# Patient Record
Sex: MALE | Race: WHITE | NOT HISPANIC OR LATINO | Employment: OTHER | URBAN - METROPOLITAN AREA
[De-identification: names, ages, dates, MRNs, and addresses within clinical notes are randomized per-mention and may not be internally consistent; named-entity substitution may affect disease eponyms.]

---

## 2017-05-01 ENCOUNTER — HISTORICAL (OUTPATIENT)
Dept: ADMINISTRATIVE | Facility: HOSPITAL | Age: 62
End: 2017-05-01

## 2017-05-01 ENCOUNTER — HISTORICAL (OUTPATIENT)
Dept: LAB | Facility: HOSPITAL | Age: 62
End: 2017-05-01

## 2020-11-17 LAB
ABS NEUT (OLG): 8.07 X10(3)/MCL (ref 2.1–9.2)
ALBUMIN SERPL-MCNC: 4.1 GM/DL (ref 3.4–4.8)
ALBUMIN/GLOB SERPL: 1.2 RATIO (ref 1.1–2)
ALP SERPL-CCNC: 76 UNIT/L (ref 40–150)
ALT SERPL-CCNC: 27 UNIT/L (ref 0–55)
ANISOCYTOSIS BLD QL SMEAR: 1
APTT PPP: 31 SECOND(S) (ref 23.2–33.7)
AST SERPL-CCNC: 17 UNIT/L (ref 5–34)
BASOPHILS # BLD AUTO: 0.2 X10(3)/MCL (ref 0–0.2)
BASOPHILS NFR BLD AUTO: 2 %
BILIRUB SERPL-MCNC: 0.5 MG/DL
BILIRUBIN DIRECT+TOT PNL SERPL-MCNC: 0.2 MG/DL (ref 0–0.5)
BILIRUBIN DIRECT+TOT PNL SERPL-MCNC: 0.3 MG/DL (ref 0–0.8)
BUN SERPL-MCNC: 8.8 MG/DL (ref 8.4–25.7)
CALCIUM SERPL-MCNC: 8.9 MG/DL (ref 8.8–10)
CHLORIDE SERPL-SCNC: 99 MMOL/L (ref 98–107)
CO2 SERPL-SCNC: 28 MMOL/L (ref 23–31)
CREAT SERPL-MCNC: 0.83 MG/DL (ref 0.73–1.18)
EOSINOPHIL # BLD AUTO: 0.3 X10(3)/MCL (ref 0–0.9)
EOSINOPHIL NFR BLD AUTO: 3 %
ERYTHROCYTE [DISTWIDTH] IN BLOOD BY AUTOMATED COUNT: 19.9 % (ref 11.5–17)
GLOBULIN SER-MCNC: 3.4 GM/DL (ref 2.4–3.5)
GLUCOSE SERPL-MCNC: 80 MG/DL (ref 82–115)
HCT VFR BLD AUTO: 39.5 % (ref 42–52)
HGB BLD-MCNC: 11.1 GM/DL (ref 14–18)
INR PPP: 1.1 (ref 0–1.3)
LYMPHOCYTES # BLD AUTO: 1.3 X10(3)/MCL (ref 0.6–4.6)
LYMPHOCYTES NFR BLD AUTO: 12 %
MCH RBC QN AUTO: 20.1 PG (ref 27–31)
MCHC RBC AUTO-ENTMCNC: 28.1 GM/DL (ref 33–36)
MCV RBC AUTO: 71.7 FL (ref 80–94)
MICROCYTES BLD QL SMEAR: 1
MONOCYTES # BLD AUTO: 1 X10(3)/MCL (ref 0.1–1.3)
MONOCYTES NFR BLD AUTO: 9 %
NEUTROPHILS # BLD AUTO: 8.07 X10(3)/MCL (ref 2.1–9.2)
NEUTROPHILS NFR BLD AUTO: 74 %
PLATELET # BLD AUTO: 610 X10(3)/MCL (ref 130–400)
PLATELET # BLD EST: ABNORMAL 10*3/UL
PMV BLD AUTO: 9.5 FL (ref 9.4–12.4)
POTASSIUM SERPL-SCNC: 4.8 MMOL/L (ref 3.5–5.1)
PROT SERPL-MCNC: 7.5 GM/DL (ref 5.8–7.6)
PROTHROMBIN TIME: 13.6 SECOND(S) (ref 11.1–13.7)
RBC # BLD AUTO: 5.51 X10(6)/MCL (ref 4.7–6.1)
RBC MORPH BLD: ABNORMAL
SODIUM SERPL-SCNC: 134 MMOL/L (ref 136–145)
WBC # SPEC AUTO: 11 X10(3)/MCL (ref 4.5–11.5)

## 2020-11-20 ENCOUNTER — HISTORICAL (OUTPATIENT)
Dept: SURGERY | Facility: HOSPITAL | Age: 65
End: 2020-11-20

## 2020-11-20 LAB — EST CREAT CLEARANCE SER (OHS): 120.73 ML/MIN

## 2021-03-10 ENCOUNTER — HISTORICAL (OUTPATIENT)
Dept: ADMINISTRATIVE | Facility: HOSPITAL | Age: 66
End: 2021-03-10

## 2021-03-10 LAB
ABS NEUT (OLG): 5.54 X10(3)/MCL (ref 2.1–9.2)
BASOPHILS # BLD AUTO: 0.1 X10(3)/MCL (ref 0–0.2)
BASOPHILS NFR BLD AUTO: 1.3 %
EOSINOPHIL # BLD AUTO: 0.3 X10(3)/MCL (ref 0–0.9)
EOSINOPHIL NFR BLD AUTO: 3.5 %
ERYTHROCYTE [DISTWIDTH] IN BLOOD BY AUTOMATED COUNT: 26.6 % (ref 11.5–17)
HCT VFR BLD AUTO: 43.1 % (ref 42–52)
HGB BLD-MCNC: 12.9 GM/DL (ref 14–18)
LYMPHOCYTES # BLD AUTO: 1.2 X10(3)/MCL (ref 0.6–4.6)
LYMPHOCYTES NFR BLD AUTO: 14.7 %
MCH RBC QN AUTO: 22.3 PG (ref 27–31)
MCHC RBC AUTO-ENTMCNC: 29.9 GM/DL (ref 33–36)
MCV RBC AUTO: 74.6 FL (ref 80–94)
MONOCYTES # BLD AUTO: 0.8 X10(3)/MCL (ref 0.1–1.3)
MONOCYTES NFR BLD AUTO: 9.9 %
NEUTROPHILS # BLD AUTO: 5.5 X10(3)/MCL (ref 2.1–9.2)
NEUTROPHILS NFR BLD AUTO: 70 %
PLATELET # BLD AUTO: 672 X10(3)/MCL (ref 130–400)
PMV BLD AUTO: 9.3 FL (ref 9.4–12.4)
RBC # BLD AUTO: 5.78 X10(6)/MCL (ref 4.7–6.1)
RET# (OHS): 0.17 X10^6/ML (ref 0.03–0.1)
RETICULOCYTE COUNT AUTOMATED (OLG): 2.9 % (ref 1.1–2.1)
WBC # SPEC AUTO: 7.9 X10(3)/MCL (ref 4.5–11.5)

## 2021-04-01 ENCOUNTER — HISTORICAL (OUTPATIENT)
Dept: ADMINISTRATIVE | Facility: HOSPITAL | Age: 66
End: 2021-04-01

## 2021-04-01 LAB
ABS NEUT (OLG): 8.87 X10(3)/MCL (ref 2.1–9.2)
BASOPHILS # BLD AUTO: 0.1 X10(3)/MCL (ref 0–0.2)
BASOPHILS NFR BLD AUTO: 0.8 %
EOSINOPHIL # BLD AUTO: 0.2 X10(3)/MCL (ref 0–0.9)
EOSINOPHIL NFR BLD AUTO: 2 %
ERYTHROCYTE [DISTWIDTH] IN BLOOD BY AUTOMATED COUNT: ABNORMAL % (ref 11.5–17)
HCT VFR BLD AUTO: 47.7 % (ref 42–52)
HGB BLD-MCNC: 14.8 GM/DL (ref 14–18)
LYMPHOCYTES # BLD AUTO: 1.2 X10(3)/MCL (ref 0.6–4.6)
LYMPHOCYTES NFR BLD AUTO: 10.4 %
MCH RBC QN AUTO: 24.2 PG (ref 27–31)
MCHC RBC AUTO-ENTMCNC: 31 GM/DL (ref 33–36)
MCV RBC AUTO: 77.9 FL (ref 80–94)
MONOCYTES # BLD AUTO: 0.8 X10(3)/MCL (ref 0.1–1.3)
MONOCYTES NFR BLD AUTO: 6.7 %
NEUTROPHILS # BLD AUTO: 8.9 X10(3)/MCL (ref 2.1–9.2)
NEUTROPHILS NFR BLD AUTO: 79.7 %
PLATELET # BLD AUTO: 708 X10(3)/MCL (ref 130–400)
PMV BLD AUTO: 8.9 FL (ref 9.4–12.4)
RBC # BLD AUTO: 6.12 X10(6)/MCL (ref 4.7–6.1)
WBC # SPEC AUTO: 11.1 X10(3)/MCL (ref 4.5–11.5)

## 2021-04-21 ENCOUNTER — HISTORICAL (OUTPATIENT)
Dept: ADMINISTRATIVE | Facility: HOSPITAL | Age: 66
End: 2021-04-21

## 2021-04-21 LAB
ABS NEUT (OLG): 5.81 X10(3)/MCL (ref 2.1–9.2)
BASOPHILS # BLD AUTO: 0.1 X10(3)/MCL (ref 0–0.2)
BASOPHILS NFR BLD AUTO: 0.7 %
EOSINOPHIL # BLD AUTO: 0.2 X10(3)/MCL (ref 0–0.9)
EOSINOPHIL NFR BLD AUTO: 2.6 %
ERYTHROCYTE [DISTWIDTH] IN BLOOD BY AUTOMATED COUNT: 26.9 % (ref 11.5–17)
HCT VFR BLD AUTO: 48.9 % (ref 42–52)
HGB BLD-MCNC: 15.7 GM/DL (ref 14–18)
LYMPHOCYTES # BLD AUTO: 1.1 X10(3)/MCL (ref 0.6–4.6)
LYMPHOCYTES NFR BLD AUTO: 13.5 %
MCH RBC QN AUTO: 25.7 PG (ref 27–31)
MCHC RBC AUTO-ENTMCNC: 32.1 GM/DL (ref 33–36)
MCV RBC AUTO: 80.2 FL (ref 80–94)
MONOCYTES # BLD AUTO: 0.8 X10(3)/MCL (ref 0.1–1.3)
MONOCYTES NFR BLD AUTO: 10.4 %
NEUTROPHILS # BLD AUTO: 5.8 X10(3)/MCL (ref 2.1–9.2)
NEUTROPHILS NFR BLD AUTO: 72.6 %
PLATELET # BLD AUTO: 591 X10(3)/MCL (ref 130–400)
PMV BLD AUTO: 8.9 FL (ref 9.4–12.4)
RBC # BLD AUTO: 6.1 X10(6)/MCL (ref 4.7–6.1)
WBC # SPEC AUTO: 8 X10(3)/MCL (ref 4.5–11.5)

## 2021-06-03 ENCOUNTER — HISTORICAL (OUTPATIENT)
Dept: ADMINISTRATIVE | Facility: HOSPITAL | Age: 66
End: 2021-06-03

## 2021-06-03 LAB
ABS NEUT (OLG): 5.85 X10(3)/MCL (ref 2.1–9.2)
BASOPHILS # BLD AUTO: 0 X10(3)/MCL (ref 0–0.2)
BASOPHILS NFR BLD AUTO: 0.4 %
EOSINOPHIL # BLD AUTO: 0.2 X10(3)/MCL (ref 0–0.9)
EOSINOPHIL NFR BLD AUTO: 2 %
ERYTHROCYTE [DISTWIDTH] IN BLOOD BY AUTOMATED COUNT: 19.8 % (ref 11.5–17)
FERRITIN SERPL-MCNC: 32.57 NG/ML (ref 21.81–274.66)
HCT VFR BLD AUTO: 49.6 % (ref 42–52)
HGB BLD-MCNC: 16.6 GM/DL (ref 14–18)
LYMPHOCYTES # BLD AUTO: 1.1 X10(3)/MCL (ref 0.6–4.6)
LYMPHOCYTES NFR BLD AUTO: 13.5 %
MCH RBC QN AUTO: 27.9 PG (ref 27–31)
MCHC RBC AUTO-ENTMCNC: 33.5 GM/DL (ref 33–36)
MCV RBC AUTO: 83.5 FL (ref 80–94)
MONOCYTES # BLD AUTO: 0.7 X10(3)/MCL (ref 0.1–1.3)
MONOCYTES NFR BLD AUTO: 9.3 %
NEUTROPHILS # BLD AUTO: 5.8 X10(3)/MCL (ref 2.1–9.2)
NEUTROPHILS NFR BLD AUTO: 74.4 %
PLATELET # BLD AUTO: 501 X10(3)/MCL (ref 130–400)
PMV BLD AUTO: 8.6 FL (ref 9.4–12.4)
RBC # BLD AUTO: 5.94 X10(6)/MCL (ref 4.7–6.1)
WBC # SPEC AUTO: 7.9 X10(3)/MCL (ref 4.5–11.5)

## 2021-08-16 ENCOUNTER — HISTORICAL (OUTPATIENT)
Dept: ADMINISTRATIVE | Facility: HOSPITAL | Age: 66
End: 2021-08-16

## 2021-08-16 LAB
ABS NEUT (OLG): 5.09 X10(3)/MCL (ref 2.1–9.2)
BASOPHILS # BLD AUTO: 0 X10(3)/MCL (ref 0–0.2)
BASOPHILS NFR BLD AUTO: 0.3 %
EOSINOPHIL # BLD AUTO: 0.1 X10(3)/MCL (ref 0–0.9)
EOSINOPHIL NFR BLD AUTO: 2.1 %
ERYTHROCYTE [DISTWIDTH] IN BLOOD BY AUTOMATED COUNT: 18.8 % (ref 11.5–17)
HCT VFR BLD AUTO: 46.3 % (ref 42–52)
HGB BLD-MCNC: 15.1 GM/DL (ref 14–18)
LYMPHOCYTES # BLD AUTO: 1 X10(3)/MCL (ref 0.6–4.6)
LYMPHOCYTES NFR BLD AUTO: 14.3 %
MCH RBC QN AUTO: 30.6 PG (ref 27–31)
MCHC RBC AUTO-ENTMCNC: 32.6 GM/DL (ref 33–36)
MCV RBC AUTO: 93.7 FL (ref 80–94)
MONOCYTES # BLD AUTO: 0.5 X10(3)/MCL (ref 0.1–1.3)
MONOCYTES NFR BLD AUTO: 8 %
NEUTROPHILS # BLD AUTO: 5.1 X10(3)/MCL (ref 2.1–9.2)
NEUTROPHILS NFR BLD AUTO: 74.9 %
PLATELET # BLD AUTO: 464 X10(3)/MCL (ref 130–400)
PMV BLD AUTO: 9 FL (ref 9.4–12.4)
RBC # BLD AUTO: 4.94 X10(6)/MCL (ref 4.7–6.1)
WBC # SPEC AUTO: 6.8 X10(3)/MCL (ref 4.5–11.5)

## 2022-02-28 ENCOUNTER — HISTORICAL (OUTPATIENT)
Dept: ADMINISTRATIVE | Facility: HOSPITAL | Age: 67
End: 2022-02-28

## 2022-02-28 LAB
ABS NEUT (OLG): 3.93 (ref 2.1–9.2)
BASOPHILS # BLD AUTO: 0.1 10*3/UL (ref 0–0.2)
BASOPHILS NFR BLD AUTO: 1 %
EOSINOPHIL # BLD AUTO: 0.2 10*3/UL (ref 0–0.9)
EOSINOPHIL NFR BLD AUTO: 4 %
ERYTHROCYTE [DISTWIDTH] IN BLOOD BY AUTOMATED COUNT: 15.7 % (ref 11.5–17)
FERRITIN SERPL-MCNC: 14.61 NG/ML (ref 21.81–274.66)
HCT VFR BLD AUTO: 36.4 % (ref 42–52)
HGB BLD-MCNC: 11.4 G/DL (ref 14–18)
LYMPHOCYTES # BLD AUTO: 1.1 10*3/UL (ref 0.6–4.6)
LYMPHOCYTES NFR BLD AUTO: 18 %
MANUAL DIFF? (OHS): NO
MCH RBC QN AUTO: 27.5 PG (ref 27–31)
MCHC RBC AUTO-ENTMCNC: 31.3 G/DL (ref 33–36)
MCV RBC AUTO: 87.7 FL (ref 80–94)
MONOCYTES # BLD AUTO: 0.6 10*3/UL (ref 0.1–1.3)
MONOCYTES NFR BLD AUTO: 11 %
NEUTROPHILS # BLD AUTO: 3.93 10*3/UL (ref 2.1–9.2)
NEUTROPHILS NFR BLD AUTO: 66 %
PLATELET # BLD AUTO: 301 10*3/UL (ref 130–400)
PMV BLD AUTO: 10.2 FL (ref 9.4–12.4)
RBC # BLD AUTO: 4.15 10*6/UL (ref 4.7–6.1)
WBC # SPEC AUTO: 5.9 10*3/UL (ref 4.5–11.5)

## 2022-04-11 ENCOUNTER — HISTORICAL (OUTPATIENT)
Dept: INFUSION THERAPY | Facility: HOSPITAL | Age: 67
End: 2022-04-11

## 2022-04-26 DIAGNOSIS — D50.9 IRON DEFICIENCY ANEMIA, UNSPECIFIED IRON DEFICIENCY ANEMIA TYPE: Primary | ICD-10-CM

## 2022-04-28 NOTE — PROGRESS NOTES
Patient:   Richard Healy            MRN: [id number]            FIN: [id number]               Age:   65 years     Sex:  Male     :  1955   Associated Diagnoses:   None   Author:   Raj Sanders MD      Visit Information   Diagnosis: ELLIS-2+ Polycythemia vera                  Iron deficiency anemia    Current Treatment: Observation  Treatment History: Feraheme 510 mg 3/2 and 3/9/21    Clinical History:  Patient was initially referred to Dr. Christensen in [place] in  for an abnormal CBC. Platelet count was 739 and hemoglobin level was 18.6. He was started on Hydrea pending further workup. PCR for BCRABL was negative. Bone marrow 17 was hypercellular 70% with trilineage hematopoiesis and no significant dysplasia. Iron stores were absent. There was no significant fibrosis. 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 Following Dr. Christensen's longterm, care was transferred to Dr. Taylor. He has never undergone therapeutic phlebotomy. Beginning in July and 2020, he began developing mild anemia with microcytic, hypochromic red cell indices. He also had progressive thrombocytosis. Iron profile 10/29/20 showed evidence of iron deficiency anemia with a serum ferritin level of 8.9. He was treated with an oral iron supplement but had progressive anemia with associated fatigue and progressive thrombocytopenia. He also had symptoms of pica for Cherry tomatoes and cucumbers. Follow-up testing 21 showed a hemoglobin of 11.1 and hematocrit 38.2. WBC was 9.2 and platelet count had increased to 739. Serum iron was 20, TIBC 488, saturation 4% and ferritin level V.6 ng/mL. B12 and folic acid levels were normal. He was treated with IV Feraheme 510 mg x 2 doses on 3/2 and 3/9/21.    He transferred his care to [facility] 3/10/21 due to the relocation of Dr. Taylor. He felt much better following the IV iron infusions. His pica symptoms had decreased  significantly in his energy level was improved. CBC showed improvement in his anemia and microcytic red cell indices. His thrombocytosis was also improved. He had been off of Hydrea since 1/21. He was also taking oral iron which was discontinued. He is a long-term smoker but had no history of thromboembolism.      Chief Complaint   Energy level improved      Interval History   He returns to clinic today for a three-week follow-up visit accompanied by his wife. His hemoglobin and hematocrit have improved with treatment of his iron deficiency. WBC and platelet counts are slightly increased from previous testing as well. He remains off of Hydrea. He is not having any significant disease related symptoms. He continues to smoke 1 pack a day. He has been vaccinated for COVID-19.       Review of Systems   Constitutional:  Weight stable, No fever, No sweats, No weakness, No fatigue.    Eye:  No icterus, No blurring, No double vision.    Ear/Nose/Mouth/Throat:  No nasal congestion, No sore throat.    Respiratory:  No shortness of breath, No cough, No sputum production, No wheezing.    Cardiovascular:  No chest pain, No palpitations, No tachycardia.    Gastrointestinal:  Constipation, No nausea, No vomiting, No diarrhea, No abdominal pain.    Genitourinary:  No dysuria, No hematuria, No change in urine stream.    Hematology/Lymphatics:  No bruising tendency, No bleeding tendency, No swollen lymph glands.    Musculoskeletal:  Joint pain, No lower extremity edema, No back pain.    Integumentary:  No rash, No pruritus, No skin lesion.    Neurologic:  Alert and oriented X4, No abnormal balance, No confusion, No numbness, No tingling.    Psychiatric:  No anxiety, No depression.       Health Status   Current medications:  (Selected)   Documented Medications  Documented  amlodipine 10 mg oral tablet: 10 mg = 1 tab(s), Oral, Daily, # 30 tab(s), 0 Refill(s)  aspirin 81 mg oral Delayed Release (EC) tablet: 81 mg = 1 tab(s), Oral,  Daily, # 30 tab(s), 0 Refill(s)  atorvastatin 40 mg oral tablet: 40 mg = 1 tab(s), Oral, Daily, # 30 tab(s), 0 Refill(s)  hydrochlorothiazide 25 mg oral tablet: 25 mg = 1 tab(s), Oral, Daily, # 30 tab(s), 0 Refill(s)  metoprolol succinate 25 mg oral tablet extended release: 25 mg = 1 tab(s), Oral, Daily, # 30 tab(s), 0 Refill(s)  multivitamin with minerals (Adult Tab): 1 tab(s), Oral, Daily, # 30 tab(s), 0 Refill(s)  trandolapril 4 mg oral tablet: 8 mg = 2 tab(s), Oral, Daily, # 30 tab(s), 0 Refill(s)      Histories     PMHx:  HTN, hyperlipidemia, COPD    PSHx:  Tonsils, appendectomy, hemorrhoids, R foot lipoma, bone marrow    SH:  + Tobacco 1.5 PPD > 40 years. + Social alcohol use. 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    FH:  Maternal grandfather had cancer of unknown type. No family history of blood disorders.      Physical Examination   Vital Signs   4/1/2021 10:35 CDT       Temperature Oral          36.1 DegC                             Temperature Oral (calculated)             96.98 DegF                             Peripheral Pulse Rate     78 bpm                             Systolic Blood Pressure   133 mmHg                             Diastolic Blood Pressure  87 mmHg                             Blood Pressure Location   Right arm                             Manual Cuff BP            No     General:  Alert and oriented, Obese white male in no acute distress.    Eye:  Pupils are equal, round and reactive to light, Extraocular movements are intact, Normal conjunctiva.    HENT:  Normocephalic, Oral mucosa is moist, No pharyngeal erythema.    Neck:  Supple, Non-tender, No lymphadenopathy.    Respiratory:  Decreased breath sounds at the bases, otherwise clear.    Cardiovascular:  Normal rate, Regular rhythm, No murmur.    Gastrointestinal:  Soft, Non-tender, Non-distended, Normal bowel sounds, Obese. No palpable masses or organomegaly.    Lymphatics:  No lymphadenopathy neck, axilla,  groin.    Musculoskeletal:  Normal range of motion, No tenderness, No lower extremity edema.    Integumentary:  Warm, Dry, No rash.    Neurologic:  Alert, Oriented, Normal sensory, Normal motor function, No focal deficits, Cranial Nerves II-XII are grossly intact.       Review / Management   Laboratory Results   Today's Lab Results : PowerNote Discrete Results   4/1/2021 10:22 CDT       WBC                       11.1 x10(3)/mcL                             RBC                       6.12 x10(6)/mcL  HI                             Hgb                       14.8 gm/dL                             Hct                       47.7 %                             Platelet                  708 x10(3)/mcL  HI                             MCV                       77.9 fL  LOW                             MCH                       24.2 pg  LOW                             MCHC                      31.0 gm/dL  LOW                             RDW                       not measured %                             MPV                       8.9 fL  LOW                             Abs Neut                  8.87 x10(3)/mcL                             NEUT%                     79.7 %  NA                             NEUT#                     8.9 x10(3)/mcL                             LYMPH%                    10.4 %  NA                             LYMPH#                    1.2 x10(3)/mcL                             MONO%                     6.7 %  NA                             MONO#                     0.8 x10(3)/mcL                             EOS%                      2.0 %  NA                             EOS#                      0.2 x10(3)/mcL                             BASO%                     0.8 %  NA                             BASO#                     0.1 x10(3)/mcL           Impression and Plan   IMPRESSION:  ELLIS-2+ Polycythemia vera  Iron deficiency anemia    PLAN:  H&H increasing following IV iron replacement.  Restart Hydrea  1000 mg alternating with 500 mg every other day.  Continue aspirin 81 mg daily.  RTC in 3 weeks for a follow-up visit and repeat CBC.      ISRA SANDERS M.D.    Other Physicians  Dr. Nick Suazo      Professional Services   I, Shasta Norman LPN, acted solely as a scribe for and in the presence of, Dr. Isra Sanders, who performed the service.

## 2022-04-28 NOTE — PROGRESS NOTES
Patient:   Richard Healy            MRN: [id number]            FIN: [id number]               Age:   65 years     Sex:  Male     :  1955   Associated Diagnoses:   None   Author:   Bhavik GREEN, Bhakti AYERS      Visit Information   Diagnosis: ELLIS-2+ Polycythemia vera                  Iron deficiency anemia - resolved      + COVID-19 vaccinated    Current Treatment: Hydrea 1000 mg daily; ASA 81 mg daily  Treatment History: Feraheme 510 mg 3/2 and 3/9/21    Clinical History:  Patient was initially referred to Dr. Christensen in [place] in  for an abnormal CBC. Platelet count was 739 and hemoglobin level was 18.6. He was started on Hydrea pending further workup. PCR for BCRABL was negative. Bone marrow 17 was hypercellular 70% with trilineage hematopoiesis and no significant dysplasia. Iron stores were absent. There was no significant fibrosis. 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 Following Dr. Christensen's shelter, care was transferred to Dr. Taylor. He has never undergone therapeutic phlebotomy. Beginning in July and 2020, he began developing mild anemia with microcytic, hypochromic red cell indices. He also had progressive thrombocytosis. Iron profile 10/29/20 showed evidence of iron deficiency anemia with a serum ferritin level of 8.9. He was treated with an oral iron supplement but had progressive anemia with associated fatigue and progressive thrombocytopenia. He also had symptoms of pica for Cherry tomatoes and cucumbers. Follow-up testing 21 showed a hemoglobin of 11.1 and hematocrit 38.2. WBC was 9.2 and platelet count had increased to 739. Serum iron was 20, TIBC 488, saturation 4% and ferritin level V.6 ng/mL. B12 and folic acid levels were normal. He was treated with IV Feraheme 510 mg x 2 doses on 3/2 and 3/9/21.    He transferred his care to [facility] 3/10/21 due to the relocation of Dr. Taylor. He felt much better  "following the IV iron infusions. His pica symptoms had decreased significantly in his energy level was improved. CBC showed improvement in his anemia and microcytic red cell indices. His thrombocytosis was also improved. He had been off of Hydrea since 1/21. He was also taking oral iron which was discontinued. He is a long-term smoker but had no history of thromboembolism. Hydrea was resumed 4/1/21 at dose of 500 mg alternating with 1000 mg daily.      Chief Complaint   "Fatigue"      Interval History   Mr. Healy is here today by himself for an 8 week followup visit.  He feels well and denies any recent illnesses.  He has been vaccinated against COVID-19.  He is taking his Hydrea daily as directed.  His current dose is 1000 mg daily.  He has no appreciable treatment related side effects.  CBC in the office today is at goal with a hematocrit of  46.3% and platelet count of 461,000.  These findings were reviewed today with the patient.  His only complaint is that of fatigue.  No fever, night sweats, abdominal pain, or early satiety.  His appetite is good and his weight is stable.        Review of Systems   Constitutional:  Fatigue, Weight stable, No fever, No sweats, No weakness.    Eye:  No icterus, No blurring, No double vision.    Ear/Nose/Mouth/Throat:  No nasal congestion, No sore throat.    Respiratory:  Chronic cough and shortness of breath (stable), No sputum production, No wheezing.    Cardiovascular:  No chest pain, No palpitations, No tachycardia.    Gastrointestinal:  Constipation, No nausea, No vomiting, No diarrhea, No abdominal pain.    Genitourinary:  No dysuria, No hematuria, No change in urine stream.    Hematology/Lymphatics:  No bruising tendency, No bleeding tendency, No swollen lymph glands.    Musculoskeletal:  Joint pain, No lower extremity edema, No back pain.    Integumentary:  No rash, No pruritus, No skin lesion.    Neurologic:  Alert and oriented X4, No abnormal balance, No confusion, No " numbness, No tingling.    Psychiatric:  No anxiety, No depression.       Health Status   Allergies:    Allergic Reactions (Selected)  No Known Medication Allergies,    Allergies (1) Active Reaction  No Known Medication Allergies None Documented     Current medications:  (Selected)   Inpatient Medications  Ordered  midazolam 1 mg/mL injectable solution: 1 mg, form: Injection, IV Push, Once, first dose 05/01/17 10:00:00 CDT, stop date 05/01/17 10:00:00 CDT, 24  Pending Complete  midazolam: 2 mg, form: Injection, IV Push, q5min, Order duration: 2 dose(s), first dose 11/20/20 5:20:00 CST, stop date 11/20/20 5:29:00 CST, (up to 5 mg for moderate anxiety)  Prescriptions  Prescribed  Hydrea 500 mg oral capsule: 1,000 mg = 2 cap(s), Oral, Daily, # 60 cap(s), 5 Refill(s), Pharmacy: [facility], 162, cm, Height/Length Dosing, 06/03/21 9:50:00 CDT, 94.1, kg, Weight Dosing, 06/03/21 9:50:00 CDT  Documented Medications  Documented  albuterol CFC free 90 mcg/inh inhalation aerosol with adapter: 2 puff(s), Oral, q4-6hr  amlodipine 10 mg oral tablet: 10 mg = 1 tab(s), Oral, Daily, # 30 tab(s), 0 Refill(s)  aspirin 81 mg oral Delayed Release (EC) tablet: 81 mg = 1 tab(s), Oral, Daily, # 30 tab(s), 0 Refill(s)  atorvastatin 40 mg oral tablet: 40 mg = 1 tab(s), Oral, Daily, # 30 tab(s), 0 Refill(s)  hydrochlorothiazide 25 mg oral tablet: 25 mg = 1 tab(s), Oral, Daily, # 30 tab(s), 0 Refill(s)  metoprolol succinate 25 mg oral tablet extended release: 25 mg = 1 tab(s), Oral, Daily, # 30 tab(s), 0 Refill(s)  multivitamin with minerals (Adult Tab): 1 tab(s), Oral, Daily, # 30 tab(s), 0 Refill(s)  trandolapril 4 mg oral tablet: 8 mg = 2 tab(s), Oral, Daily, # 30 tab(s), 0 Refill(s)      Histories     PMHx:  HTN, hyperlipidemia, COPD    PSHx:  Tonsils, appendectomy, hemorrhoids, R foot lipoma, bone marrow    SH:  + Tobacco 1.5 PPD > 40 years. + Social alcohol use. 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    FH:  Maternal grandfather had cancer of unknown type. No family history of blood disorders.      Physical Examination   Vital Signs   8/16/2021 10:35 CDT      Temperature Oral          36.2 DegC                             Temperature Oral (calculated)             97.16 DegF                             Peripheral Pulse Rate     86 bpm                             SpO2                      98 %                             Systolic Blood Pressure   141 mmHg  HI                             Diastolic Blood Pressure  86 mmHg                             Blood Pressure Location   Right arm                             Manual Cuff BP            No     Measurements from flowsheet : Measurements   8/16/2021 10:35 CDT      Weight Dosing             94.000 kg                             Weight Measured           94.0 kg                             Weight Measured and Calculated in Lbs     207.23 lb                             Height/Length Dosing      162.00 cm                             Height/Length Measured    162 cm                             BSA Measured              2.06 m2                             Body Mass Index Measured  35.82 kg/m2     General:  Alert and oriented, Obese white male in no acute distress.    Eye:  Pupils are equal, round and reactive to light, Extraocular movements are intact, Normal conjunctiva.    HENT:  Normocephalic, Oral mucosa is moist, No pharyngeal erythema.    Neck:  Supple, Non-tender, No lymphadenopathy.    Respiratory:  Decreased breath sounds at the bases, otherwise clear.    Cardiovascular:  Normal rate, Regular rhythm, No murmur.    Gastrointestinal:  Soft, Non-tender, Non-distended, Normal bowel sounds, Obese. No palpable masses or organomegaly.    Lymphatics:  No lymphadenopathy neck, axilla, groin.    Musculoskeletal:  Normal range of motion, No tenderness, No lower extremity edema.    Integumentary:  Warm, Dry, No rash.    Neurologic:  Alert, Oriented, Normal  sensory, Normal motor function, No focal deficits, Cranial Nerves II-XII are grossly intact.       Review / Management   Laboratory Results   Last 10 Days Lab Results : PowerNote Discrete Results   8/16/2021 10:24 CDT      WBC                       6.8 x10(3)/mcL                             RBC                       4.94 x10(6)/mcL                             Hgb                       15.1 gm/dL                             Hct                       46.3 %                             Platelet                  464 x10(3)/mcL  HI                             MCV                       93.7 fL                             MCH                       30.6 pg                             MCHC                      32.6 gm/dL  LOW                             RDW                       18.8 %  HI                             MPV                       9.0 fL  LOW                             Abs Neut                  5.09 x10(3)/mcL                             NEUT%                     74.9 %  NA                             NEUT#                     5.1 x10(3)/mcL                             LYMPH%                    14.3 %  NA                             LYMPH#                    1.0 x10(3)/mcL                             MONO%                     8.0 %  NA                             MONO#                     0.5 x10(3)/mcL                             EOS%                      2.1 %  NA                             EOS#                      0.1 x10(3)/mcL                             BASO%                     0.3 %  NA                             BASO#                     0.0 x10(3)/mcL           Impression and Plan   IMPRESSION:  ELLIS-2+ Polycythemia vera  Iron deficiency anemia - resolved    PLAN:  CBC is at goal.  Patient will continue Hydrea 1000 mg daily.  He will return in 12 weeks for followup with lab.  Smoking cessation encouraged.  All questions answered.    GORDON DELGADO, FNP-C    Other Physicians  Dr. Nick Suazo

## 2022-04-28 NOTE — PROGRESS NOTES
Patient:   Richard Healy            MRN: [id number]            FIN: [id number]               Age:   65 years     Sex:  Male     :  1955   Associated Diagnoses:   None   Author:   Raj Sanders MD      Visit Information   Diagnosis: ELLIS-2+ Polycythemia vera                  Iron deficiency anemia    Current Treatment: New patient visit (Transfer of care)  Treatment History: Feraheme 510 mg 3/2 and 3/9/21    Clinical History:  Patient was initially referred to Dr. Christensen in [place] in  for an abnormal CBC. Platelet count was 739 and hemoglobin level was 18.6. He was started on Hydrea pending further workup. PCR for BCRABL was negative. Bone marrow 17 was hypercellular 70% with trilineage hematopoiesis and no significant dysplasia. Iron stores were absent. There was no significant fibrosis. 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 Following Dr. Christensen's [facility], care was transferred to Dr. Taylor. He has never undergone therapeutic phlebotomy. Beginning in July and 2020, he began developing mild anemia with microcytic, hypochromic red cell indices. He also had progressive thrombocytosis. Iron profile 10/29/20 showed evidence of iron deficiency anemia with a serum ferritin level of 8.9. He was treated with an oral iron supplement but had progressive anemia with associated fatigue and progressive thrombocytopenia. He also had symptoms of pica for Cherry tomatoes and cucumbers. Follow-up testing 21 showed a hemoglobin of 11.1 and hematocrit 38.2. WBC was 9.2 and platelet count had increased to 739. Serum iron was 20, TIBC 488, saturation 4% and ferritin level V.6 ng/mL. B12 and folic acid levels were normal. He was treated with IV Feraheme 510 mg x 2 doses on 3/2 and 3/9/21.    He presents today to establish ongoing Hematology care due to the relocation of Dr. Taylor. He is accompanied by his wife. He feels much better following the IV iron infusions. His pica  symptoms have decreased in his energy level is improved. CBC today shows improvement in his anemia and microcytosis. His thrombocytosis is also slightly improved. He is not taking Hydrea currently. He is still taking oral iron once a day. He denies any chest pain or shortness of breath. He is a long-term smoker. He has no history of thromboembolism. He denies abnormal bruising or bleeding. No headaches, vision changes, pruritus or erythromelalgia.      Chief Complaint   Weakness and fatigue      Review of Systems   Constitutional:  Weakness, Fatigue, No significant weight loss, No fever, No sweats.    Eye:  No icterus, No blurring, No double vision.    Ear/Nose/Mouth/Throat:  No nasal congestion, No sore throat.    Respiratory:  No shortness of breath, No cough, No sputum production, No wheezing.    Cardiovascular:  No chest pain, No palpitations, No tachycardia.    Gastrointestinal:  No nausea, No vomiting, No diarrhea, No constipation, No abdominal pain.    Genitourinary:  No dysuria, No hematuria, No change in urine stream.    Hematology/Lymphatics:  No bruising tendency, No bleeding tendency, No swollen lymph glands.    Musculoskeletal:  Joint pain, No lower extremity edema, No back pain.    Integumentary:  No rash, No pruritus, No skin lesion.    Neurologic:  Alert and oriented X4, No abnormal balance, No confusion, No numbness, No tingling.    Psychiatric:  No anxiety, No depression.       Health Status   Current medications:  (Selected)   Documented Medications  Documented  Pepcid 20 mg oral tablet: 20 mg = 1 tab(s), Oral, Daily, # 30 tab(s), 0 Refill(s)  amlodipine 10 mg oral tablet: 10 mg = 1 tab(s), Oral, Daily, # 30 tab(s), 0 Refill(s)  aspirin 81 mg oral Delayed Release (EC) tablet: 81 mg = 1 tab(s), Oral, Daily, # 30 tab(s), 0 Refill(s)  atorvastatin 40 mg oral tablet: 40 mg = 1 tab(s), Oral, Daily, # 30 tab(s), 0 Refill(s)  hydrochlorothiazide 25 mg oral tablet: 25 mg = 1 tab(s), Oral, Daily, # 30  tab(s), 0 Refill(s)  metoprolol succinate 25 mg oral tablet extended release: 25 mg = 1 tab(s), Oral, Daily, # 30 tab(s), 0 Refill(s)  multivitamin with minerals (Adult Tab): 1 tab(s), Oral, Daily, # 30 tab(s), 0 Refill(s)  trandolapril 4 mg oral tablet: 8 mg = 2 tab(s), Oral, Daily, # 30 tab(s), 0 Refill(s)      Histories     PMHx:  HTN, hyperlipidemia, COPD    PSHx:  Tonsils, appendectomy, hemorrhoids, R foot lipoma, bone marrow    SH:  + Tobacco 1.5 PPD > 40 years. + Social alcohol use. 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    FH:  Maternal grandfather had cancer of unknown type. No family history of blood disorders.      Physical Examination   Vital Signs   3/10/2021 12:54 CST      Temperature Oral          36.8 DegC                             Temperature Oral (calculated)             98.24 DegF                             Peripheral Pulse Rate     85 bpm                             Systolic Blood Pressure   134 mmHg                             Diastolic Blood Pressure  82 mmHg                             Blood Pressure Location   Right arm                             Manual Cuff BP            No     General:  Alert and oriented, Obese white male in no acute distress.    Eye:  Pupils are equal, round and reactive to light, Extraocular movements are intact, Normal conjunctiva, Sclera nonicteric.    HENT:  Normocephalic, Oral mucosa is moist, No pharyngeal erythema.    Neck:  Supple, Non-tender, No lymphadenopathy.    Respiratory:  Lungs are clear to auscultation, Respirations are non-labored, Breath sounds are equal.    Cardiovascular:  Normal rate, Regular rhythm, No murmur.    Gastrointestinal:  Soft, Non-tender, Non-distended, Normal bowel sounds, Obese. No appreciable masses or splenomegaly.    Lymphatics:  No lymphadenopathy neck, axilla, groin.    Musculoskeletal:  Normal range of motion, Normal strength, No lower extremity edema.    Integumentary:  Warm, Dry, No rash.     Neurologic:  Alert, Oriented, Normal sensory, Normal motor function, No focal deficits, Cranial Nerves II-XII are grossly intact.       Review / Management   Laboratory Results   Today's Lab Results : [employer] Discrete Results   3/10/2021 13:22 CST      WBC                       7.9 x10(3)/mcL                             RBC                       5.78 x10(6)/mcL                             Hgb                       12.9 gm/dL  LOW                             Hct                       43.1 %                             Platelet                  672 x10(3)/mcL  HI                             MCV                       74.6 fL  LOW                             MCH                       22.3 pg  LOW                             MCHC                      29.9 gm/dL  LOW                             RDW                       26.6 %  HI                             MPV                       9.3 fL  LOW                             Abs Neut                  5.54 x10(3)/mcL                             NEUT%                     70.0 %  NA                             NEUT#                     5.5 x10(3)/mcL                             LYMPH%                    14.7 %  NA                             LYMPH#                    1.2 x10(3)/mcL                             MONO%                     9.9 %  NA                             MONO#                     0.8 x10(3)/mcL                             EOS%                      3.5 %  NA                             EOS#                      0.3 x10(3)/mcL                             BASO%                     1.3 %  NA                             BASO#                     0.1 x10(3)/mcL           Impression and Plan   IMPRESSION:  ELLIS-2+ Polycythemia vera  Iron deficiency anemia  Thrombocytosis    PLAN:  Anemia improved s/p 2 doses of IV Feraheme.  His thrombocytosis was also likely due in part to the severe iron deficiency.  Need to continue to monitor his counts following IV iron  replacement to ensure he does not have significant posttreatment erythrocytosis.  He was instructed to discontinue the oral iron supplement and continue to hold Hydrea for now.  RTC in 3 weeks for a follow-up visit and clinical exam with repeat laboratory.      ISRA SANDERS M.D.    Other Physicians  Dr. Nick Suazo      Professional Services   I, Shasta Norman LPN, acted solely as a scribe for and in the presence of, Dr. Isra Sanders, who performed the service.

## 2022-04-28 NOTE — PROGRESS NOTES
Patient:   Richard Healy            MRN: [id number]            FIN: [id number]               Age:   65 years     Sex:  Male     :  1955   Associated Diagnoses:   None   Author:   Bhavik GREEN, Bhakti AYERS      Visit Information   Diagnosis: ELLIS-2+ Polycythemia vera                  Iron deficiency anemia      + COVID-19 vaccinated    Current Treatment: Hydrea 500 mg alternating with 1000 mg daily resumed 21  Treatment History: Feraheme 510 mg 3/2 and 3/9/21    Clinical History:  Patient was initially referred to Dr. Christensen in [place] in  for an abnormal CBC. Platelet count was 739 and hemoglobin level was 18.6. He was started on Hydrea pending further workup. PCR for BCRABL was negative. Bone marrow 17 was hypercellular 70% with trilineage hematopoiesis and no significant dysplasia. Iron stores were absent. There was no significant fibrosis. 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 Following Dr. Christensen's intermediate, care was transferred to Dr. Taylor. He has never undergone therapeutic phlebotomy. Beginning in July and 2020, he began developing mild anemia with microcytic, hypochromic red cell indices. He also had progressive thrombocytosis. Iron profile 10/29/20 showed evidence of iron deficiency anemia with a serum ferritin level of 8.9. He was treated with an oral iron supplement but had progressive anemia with associated fatigue and progressive thrombocytopenia. He also had symptoms of pica for Cherry tomatoes and cucumbers. Follow-up testing 21 showed a hemoglobin of 11.1 and hematocrit 38.2. WBC was 9.2 and platelet count had increased to 739. Serum iron was 20, TIBC 488, saturation 4% and ferritin level V.6 ng/mL. B12 and folic acid levels were normal. He was treated with IV Feraheme 510 mg x 2 doses on 3/2 and 3/9/21.    He transferred his care to [facility] 3/10/21 due to the relocation of Dr. Taylor. He felt  "much better following the IV iron infusions. His pica symptoms had decreased significantly in his energy level was improved. CBC showed improvement in his anemia and microcytic red cell indices. His thrombocytosis was also improved. He had been off of Hydrea since 1/21. He was also taking oral iron which was discontinued. He is a long-term smoker but had no history of thromboembolism.    Hydrea was resumed 4/1/21 at dose of 500 mg alternating with 1000 mg daily.      Chief Complaint   "I am not having any problems with the medication"      Interval History   Mr. Healy returns today by himself for a three week followup visit.  He resumed treatment with Hydrea  4/1/21 at a dose of 500 mg alternating with 1000 mg daily.  He does not appreciate any treatment related side effects.  He does report ongoing fatigue, although improved since receiving the Feraheme.  He also has intermittent shortness of breath due to his underlying COPD.  He continues to smoke and does not intend to quit at this time.  He takes prophylactic ASA daily.  CBC in the office today shows improvement in his platelet count to 591,000.  He denies any associated sweats, pruritus, or erythromelalgia.  These results were reviewed and discussed today with the patient.      Review of Systems   Constitutional:  Fatigue, Weight stable, No fever, No sweats, No weakness.    Eye:  No icterus, No blurring, No double vision.    Ear/Nose/Mouth/Throat:  No nasal congestion, No sore throat.    Respiratory:  Chronic cough and shortness of breath s/t underlying COPD, No sputum production, No wheezing.    Cardiovascular:  No chest pain, No palpitations, No tachycardia.    Gastrointestinal:  Constipation, No nausea, No vomiting, No diarrhea, No abdominal pain.    Genitourinary:  No dysuria, No hematuria, No change in urine stream.    Hematology/Lymphatics:  No bruising tendency, No bleeding tendency, No swollen lymph glands.    Musculoskeletal:  Joint pain, No lower " extremity edema, No back pain.    Integumentary:  No rash, No pruritus, No skin lesion.    Neurologic:  Alert and oriented X4, No abnormal balance, No confusion, No numbness, No tingling.    Psychiatric:  No anxiety, No depression.       Health Status   Allergies:    Allergic Reactions (Selected)  No Known Medication Allergies,    Allergies (1) Active Reaction  No Known Medication Allergies None Documented     Current medications:  (Selected)   Outpatient Medications  Ordered  midazolam 1 mg/mL injectable solution: 1 mg, form: Injection, IV Push, Once, first dose 05/01/17 10:00:00 CDT, stop date 05/01/17 10:00:00 CDT, 24  Pending Complete  midazolam: 2 mg, form: Injection, IV Push, q5min, Order duration: 2 dose(s), first dose 11/20/20 5:20:00 CST, stop date 11/20/20 5:29:00 CST, (up to 5 mg for moderate anxiety)  Prescriptions  Prescribed  Hydrea 500 mg oral capsule: See Instructions, 2 capsules alternating with 1 capsule every other day, # 60 cap(s), 5 Refill(s), Pharmacy: [facility], 162, cm, Height/Length Dosing, 04/01/21 10:37:00 CDT, 97.1, kg, Weight Dosing, 04/01/21 10:37:00 CDT  Documented Medications  Documented  amlodipine 10 mg oral tablet: 10 mg = 1 tab(s), Oral, Daily, # 30 tab(s), 0 Refill(s)  aspirin 81 mg oral Delayed Release (EC) tablet: 81 mg = 1 tab(s), Oral, Daily, # 30 tab(s), 0 Refill(s)  atorvastatin 40 mg oral tablet: 40 mg = 1 tab(s), Oral, Daily, # 30 tab(s), 0 Refill(s)  hydrochlorothiazide 25 mg oral tablet: 25 mg = 1 tab(s), Oral, Daily, # 30 tab(s), 0 Refill(s)  metoprolol succinate 25 mg oral tablet extended release: 25 mg = 1 tab(s), Oral, Daily, # 30 tab(s), 0 Refill(s)  multivitamin with minerals (Adult Tab): 1 tab(s), Oral, Daily, # 30 tab(s), 0 Refill(s)  trandolapril 4 mg oral tablet: 8 mg = 2 tab(s), Oral, Daily, # 30 tab(s), 0 Refill(s)      Histories     PMHx:  HTN, hyperlipidemia, COPD    PSHx:  Tonsils, appendectomy, hemorrhoids, R foot lipoma, bone marrow    SH:   + Tobacco 1.5 PPD > 40 years. + Social alcohol use. 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    FH:  Maternal grandfather had cancer of unknown type. No family history of blood disorders.      Physical Examination   Vital Signs   4/21/2021 9:51 CDT       Temperature Oral          36.1 DegC                             Temperature Oral (calculated)             96.98 DegF                             Peripheral Pulse Rate     71 bpm                             Systolic Blood Pressure   116 mmHg                             Diastolic Blood Pressure  78 mmHg                             Blood Pressure Location   Right arm                             Manual Cuff BP            No     Measurements from flowsheet : Measurements   4/21/2021 9:51 CDT       Weight Dosing             95.700 kg                             Weight Measured           95.7 kg                             Weight Measured and Calculated in Lbs     210.98 lb                             Height/Length Dosing      162.00 cm                             Height/Length Measured    162 cm                             BSA Measured              2.08 m2                             Body Mass Index Measured  36.47 kg/m2     General:  Alert and oriented, Obese white male in no acute distress.    Eye:  Pupils are equal, round and reactive to light, Extraocular movements are intact, Normal conjunctiva.    HENT:  Normocephalic, Oral mucosa is moist, No pharyngeal erythema.    Neck:  Supple, Non-tender, No lymphadenopathy.    Respiratory:  Decreased breath sounds at the bases, otherwise clear.    Cardiovascular:  Normal rate, Regular rhythm, No murmur.    Gastrointestinal:  Soft, Non-tender, Non-distended, Normal bowel sounds, Obese. No palpable masses or organomegaly.    Lymphatics:  No lymphadenopathy neck, axilla, groin.    Musculoskeletal:  Normal range of motion, No tenderness, No lower extremity edema.    Integumentary:  Warm, Dry, No rash.     Neurologic:  Alert, Oriented, Normal sensory, Normal motor function, No focal deficits, Cranial Nerves II-XII are grossly intact.       Review / Management   Results review:  Lab results   4/21/2021 9:37 CDT       WBC                       8.0 x10(3)/mcL                             RBC                       6.10 x10(6)/mcL                             Hgb                       15.7 gm/dL                             Hct                       48.9 %                             Platelet                  591 x10(3)/mcL  HI                             MCV                       80.2 fL                             MCH                       25.7 pg  LOW                             MCHC                      32.1 gm/dL  LOW                             RDW                       26.9 %  HI                             MPV                       8.9 fL  LOW                             Abs Neut                  5.81 x10(3)/mcL                             NEUT%                     72.6 %  NA                             NEUT#                     5.8 x10(3)/mcL                             LYMPH%                    13.5 %  NA                             LYMPH#                    1.1 x10(3)/mcL                             MONO%                     10.4 %  NA                             MONO#                     0.8 x10(3)/mcL                             EOS%                      2.6 %  NA                             EOS#                      0.2 x10(3)/mcL                             BASO%                     0.7 %  NA                             BASO#                     0.1 x10(3)/mcL  .       Impression and Plan   IMPRESSION:  ELLIS-2+ Polycythemia vera  Iron deficiency anemia    PLAN:  Patient has no evidence of recurrent anemia following IV iron replacement.  Platelet count at goal now that Hydrea has been resumed.  Continue Hydrea at current dose and schedule.  Continue Aspirin.  RTC 6 weeks for followup with lab.  Smoking  cessation encouraged.  All questions answered.    GORDON DELGADO, FNP-C    Other Physicians  Dr. Nick Suazo

## 2022-04-28 NOTE — PROGRESS NOTES
Patient:   Richard Healy            MRN: [id number]            FIN: [id number]               Age:   65 years     Sex:  Male     :  1955   Associated Diagnoses:   None   Author:   Raj Sanders MD      Visit Information   Diagnosis: ELLIS-2+ Polycythemia vera                  Iron deficiency anemia - resolved      + COVID-19 vaccinated    Current Treatment: Hydrea 500 mg alternating with 1000 mg daily resumed 21; ASA 81 mg daily  Treatment History: Feraheme 510 mg 3/2 and 3/9/21    Clinical History:  Patient was initially referred to Dr. Christensen in [place] in  for an abnormal CBC. Platelet count was 739 and hemoglobin level was 18.6. He was started on Hydrea pending further workup. PCR for BCRABL was negative. Bone marrow 17 was hypercellular 70% with trilineage hematopoiesis and no significant dysplasia. Iron stores were absent. There was no significant fibrosis. 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 Following Dr. Christensen's longterm, care was transferred to Dr. Taylor. He has never undergone therapeutic phlebotomy. Beginning in July and 2020, he began developing mild anemia with microcytic, hypochromic red cell indices. He also had progressive thrombocytosis. Iron profile 10/29/20 showed evidence of iron deficiency anemia with a serum ferritin level of 8.9. He was treated with an oral iron supplement but had progressive anemia with associated fatigue and progressive thrombocytopenia. He also had symptoms of pica for Cherry tomatoes and cucumbers. Follow-up testing 21 showed a hemoglobin of 11.1 and hematocrit 38.2. WBC was 9.2 and platelet count had increased to 739. Serum iron was 20, TIBC 488, saturation 4% and ferritin level V.6 ng/mL. B12 and folic acid levels were normal. He was treated with IV Feraheme 510 mg x 2 doses on 3/2 and 3/9/21.    He transferred his care to [facility] 3/10/21 due to the relocation  of Dr. Taylor. He felt much better following the IV iron infusions. His pica symptoms had decreased significantly in his energy level was improved. CBC showed improvement in his anemia and microcytic red cell indices. His thrombocytosis was also improved. He had been off of Hydrea since 1/21. He was also taking oral iron which was discontinued. He is a long-term smoker but had no history of thromboembolism. Hydrea was resumed 4/1/21 at dose of 500 mg alternating with 1000 mg daily.      Chief Complaint   I feel pretty good      Interval History   He returns to clinic today by himself for a 6 week follow-up visit. His iron deficiency anemia resolved following IV iron replacement. His other counts have improved once Hydrea was resumed. He has not required additional phlebotomy. He reports significant improvement in his energy level and performance status. No fever, chills, night sweats or weight loss. He reports no recent illnesses or infections. He is scheduled for an upcoming treadmill stress test with his Cardiologist. He continues to smoke. He has been vaccinated for COVID-19.      Review of Systems   Constitutional:  Weight stable, No fever, No sweats, No weakness, No fatigue.    Eye:  No icterus, No blurring, No double vision.    Ear/Nose/Mouth/Throat:  No nasal congestion, No sore throat.    Respiratory:  Chronic cough and shortness of breath (stable), No sputum production, No wheezing.    Cardiovascular:  No chest pain, No palpitations, No tachycardia.    Gastrointestinal:  Constipation, No nausea, No vomiting, No diarrhea, No abdominal pain.    Genitourinary:  No dysuria, No hematuria, No change in urine stream.    Hematology/Lymphatics:  No bruising tendency, No bleeding tendency, No swollen lymph glands.    Musculoskeletal:  Joint pain, No lower extremity edema, No back pain.    Integumentary:  No rash, No pruritus, No skin lesion.    Neurologic:  Alert and oriented X4, No abnormal balance, No confusion, No  numbness, No tingling.    Psychiatric:  No anxiety, No depression.       Health Status   Current medications:  (Selected)   Prescriptions  Prescribed  Hydrea 500 mg oral capsule: 1,000 mg = 2 cap(s), Oral, Daily, # 60 cap(s), 5 Refill(s), Pharmacy: [facility], 162, cm, Height/Length Dosing, 06/03/21 9:50:00 CDT, 94.1, kg, Weight Dosing, 06/03/21 9:50:00 CDT  Documented Medications  Documented  albuterol CFC free 90 mcg/inh inhalation aerosol with adapter: 2 puff(s), Oral, q4-6hr  amlodipine 10 mg oral tablet: 10 mg = 1 tab(s), Oral, Daily, # 30 tab(s), 0 Refill(s)  aspirin 81 mg oral Delayed Release (EC) tablet: 81 mg = 1 tab(s), Oral, Daily, # 30 tab(s), 0 Refill(s)  atorvastatin 40 mg oral tablet: 40 mg = 1 tab(s), Oral, Daily, # 30 tab(s), 0 Refill(s)  hydrochlorothiazide 25 mg oral tablet: 25 mg = 1 tab(s), Oral, Daily, # 30 tab(s), 0 Refill(s)  metoprolol succinate 25 mg oral tablet extended release: 25 mg = 1 tab(s), Oral, Daily, # 30 tab(s), 0 Refill(s)  multivitamin with minerals (Adult Tab): 1 tab(s), Oral, Daily, # 30 tab(s), 0 Refill(s)  trandolapril 4 mg oral tablet: 8 mg = 2 tab(s), Oral, Daily, # 30 tab(s), 0 Refill(s)      Histories     PMHx:  HTN, hyperlipidemia, COPD    PSHx:  Tonsils, appendectomy, hemorrhoids, R foot lipoma, bone marrow    SH:  + Tobacco 1.5 PPD > 40 years. + Social alcohol use. 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    FH:  Maternal grandfather had cancer of unknown type. No family history of blood disorders.      Physical Examination   Vital Signs   6/3/2021 9:48 CDT        Temperature Oral          36.5 DegC                             Temperature Oral (calculated)             97.70 DegF                             Peripheral Pulse Rate     89 bpm                             Systolic Blood Pressure   136 mmHg                             Diastolic Blood Pressure  91 mmHg  HI                             Blood Pressure Location   Right arm                              Manual Cuff BP            No     General:  Alert and oriented, Obese white male in no acute distress.    Eye:  Pupils are equal, round and reactive to light, Extraocular movements are intact, Normal conjunctiva.    HENT:  Normocephalic, Oral mucosa is moist, No pharyngeal erythema.    Neck:  Supple, Non-tender, No lymphadenopathy.    Respiratory:  Decreased breath sounds at the bases, otherwise clear.    Cardiovascular:  Normal rate, Regular rhythm, No murmur.    Gastrointestinal:  Soft, Non-tender, Non-distended, Normal bowel sounds, Obese. No palpable masses or organomegaly.    Lymphatics:  No lymphadenopathy neck, axilla, groin.    Musculoskeletal:  Normal range of motion, No tenderness, No lower extremity edema.    Integumentary:  Warm, Dry, No rash.    Neurologic:  Alert, Oriented, Normal sensory, Normal motor function, No focal deficits, Cranial Nerves II-XII are grossly intact.       Review / Management   Laboratory Results   Today's Lab Results : PowerNote Discrete Results   6/3/2021 9:38 CDT        WBC                       7.9 x10(3)/mcL                             RBC                       5.94 x10(6)/mcL                             Hgb                       16.6 gm/dL                             Hct                       49.6 %                             Platelet                  501 x10(3)/mcL  HI                             MCV                       83.5 fL                             MCH                       27.9 pg                             MCHC                      33.5 gm/dL                             RDW                       19.8 %  HI                             MPV                       8.6 fL  LOW                             Abs Neut                  5.85 x10(3)/mcL                             NEUT%                     74.4 %  NA                             NEUT#                     5.8 x10(3)/mcL                             LYMPH%                    13.5 %  NA                              LYMPH#                    1.1 x10(3)/mcL                             MONO%                     9.3 %  NA                             MONO#                     0.7 x10(3)/mcL                             EOS%                      2.0 %  NA                             EOS#                      0.2 x10(3)/mcL                             BASO%                     0.4 %  NA                             BASO#                     0.0 x10(3)/mcL     , Last 10 Days Lab Results : PowerNote Discrete Results   5/12/2021 8:38 CDT       WBC                       9.0 x10(3)/mcL                             RBC                       6.22 x10(6)/mcL  HI                             Hgb                       16.6 gm/dL                             Hct                       51.5 %  HI                             Platelet                  595 x10(3)/mcL  HI                             MCV                       82.8 fL                             MCH                       26.7 pg  LOW                             MCHC                      32.2 gm/dL  LOW                             RDW                       24.7 %  HI                             MPV                       9.3 fL                             Abs Neut                  6.8 x10(3)/mcL                             Neutro Auto               75.5 %  HI                             Lymph Auto                11.7 %  LOW                             Mono Auto                 8.6 %                             Eos Auto                  2.5 %                             Abs Eos                   0.2 x10(3)/mcL                             Basophil Auto             1.0 %                             Abs Neutro                6.8 x10(3)/mcL                             Abs Lymph                 1.1 x10(3)/mcL                             Abs Mono                  0.8 x10(3)/mcL                             Abs Baso                  0.1 x10(3)/mcL                              IG%                       0.7 %                             IG#                       0  NA                             Sodium Lvl                137 mmol/L                             Potassium Lvl             4.2 mmol/L                             Chloride                  103 mmol/L                             CO2                       26 mmol/L                             Calcium Lvl               9.9 mg/dL                             AGAP                      11.8 mEq/L  LOW                             Glucose Lvl               101 mg/dL                             BUN                       13 mg/dL                             Creatinine                0.83 mg/dL                             eGFR-AA                   120.0 mL/min/1.73 m2  NA                             eGFR-SEBASTIAN                  99.1 mL/min/1.73 m2  NA                             Bili Total                0.4 mg/dL                             Bili Direct               0.1 mg/dL                             Bili Indirect             0.3 mg/dL                             AST                       23 U/L                             ALT                       51 U/L  HI                             Alk Phos                  73 U/L                             Total Protein             7.2 gm/dL                             Albumin Lvl               4.9 gm/dL  HI                             Globulin                  2.3 gm/dL                             A/G Ratio                 2.1 g/dL(%)  NA                             PSA                       0.63 ng/mL           Impression and Plan   IMPRESSION:  ELLIS-2+ Polycythemia vera  Iron deficiency anemia - resolved    PLAN:  Counts improved with resumption of Hydrea.  Increase Hydrea to 1000 mg daily.  Continue aspirin 81 mg daily.  RTC in 8 weeks for a follow-up visit with repeat laboratory testing.  Patient was counseled again regarding smoking cessation.      ISRA KOHLER MD    Other Physicians    Nick Suazo

## 2022-05-01 NOTE — HISTORICAL [facility] CERNER
This is a historical note converted from Nu. Formatting and pictures may have been removed.  Please reference Nu for original formatting and attached multimedia. Preoperative Diagnosis  Painful benign soft tissue mass, right foot  Postoperative Diagnosis  Same  Operation  Excision of benign soft tissue tumor below fascia right foot  Surgeon(s)  Brock Bateman D.P.M.  Anesthesia  IV sedation with right ankle block  Estimated Blood Loss  Minimal  Findings  Fatty?soft tissue mass, partially?capsulated.??No communication with?bone?or joint.  Specimen(s)  Soft tissue mass, right foot  Complications  None  Technique  Patient was brought into the operating room placed on the operating table in the supine position.??Following induction of anesthesia?a right ankle block was performed?using 0.5% Marcaine plain and 1% lidocaine plain.??The right lower extremity was prepped and draped in the usual sterile fashion.??Timeout was performed.??The right lower extremity was exsanguinated with Esmarch bandage and ankle tourniquet inflated to 250 mmHg for the?first portion of the procedure.  ?  Attention was focused to the right?forefoot where large soft tissue mass?was noted?between the first and second toes extending?plantarly?between the metatarsal heads.??Semielliptical?incision was performed?to remove the redundant skin?using?4: 1?margins.??Careful dissection was carried down?through the subcutaneous?layer carefully retracting and protecting?neurovascular structures.??Bovie was used for electrocautery.??A?partially?encapsulated?soft tissue mass,?consisting of?subcutaneous tissue?was immediately identified?within the soft tissue?subcutaneous layer.??The mass?was multilocular?and had some extension?medially laterally and proximally?along the?plantar?compartment of the foot.??There was some?extension through the deep fascia and the deep fascia was?incised for?complete removal.??In total the mass measured approximately?5 x 5  cm.??It appeared to arise from a stalk?between the first and second?metatarsal phalangeal joints?and the furthest extent of the mass was?dissected free?and cauterized.??The tourniquet was?released?and thorough hemostasis was achieved with electrocautery.??It appeared that?the mass was totally excised?with?good margin?of?viable?nonpathologic tissue.??No signs of extension?of the mass into the bone or joint.??The mass will be sent?for pathologic examination?and for diagnosis.??The site was thoroughly irrigated with saline?and?the incision that was closed with?3-0 nylon?without tension of the skin edges.??A compression dressing was applied.??Patient tolerated the procedure and anesthesia well vital signs stable throughout.??He will be discharged home with the following instructions to?remain?partially weightbearing to the heel?and lateral foot only with surgical shoe.??Elevate?and rest?for the next?5 days.??He will keep dressing dry clean intact.??He can resume?aspirin?tomorrow.??He will follow up with me next week as scheduled.

## 2022-05-27 DIAGNOSIS — D50.9 IRON DEFICIENCY ANEMIA, UNSPECIFIED IRON DEFICIENCY ANEMIA TYPE: Primary | ICD-10-CM

## 2022-05-27 NOTE — PROGRESS NOTES
Subjective:       Patient ID: Richard Healy is a 66 y.o. male.    Chief Complaint:  I feel better, trying to stop smoking    HPI  Diagnosis: ELLIS-2+ Polycythemia vera                     Iron deficiency anemia                     + COVID-19 vaccinated     Current Treatment: Hydrea 1000 mg daily; ASA 81 mg daily  Treatment History: Feraheme 3/21; Injectafer 4/22    Clinical History:  Patient was initially referred to Dr. Christensen in [place] in 2017 for an abnormal CBC. Platelet count was 739 and hemoglobin level was 18.6. He was started on Hydrea pending further workup. PCR for BCR-ABL was negative. Bone marrow 5/11/17 was hypercellular 70% with trilineage hematopoiesis and no significant dysplasia. Iron stores were absent. There was no significant fibrosis. 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 Following Dr. Christensen's [facility], care was transferred to Dr. Taylor. He had never undergone therapeutic phlebotomy. Beginning in July and September 2020, he began developing mild anemia with microcytic, hypochromic red cell indices. He also had progressive thrombocytosis. Iron profile 10/29/20 showed evidence of iron deficiency anemia with a serum ferritin level of 8.9. He was treated with an oral iron supplement but had progressive anemia with associated fatigue and progressive thrombocytopenia. He also had symptoms of pica for Cherry tomatoes and cucumbers. Follow-up testing 2/12/21 showed a hemoglobin of 11.1 and hematocrit 38.2. WBC was 9.2 and platelet count had increased to 739. Serum iron was 20, TIBC 488, saturation 4% and ferritin level 5.6 ng/mL. B12 and folic acid levels were normal. He was treated with IV Feraheme 510 mg x 2 doses 3/21 with symptomatic improvement.    He transferred his [facility] to [facility] 3[facility]10[facility]21 due to the relocation of Dr. Taylor. Counts improved following IV iron therapy.  Hydrea was resumed 4/1/21 at dose of 500 mg alternating with 1000  mg daily.  He developed recurrent iron deficiency anemia with a hemoglobin of 11.4 and serum ferritin level 14.6 ng/mL.  His WBC and platelet counts were normal on Hydrea.  He had recurrent PICA symptoms for cherry tomatoes.  Was treated with a single dose of Injectafer 750 mg on 04/11/22.  His energy level improved following treatment.    Interval History  He returns to the office today by himself for an 8 week follow-up visit.  He feels better following the iron infusion and his hemoglobin level has improved.  He does not have progressive leukocytosis or thrombocytosis.  He underwent an EGD and colonoscopy.  He reports the only finding was internal hemorrhoids.  I have requested the reports for review.  He denies any headaches, visual changes or and symptoms of erythromegalia. He is actively participating in a smoking cessation program.      Review of Systems   Constitutional: Negative for appetite change, fatigue and fever.   HENT: Negative for mouth sores, sore throat and trouble swallowing.    Eyes: Negative.    Respiratory: Positive for cough. Negative for chest tightness and shortness of breath.    Cardiovascular: Negative for chest pain, palpitations and leg swelling.   Gastrointestinal: Positive for blood in stool (Intermittent due to hemorrhoids). Negative for abdominal distention, abdominal pain, change in bowel habit, constipation, diarrhea, nausea, vomiting and change in bowel habit.   Genitourinary: Negative for dysuria, frequency and urgency.   Musculoskeletal: Negative for arthralgias and back pain.   Integumentary:  Negative for rash and mole/lesion.        No pruritus   Hematological: Negative for adenopathy. Does not bruise/bleed easily.       PMHx:  HTN, hyperlipidemia, COPD  PSHx:  Tonsils, appendectomy, hemorrhoids, R foot lipoma, bone marrow, colonoscopy  SH:  + Tobacco 1.5 PPD > 40 years. + Social alcohol use. 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  FH:  Maternal  "grandfather had cancer of unknown type. No family history of blood disorders.       Objective:        /82 (BP Location: Right arm, Patient Position: Sitting, BP Method: Large (Manual))   Pulse 86   Temp 96 °F (35.6 °C)   Resp 18   Ht 5' 4" (1.626 m)   Wt 96 kg (211 lb 10.3 oz)   SpO2 97%   BMI 36.33 kg/m²    Physical Exam  Constitutional:       General: He is not in acute distress.     Appearance: He is obese.   HENT:      Head: Normocephalic and atraumatic.      Nose: Nose normal.      Mouth/Throat:      Mouth: Mucous membranes are moist.      Pharynx: Oropharynx is clear. No posterior oropharyngeal erythema.   Eyes:      Extraocular Movements: Extraocular movements intact.      Conjunctiva/sclera: Conjunctivae normal.      Pupils: Pupils are equal, round, and reactive to light.   Cardiovascular:      Rate and Rhythm: Normal rate and regular rhythm.      Heart sounds: No murmur heard.  Pulmonary:      Comments: Decreased breath sounds at the bases, otherwise clear  Abdominal:      General: Bowel sounds are normal. There is no distension.      Palpations: Abdomen is soft. There is no mass.      Tenderness: There is no abdominal tenderness.      Comments: No splenomegaly   Musculoskeletal:         General: No swelling or tenderness. Normal range of motion.      Cervical back: Normal range of motion and neck supple. No tenderness.   Lymphadenopathy:      Cervical: No cervical adenopathy.   Skin:     General: Skin is warm and dry.      Findings: No lesion or rash.   Neurological:      General: No focal deficit present.      Mental Status: He is alert and oriented to person, place, and time.      Cranial Nerves: No cranial nerve deficit.      Gait: Gait normal.          LABORATORY  Recent Results (from the past 168 hour(s))   CBC with Differential    Collection Time: 06/01/22  9:37 AM   Result Value Ref Range    WBC 5.3 4.5 - 11.5 x10(3)/mcL    RBC 4.28 (L) 4.70 - 6.10 x10(6)/mcL    Hgb 12.6 (L) 14.0 - 18.0 " gm/dL    Hct 40.3 (L) 42.0 - 52.0 %    MCV 94.2 (H) 80.0 - 94.0 fL    MCH 29.4 27.0 - 31.0 pg    MCHC 31.3 (L) 33.0 - 36.0 mg/dL    RDW 21.0 (H) 11.5 - 17.0 %    Platelet 480 (H) 130 - 400 x10(3)/mcL    MPV 8.8 (L) 9.4 - 12.4 fL    Neut % 69.3 %    Lymph % 16.9 %    Mono % 10.5 %    Eos % 2.1 %    Basophil % 0.6 %    Lymph # 0.90 0.6 - 4.6 x10(3)/mcL    Neut # 3.7 2.1 - 9.2 x10(3)/mcL    Mono # 0.56 0.1 - 1.3 x10(3)/mcL    Eos # 0.11 0 - 0.9 x10(3)/mcL    Baso # 0.03 0 - 0.2 x10(3)/mcL    IG# 0.03 (H) 0 - 0.0155 x10(3)/mcL    IG% 0.6 (H) 0 - 0.43 %            Assessment:   ELLIS-2+ Polcythemia vera  Recurrent iron deficiency anemia      Plan:   Hemoglobin improved following IV iron therapy.  GI workup revealed only internal hemorrhoids per patient's report.  Continue Hydrea 1000 mg daily.  RTC in 4 months for a follow-up visit with a CBC, iron profile and serum ferritin level.      Raj Sanders MD    Other Physicians  Dr. Nick Suazo

## 2022-05-31 RX ORDER — HYDROCHLOROTHIAZIDE 25 MG/1
12.5 TABLET ORAL DAILY
COMMUNITY
Start: 2022-04-12

## 2022-05-31 RX ORDER — METOPROLOL TARTRATE 25 MG/1
25 TABLET, FILM COATED ORAL DAILY
COMMUNITY
Start: 2022-04-12

## 2022-05-31 RX ORDER — HYDROXYUREA 500 MG/1
1000 CAPSULE ORAL DAILY
COMMUNITY
Start: 2022-05-20 | End: 2022-05-31 | Stop reason: SDUPTHER

## 2022-05-31 RX ORDER — TRANDOLAPRIL 4 MG/1
8 TABLET ORAL DAILY
COMMUNITY
Start: 2022-04-12

## 2022-05-31 RX ORDER — AMLODIPINE BESYLATE 10 MG/1
10 TABLET ORAL DAILY
COMMUNITY
Start: 2022-04-12

## 2022-05-31 RX ORDER — ATORVASTATIN CALCIUM 40 MG/1
40 TABLET, FILM COATED ORAL DAILY
COMMUNITY
Start: 2022-04-12

## 2022-06-01 ENCOUNTER — OFFICE VISIT (OUTPATIENT)
Dept: HEMATOLOGY/ONCOLOGY | Facility: CLINIC | Age: 67
End: 2022-06-01
Payer: MEDICARE

## 2022-06-01 ENCOUNTER — LAB VISIT (OUTPATIENT)
Dept: LAB | Facility: HOSPITAL | Age: 67
End: 2022-06-01
Payer: MEDICARE

## 2022-06-01 VITALS
RESPIRATION RATE: 18 BRPM | OXYGEN SATURATION: 97 % | HEART RATE: 86 BPM | DIASTOLIC BLOOD PRESSURE: 82 MMHG | WEIGHT: 211.63 LBS | HEIGHT: 64 IN | BODY MASS INDEX: 36.13 KG/M2 | TEMPERATURE: 96 F | SYSTOLIC BLOOD PRESSURE: 134 MMHG

## 2022-06-01 DIAGNOSIS — D50.9 IRON DEFICIENCY ANEMIA, UNSPECIFIED IRON DEFICIENCY ANEMIA TYPE: ICD-10-CM

## 2022-06-01 DIAGNOSIS — D45 POLYCYTHEMIA VERA: Primary | ICD-10-CM

## 2022-06-01 LAB
ALBUMIN SERPL-MCNC: 4.3 GM/DL (ref 3.4–4.8)
ALBUMIN/GLOB SERPL: 1.4 RATIO (ref 1.1–2)
ALP SERPL-CCNC: 65 UNIT/L (ref 40–150)
ALT SERPL-CCNC: 41 UNIT/L (ref 0–55)
AST SERPL-CCNC: 21 UNIT/L (ref 5–34)
BASOPHILS # BLD AUTO: 0.03 X10(3)/MCL (ref 0–0.2)
BASOPHILS NFR BLD AUTO: 0.6 %
BILIRUBIN DIRECT+TOT PNL SERPL-MCNC: 0.5 MG/DL
BUN SERPL-MCNC: 14.6 MG/DL (ref 8.4–25.7)
CALCIUM SERPL-MCNC: 9.5 MG/DL (ref 8.8–10)
CHLORIDE SERPL-SCNC: 101 MMOL/L (ref 98–107)
CO2 SERPL-SCNC: 26 MMOL/L (ref 23–31)
CREAT SERPL-MCNC: 0.85 MG/DL (ref 0.73–1.18)
EOSINOPHIL # BLD AUTO: 0.11 X10(3)/MCL (ref 0–0.9)
EOSINOPHIL NFR BLD AUTO: 2.1 %
ERYTHROCYTE [DISTWIDTH] IN BLOOD BY AUTOMATED COUNT: 21 % (ref 11.5–17)
GLOBULIN SER-MCNC: 3.1 GM/DL (ref 2.4–3.5)
GLUCOSE SERPL-MCNC: 91 MG/DL (ref 82–115)
HCT VFR BLD AUTO: 40.3 % (ref 42–52)
HGB BLD-MCNC: 12.6 GM/DL (ref 14–18)
IMM GRANULOCYTES # BLD AUTO: 0.03 X10(3)/MCL (ref 0–0.02)
IMM GRANULOCYTES NFR BLD AUTO: 0.6 % (ref 0–0.43)
IRON SERPL-MCNC: 44 UG/DL (ref 65–175)
LYMPHOCYTES # BLD AUTO: 0.9 X10(3)/MCL (ref 0.6–4.6)
LYMPHOCYTES NFR BLD AUTO: 16.9 %
MCH RBC QN AUTO: 29.4 PG (ref 27–31)
MCHC RBC AUTO-ENTMCNC: 31.3 MG/DL (ref 33–36)
MCV RBC AUTO: 94.2 FL (ref 80–94)
MONOCYTES # BLD AUTO: 0.56 X10(3)/MCL (ref 0.1–1.3)
MONOCYTES NFR BLD AUTO: 10.5 %
NEUTROPHILS # BLD AUTO: 3.7 X10(3)/MCL (ref 2.1–9.2)
NEUTROPHILS NFR BLD AUTO: 69.3 %
PLATELET # BLD AUTO: 480 X10(3)/MCL (ref 130–400)
PMV BLD AUTO: 8.8 FL (ref 9.4–12.4)
POTASSIUM SERPL-SCNC: 3.9 MMOL/L (ref 3.5–5.1)
PROT SERPL-MCNC: 7.4 GM/DL (ref 5.8–7.6)
RBC # BLD AUTO: 4.28 X10(6)/MCL (ref 4.7–6.1)
SODIUM SERPL-SCNC: 138 MMOL/L (ref 136–145)
WBC # SPEC AUTO: 5.3 X10(3)/MCL (ref 4.5–11.5)

## 2022-06-01 PROCEDURE — 99213 OFFICE O/P EST LOW 20 MIN: CPT | Mod: S$PBB,,, | Performed by: INTERNAL MEDICINE

## 2022-06-01 PROCEDURE — 83540 ASSAY OF IRON: CPT

## 2022-06-01 PROCEDURE — 99999 PR PBB SHADOW E&M-EST. PATIENT-LVL III: ICD-10-PCS | Mod: PBBFAC,,, | Performed by: INTERNAL MEDICINE

## 2022-06-01 PROCEDURE — 99213 OFFICE O/P EST LOW 20 MIN: CPT | Mod: PBBFAC | Performed by: INTERNAL MEDICINE

## 2022-06-01 PROCEDURE — 99999 PR PBB SHADOW E&M-EST. PATIENT-LVL III: CPT | Mod: PBBFAC,,, | Performed by: INTERNAL MEDICINE

## 2022-06-01 PROCEDURE — 99213 PR OFFICE/OUTPT VISIT, EST, LEVL III, 20-29 MIN: ICD-10-PCS | Mod: S$PBB,,, | Performed by: INTERNAL MEDICINE

## 2022-06-01 PROCEDURE — 85025 COMPLETE CBC W/AUTO DIFF WBC: CPT

## 2022-06-01 PROCEDURE — 36415 COLL VENOUS BLD VENIPUNCTURE: CPT

## 2022-06-01 PROCEDURE — 80053 COMPREHEN METABOLIC PANEL: CPT

## 2022-06-01 RX ORDER — ASPIRIN 81 MG/1
81 TABLET ORAL DAILY
COMMUNITY

## 2022-06-01 RX ORDER — HYDROXYUREA 500 MG/1
500 CAPSULE ORAL DAILY
COMMUNITY
End: 2022-09-22 | Stop reason: SDUPTHER

## 2022-06-01 RX ORDER — BUPROPION HYDROCHLORIDE 300 MG/1
300 TABLET ORAL DAILY
Status: ON HOLD | COMMUNITY
End: 2023-05-04 | Stop reason: HOSPADM

## 2022-08-05 ENCOUNTER — HOSPITAL ENCOUNTER (EMERGENCY)
Facility: HOSPITAL | Age: 67
Discharge: HOME | End: 2022-08-05
Attending: STUDENT IN AN ORGANIZED HEALTH CARE EDUCATION/TRAINING PROGRAM
Payer: MEDICARE

## 2022-08-05 VITALS
RESPIRATION RATE: 16 BRPM | BODY MASS INDEX: 36.39 KG/M2 | SYSTOLIC BLOOD PRESSURE: 137 MMHG | OXYGEN SATURATION: 95 % | DIASTOLIC BLOOD PRESSURE: 74 MMHG | WEIGHT: 212 LBS | TEMPERATURE: 98 F | HEART RATE: 83 BPM

## 2022-08-05 DIAGNOSIS — R41.0 CONFUSION: Primary | ICD-10-CM

## 2022-08-05 DIAGNOSIS — R06.00 DYSPNEA: ICD-10-CM

## 2022-08-05 LAB
ALBUMIN SERPL-MCNC: 4.1 GM/DL (ref 3.4–4.8)
ALBUMIN/GLOB SERPL: 1.1 RATIO (ref 1.1–2)
ALP SERPL-CCNC: 60 UNIT/L (ref 40–150)
ALT SERPL-CCNC: 35 UNIT/L (ref 0–55)
AST SERPL-CCNC: 28 UNIT/L (ref 5–34)
BASOPHILS # BLD AUTO: 0.05 X10(3)/MCL (ref 0–0.2)
BASOPHILS NFR BLD AUTO: 1 %
BILIRUBIN DIRECT+TOT PNL SERPL-MCNC: 0.4 MG/DL
BNP BLD-MCNC: 11.3 PG/ML
BUN SERPL-MCNC: 11.9 MG/DL (ref 8.4–25.7)
CALCIUM SERPL-MCNC: 9.5 MG/DL (ref 8.8–10)
CHLORIDE SERPL-SCNC: 103 MMOL/L (ref 98–107)
CO2 SERPL-SCNC: 25 MMOL/L (ref 23–31)
CREAT SERPL-MCNC: 0.85 MG/DL (ref 0.73–1.18)
EOSINOPHIL # BLD AUTO: 0.09 X10(3)/MCL (ref 0–0.9)
EOSINOPHIL NFR BLD AUTO: 1.8 %
ERYTHROCYTE [DISTWIDTH] IN BLOOD BY AUTOMATED COUNT: 14.9 % (ref 11.5–17)
GLOBULIN SER-MCNC: 3.9 GM/DL (ref 2.4–3.5)
GLUCOSE SERPL-MCNC: 113 MG/DL (ref 82–115)
HCT VFR BLD AUTO: 39 % (ref 42–52)
HGB BLD-MCNC: 12 GM/DL (ref 14–18)
IMM GRANULOCYTES # BLD AUTO: 0.07 X10(3)/MCL (ref 0–0.04)
IMM GRANULOCYTES NFR BLD AUTO: 1.4 %
LYMPHOCYTES # BLD AUTO: 0.84 X10(3)/MCL (ref 0.6–4.6)
LYMPHOCYTES NFR BLD AUTO: 16.6 %
MCH RBC QN AUTO: 28.8 PG (ref 27–31)
MCHC RBC AUTO-ENTMCNC: 30.8 MG/DL (ref 33–36)
MCV RBC AUTO: 93.8 FL (ref 80–94)
MONOCYTES # BLD AUTO: 0.48 X10(3)/MCL (ref 0.1–1.3)
MONOCYTES NFR BLD AUTO: 9.5 %
NEUTROPHILS # BLD AUTO: 3.5 X10(3)/MCL (ref 2.1–9.2)
NEUTROPHILS NFR BLD AUTO: 69.7 %
NRBC BLD AUTO-RTO: 0 %
PLATELET # BLD AUTO: 391 X10(3)/MCL (ref 130–400)
PMV BLD AUTO: 9.5 FL (ref 7.4–10.4)
POTASSIUM SERPL-SCNC: 4.2 MMOL/L (ref 3.5–5.1)
PROT SERPL-MCNC: 8 GM/DL (ref 5.8–7.6)
RBC # BLD AUTO: 4.16 X10(6)/MCL (ref 4.7–6.1)
SODIUM SERPL-SCNC: 135 MMOL/L (ref 136–145)
TROPONIN I SERPL-MCNC: 0.01 NG/ML (ref 0–0.04)
WBC # SPEC AUTO: 5.1 X10(3)/MCL (ref 4.5–11.5)

## 2022-08-05 PROCEDURE — 36415 COLL VENOUS BLD VENIPUNCTURE: CPT | Performed by: PHYSICIAN ASSISTANT

## 2022-08-05 PROCEDURE — 93010 EKG 12-LEAD: ICD-10-PCS | Mod: ,,, | Performed by: INTERNAL MEDICINE

## 2022-08-05 PROCEDURE — 25500020 PHARM REV CODE 255: Performed by: STUDENT IN AN ORGANIZED HEALTH CARE EDUCATION/TRAINING PROGRAM

## 2022-08-05 PROCEDURE — 99285 EMERGENCY DEPT VISIT HI MDM: CPT | Mod: 25

## 2022-08-05 PROCEDURE — 93010 ELECTROCARDIOGRAM REPORT: CPT | Mod: ,,, | Performed by: INTERNAL MEDICINE

## 2022-08-05 PROCEDURE — 93005 ELECTROCARDIOGRAM TRACING: CPT

## 2022-08-05 PROCEDURE — 83880 ASSAY OF NATRIURETIC PEPTIDE: CPT | Performed by: PHYSICIAN ASSISTANT

## 2022-08-05 PROCEDURE — 80053 COMPREHEN METABOLIC PANEL: CPT | Performed by: PHYSICIAN ASSISTANT

## 2022-08-05 PROCEDURE — 84484 ASSAY OF TROPONIN QUANT: CPT | Performed by: PHYSICIAN ASSISTANT

## 2022-08-05 PROCEDURE — 85025 COMPLETE CBC W/AUTO DIFF WBC: CPT | Performed by: PHYSICIAN ASSISTANT

## 2022-08-05 RX ADMIN — IOPAMIDOL 100 ML: 755 INJECTION, SOLUTION INTRAVENOUS at 05:08

## 2022-08-05 NOTE — ED PROVIDER NOTES
Encounter Date: 8/5/2022    SCRIBE #1 NOTE: I, Juan Tavares, am scribing for, and in the presence of,  Gray Rodriguez MD. I have scribed the following portions of the note - Other sections scribed: HPI, ROS, PE.       History     Chief Complaint   Patient presents with    Shortness of Breath     Pt presents c/o sob with ams. Onset this am.  Pt aaox4 in triage. PMH COVID + last week.  Denies CP.      65 y/o male with a history of HTN, HLD, and COPD presents to the ED with SOB and confusion after waking up this morning. Pt notes that he is no longer confused. He felt pain in his left arm a couple of days ago, and the pain is unchanged. Last Friday, he had a fever and tested positive for COVID-19. On Saturday and Sunday, he had a sore throat that gave him difficulty with swallowing. The sore throat was gone by Monday.    The history is provided by the patient. No  was used.   Shortness of Breath  This is a new problem. Episode onset: this morning. Pertinent negatives include no fever (had fever last Friday, but not anymore), no rhinorrhea, no sore throat (Had sore throat on Saturday and Sunday, but not anymore), no cough, no chest pain, no vomiting, no abdominal pain and no rash. Associated symptoms comments: Confusion in the morning, but not currently. Associated medical issues include COPD.     Review of patient's allergies indicates:  No Known Allergies  Past Medical History:   Diagnosis Date    COPD (chronic obstructive pulmonary disease)     Hyperlipidemia     Hypertension     Iron deficiency anemia     Polycythemia vera ELLIS-2+      Past Surgical History:   Procedure Laterality Date    APPENDECTOMY      BONE MARROW BIOPSY      COLONOSCOPY N/A     Excision of lipoma N/A     R foot    HEMORRHOID SURGERY      TONSILLECTOMY N/A      Family History   Problem Relation Age of Onset    Cancer Maternal Grandfather      Social History     Tobacco Use    Smoking status: Current Every Day  Smoker     Packs/day: 0.50     Years: 40.00     Pack years: 20.00     Types: Cigarettes    Smokeless tobacco: Never Used   Substance Use Topics    Alcohol use: Yes     Review of Systems   Constitutional: Negative for chills and fever (had fever last Friday, but not anymore).   HENT: Negative for congestion, rhinorrhea and sore throat (Had sore throat on Saturday and Sunday, but not anymore).    Eyes: Negative for visual disturbance.   Respiratory: Positive for shortness of breath. Negative for cough.    Cardiovascular: Negative for chest pain.   Gastrointestinal: Negative for abdominal pain, nausea and vomiting.   Genitourinary: Negative for dysuria and hematuria.   Musculoskeletal: Negative for joint swelling.        Left arm pain   Skin: Negative for rash.   Neurological: Negative for weakness.   Psychiatric/Behavioral: Positive for confusion (In the morning, but not anymore).   All other systems reviewed and are negative.      Physical Exam     Initial Vitals [08/05/22 1131]   BP Pulse Resp Temp SpO2   136/82 77 16 98.1 °F (36.7 °C) 97 %      MAP       --         Physical Exam    Nursing note and vitals reviewed.  Constitutional: He is not diaphoretic. No distress.   HENT:   Head: Normocephalic and atraumatic.   Eyes: EOM are normal. Pupils are equal, round, and reactive to light.   Neck: Neck supple.   Normal range of motion.  Cardiovascular: Normal rate and regular rhythm.   No murmur heard.  Pulmonary/Chest: Breath sounds normal. No respiratory distress. He has no wheezes. He has no rales.   Abdominal: Abdomen is soft. He exhibits no distension. There is no abdominal tenderness.   Musculoskeletal:         General: No edema. Normal range of motion.      Cervical back: Normal range of motion and neck supple.      Comments: Tenderness in the left scapular posterior shoulder area with full active and passive ROM; no rash, swelling, or erythema     Neurological: He is alert and oriented to person, place, and  time. He has normal strength. No cranial nerve deficit.   Skin: Skin is warm. No rash noted.   Psychiatric: He has a normal mood and affect.         ED Course   Procedures  Labs Reviewed   COMPREHENSIVE METABOLIC PANEL - Abnormal; Notable for the following components:       Result Value    Sodium Level 135 (*)     Protein Total 8.0 (*)     Globulin 3.9 (*)     All other components within normal limits   CBC WITH DIFFERENTIAL - Abnormal; Notable for the following components:    RBC 4.16 (*)     Hgb 12.0 (*)     Hct 39.0 (*)     MCHC 30.8 (*)     IG# 0.07 (*)     All other components within normal limits   TROPONIN I - Normal   B-TYPE NATRIURETIC PEPTIDE - Normal   CBC W/ AUTO DIFFERENTIAL    Narrative:     The following orders were created for panel order CBC auto differential.  Procedure                               Abnormality         Status                     ---------                               -----------         ------                     CBC with Differential[[id number]]        Abnormal            Final result                 Please view results for these tests on the individual orders.        ECG Results          EKG 12-lead (Final result)  Result time 08/06/22 00:08:32    Final result by Yennifer, Lab In [facility] (08/06/22 00:08:32)                 Narrative:    Test Reason : R07.9,    Vent. Rate : 068 BPM     Atrial Rate : 068 BPM     P-R Int : 144 ms          QRS Dur : 106 ms      QT Int : 396 ms       P-R-T Axes : 083 -16 010 degrees     QTc Int : 421 ms    Normal sinus rhythm with sinus arrhythmia  Normal ECG  No previous ECGs available  Confirmed by Jerardo Mgcee MD ([id number]) on 8/6/2022 12:08:20 AM    Referred By:             Confirmed By:Jerardo Mcgee MD                            Imaging Results          CTA Chest Non-Coronary - PE Study (Final result)  Result time 08/05/22 17:29:02    Final result by Herminia Kern MD (08/05/22 17:29:02)                 Impression:      No pulmonary embolism  seen    Some changes of COPD in the lungs bilaterally    Bilateral adrenal nodules as outlined above.  The right adrenal nodule has a area of fat within it and suggests a benign lesion.  Left-sided adrenal nodule is indeterminate.  MRI or CT scan adrenal mass protocol can be performed on an outpatient nonemergent basis to further characterize these lesions.    Hepatomegaly      Electronically signed by: Ariellashimonbeatrice Bradfordyamile  Date:    08/05/2022  Time:    17:29             Narrative:    EXAMINATION:  CTA CHEST NON CORONARY    CLINICAL HISTORY:  shortness of breath, covid;    TECHNIQUE:  Low dose axial images, sagittal and coronal reformations were obtained from the thoracic inlet to the lung bases following the IV administration of contrast..  Contrast timing was optimized to evaluate the pulmonary arteries.  MIP images were performed.    COMPARISON:  None    FINDINGS:  There are some changes consistent with COPD in the lungs bilaterally and some chronic interstitial lung changes seen bilaterally..  No mass is seen.  No pleural effusion is seen.  No pleural thickening is seen.  No pneumothorax is seen.    No pulmonary embolism is seen.  Pulmonary arteries are well opacified and well visualized.    The thoracic aorta appears normal.  No mediastinal lymphadenopathy is seen.  The heart appears normal.    There is evidence of hepatomegaly seen in the upper abdomen.  There is a large adrenal nodule seen on the right side which has a heterogeneous appearance but there is of focal area of fat seen within the lesion.  This suggest a benign lesion.  The adrenal nodule measures 4.8 x 4.7 cm.  There is also a left adrenal nodule seen that measures 1.6 by 1.7 cm.  Hounsfield units of this nodule are indeterminate..                               CT Head Without Contrast (Final result)  Result time 08/05/22 17:28:09    Final result by Sarah Stafford MD (08/05/22 17:28:09)                 Impression:      No acute intracranial  abnormalities.      Electronically signed by: Sarah Stafford  Date:    08/05/2022  Time:    17:28             Narrative:    EXAMINATION:  CT HEAD WITHOUT CONTRAST    CLINICAL HISTORY:  altered mental status;    TECHNIQUE:  Axial scans were obtained from skull base to the vertex.    Coronal and sagittal reconstructions obtained from the axial data.    Automatic exposure control was utilized to limit radiation dose.    Contrast: None    Radiation Dose:    Total DLP: 951 mGy*cm    COMPARISON:  None    FINDINGS:  Scalp/Skull:    No abnormalities.    Brain sulci: Appropriate for patient's age.    Ventricles: Normal in size and configuration. No hydrocephalus.    Extra-axial spaces:    No masses or fluid collections.    Parenchyma:    Mild chronic microangiopathy in the supratentorial white matter.    No mass, hemorrhage or CT evidence of an acute vascular insult.    Dural sinuses: No abnormal densities.    Sellar/Suprasellar region: No abnormalities.    Skull base and Craniocervical junction: No abnormalities.    Incidental findings:    Carotid siphon and vertebrobasilar atherosclerotic vascular calcifications.    Scattered mucosal thickening in the paranasal sinuses.    Bilateral maxillary sinus polyps versus retention cysts.                               X-Ray Shoulder 2 or More Views Left (Final result)  Result time 08/05/22 17:05:33    Final result by Sarah Stafford MD (08/05/22 17:05:33)                 Impression:      No acute osseous abnormality.      Electronically signed by: Sarah Stafford  Date:    08/05/2022  Time:    17:05             Narrative:    EXAMINATION:  XR SHOULDER COMPLETE 2 OR MORE VIEWS LEFT    CLINICAL HISTORY:  Shoulder pain;    COMPARISON:  None.    FINDINGS:  No acute displaced fractures or dislocations.    Mild-moderate degenerative changes.    No blastic or lytic lesions.    Punctate metallic density projects in the left supraclavicular region.    Otherwise soft tissues within normal  limits.    Partially visualized lungs clear.                               X-Ray Chest PA And Lateral (Final result)  Result time 08/05/22 12:00:01    Final result by Adams Ramos MD (08/05/22 12:00:01)                 Impression:      No acute chest disease is identified.      Electronically signed by: Adams Ramos  Date:    08/05/2022  Time:    12:00             Narrative:    EXAMINATION:  XR CHEST PA AND LATERAL    CLINICAL HISTORY:  Chest Pain;, .    FINDINGS:  No alveolar consolidation, effusion, or pneumothorax is seen.   The thoracic aorta is normal  cardiac silhouette, central pulmonary vessels and mediastinum are normal in size and are grossly unremarkable.   visualized osseous structures are grossly unremarkable.                              X-Rays:   Independently Interpreted Readings:   Chest X-Ray: No acute abnormalities.   Head CT: No hemorrhage.     Medications   iopamidoL (ISOVUE-370) injection 100 mL (100 mLs Intravenous Given 8/5/22 1712)     Medical Decision Making:   History:   Old Medical Records: I decided to obtain old medical records.  Initial Assessment:   67 yo with intermittent SOB, confusion. All resolved at this time. COVID+ a week ago. Patient well appearing, no distress. Aox4, normal vitals. Workup here including CT head and CTA PE unremarkable. Discharge with close PCP follow up. Incidental adrena nodules on CT - explained findings to patient and daughter and that he needs to follow up with PCP regarding these findings.    Independently Interpreted Test(s):   I have ordered and independently interpreted X-rays - see prior notes.  I have ordered and independently interpreted EKG Reading(s) - see prior notes  Clinical Tests:   Lab Tests: Ordered and Reviewed  Radiological Study: Ordered and Reviewed  Medical Tests: Ordered and Reviewed          Scribe Attestation:   Scribe #1: I performed the above scribed service and the documentation accurately describes the services I  performed. I attest to the accuracy of the note.    Attending Attestation:           Physician Attestation for Scribe:  Physician Attestation Statement for Scribe #1: I, Gray Rodriguez MD, reviewed documentation, as scribed by Juan Tavares in my presence, and it is both accurate and complete.             ED Course as of 08/06/22 1246   Fri Aug 05, 2022   1740 No PE, no acute changes on CT. Adrenal nodules noted ,patient updated. Patient is back to baseline at this time, feels comfortable going home. Will discharge with close PCP follow up.  [AC]      ED Course User Index  [AC] Gray Rodriguez IV, MD             Clinical Impression:   Final diagnoses:  [R06.00] Dyspnea  [R41.0] Confusion (Primary)          ED Disposition Condition    Discharge Stable        ED Prescriptions     None        Follow-up Information     Follow up With Specialties Details Why Contact Info    Nick Suazo MD Family Medicine Schedule an appointment as soon as possible for a visit   [address] [facility] [address]  [phone]      [facility] - Emergency Dept Emergency Medicine Go to  If symptoms worsen [facility] [address] [phone]      IGray MD personally performed the history, PE, MDM, and procedures as documented above and agree with the scribe's documentation.        Gray Rodriguez IV, MD  08/06/22 [id number]

## 2022-08-05 NOTE — ED TRIAGE NOTES
Pt presents c/o sob with ams. Onset this am. Pt aaox4 in triage. PMH COVID + last week. Denies CP.Hx of COPD

## 2022-08-05 NOTE — FIRST PROVIDER EVALUATION
Medical screening exam completed.  I have conducted a focused provider triage encounter, findings are as follows:    Brief history of present illness:  67 y/o male presents to the ED with shortness of breath and confusion.  Reports tested positive for covid about 1 week ago.  HX of polycythemia vera     There were no vitals filed for this visit.    Pertinent physical exam:  Patient is not in any respiratory distress.  Lungs CTAB.      Brief workup plan:  Labs, EKG, UA, CXR, CT    Preliminary workup initiated; this workup will be continued and followed by the physician or advanced practice provider that is assigned to the patient when roomed.

## 2022-09-22 DIAGNOSIS — D45 PV (POLYCYTHEMIA VERA): Primary | ICD-10-CM

## 2022-09-22 RX ORDER — HYDROXYUREA 500 MG/1
1000 CAPSULE ORAL DAILY
Qty: 60 CAPSULE | Refills: 5 | Status: SHIPPED | OUTPATIENT
Start: 2022-09-22 | End: 2023-03-21

## 2022-10-14 NOTE — PROGRESS NOTES
Subjective:       Patient ID: Richard Healy is a 66 y.o. male.    Chief Complaint:  I went to the ER     HPI  Diagnosis: ELLIS-2+ Polycythemia vera                     Iron deficiency anemia                     + COVID-19 vaccinated     Current Treatment: Hydrea 1000 mg daily; ASA 81 mg daily  Treatment History: Feraheme 3/21; Injectafer 4/22    Clinical History:  Patient was initially referred to Dr. Christensen in [place] in 2017 for an abnormal CBC. Platelet count was 739 and hemoglobin level was 18.6. He was started on Hydrea pending further workup. PCR for BCR-ABL was negative. Bone marrow 5/11/17 was hypercellular 70% with trilineage hematopoiesis and no significant dysplasia. Iron stores were absent. There was no significant fibrosis. 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 Following Dr. Christensen's correction, care was transferred to Dr. Taylor. He had never undergone therapeutic phlebotomy. Beginning in July and September 2020, he began developing mild anemia with microcytic, hypochromic red cell indices. He also had progressive thrombocytosis. Iron profile 10/29/20 showed evidence of iron deficiency anemia with a serum ferritin level of 8.9. He was treated with an oral iron supplement but had progressive anemia with associated fatigue and progressive thrombocytopenia. He also had symptoms of pica for Cherry tomatoes and cucumbers. Follow-up testing 2/12/21 showed a hemoglobin of 11.1 and hematocrit 38.2. WBC was 9.2 and platelet count had increased to 739. Serum iron was 20, TIBC 488, saturation 4% and ferritin level 5.6 ng/mL. B12 and folic acid levels were normal. He was treated with IV Feraheme 510 mg x 2 doses 3/21 with symptomatic improvement.    He transferred his care to [facility] 3/10/21 due to the relocation of Dr. Taylor. Counts improved following IV iron therapy.  Hydrea was resumed 4/1/21 at dose of 500 mg alternating with 1000 mg daily.  He  developed recurrent iron deficiency anemia with a hemoglobin of 11.4 and serum ferritin level 14.6 ng/mL.  His WBC and platelet counts were normal on Hydrea.  He had recurrent PICA symptoms for cherry tomatoes.  Was treated with a single dose of Injectafer 750 mg on 04/11/22.  His energy level improved following treatment.    EGD and colonoscopy 2022 was remarkable only for internal hemorrhoids.    Interval History  Mr. Healy is here today by himself for a four-month hematology follow-up visit.  He is taking his Hydrea daily as directed.  His dose is 1000 mg daily.  He has occasional pruritus of the left upper extremity, which is barely noticeable in his opinion.  He denies any early satiety, night sweats, unexplained weight loss or recurring infections.  He had COVID-19 in July.  In early August (8/5), he presented to the emergency department with confusion.  Work-up including CBC, CMP, CTA, and CT head, all of which were negative.  He attributed to his recent infection.  He has not had recurring symptoms.  He denies any side effects associated with his Hydrea.  Laboratory in the office today shows stable mild anemia with a hemoglobin of 11.8.  WBC and platelet count are normal.  Iron studies reveal recurrent iron deficiency with a serum ferritin level of 17.  He last received IV iron replacement 4/22.    He has mild PICA symptoms 4 cherr tomatoes and cucumbers, but not as strong as prior episodes.  Laboratory results were discussed today with the patient.    Review of Systems   Constitutional:  Negative for activity change, appetite change, chills, fatigue, fever and unexpected weight change (+ weight gain).   HENT:  Negative for nasal congestion, hearing loss, mouth sores, sore throat and trouble swallowing.    Eyes: Negative.  Negative for pain and visual disturbance.   Respiratory:  Negative for cough, chest tightness, shortness of breath and wheezing.    Cardiovascular:  Negative for chest pain, palpitations  "and leg swelling.   Gastrointestinal:  Negative for abdominal distention, abdominal pain, blood in stool (Intermittent due to hemorrhoids), change in bowel habit, constipation, diarrhea, nausea, vomiting and change in bowel habit.   Endocrine: Negative.    Genitourinary:  Negative for difficulty urinating, dysuria, frequency, hematuria and urgency.   Musculoskeletal:  Negative for arthralgias, back pain, joint swelling, myalgias and neck pain (Chronic).   Integumentary:  Negative for rash, wound and mole/lesion.        No pruritus   Allergic/Immunologic: Negative.    Neurological:  Negative for dizziness, vertigo, syncope, speech difficulty, weakness, numbness, headaches, coordination difficulties, memory loss and coordination difficulties.   Hematological:  Negative for adenopathy. Does not bruise/bleed easily.   Psychiatric/Behavioral:  Negative for behavioral problems and dysphoric mood.      PMHx:  HTN, hyperlipidemia, COPD  PSHx:  Tonsils, appendectomy, hemorrhoids, R foot lipoma, bone marrow, colonoscopy  SH:  + Tobacco 1.5 PPD > 40 years. + Social alcohol use. 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  FH:  Maternal grandfather had cancer of unknown type. No family history of blood disorders.       Objective:        /78 (BP Location: Right arm, Patient Position: Sitting, BP Method: Large (Automatic))   Pulse 70   Temp 97.8 °F (36.6 °C)   Resp 17   Ht 5' 4" (1.626 m)   Wt 98.4 kg (217 lb)   SpO2 97%   BMI 37.25 kg/m²    Physical Exam  Constitutional:       General: He is not in acute distress.     Appearance: He is obese.   HENT:      Head: Normocephalic and atraumatic.      Nose: Nose normal.      Mouth/Throat:      Mouth: Mucous membranes are moist.      Pharynx: Oropharynx is clear. No posterior oropharyngeal erythema.   Eyes:      Extraocular Movements: Extraocular movements intact.      Conjunctiva/sclera: Conjunctivae normal.      Pupils: Pupils are equal, round, and " reactive to light.   Cardiovascular:      Rate and Rhythm: Normal rate and regular rhythm.      Heart sounds: No murmur heard.  Pulmonary:      Comments: Decreased breath sounds at the bases, otherwise clear  Abdominal:      General: Bowel sounds are normal. There is no distension.      Palpations: Abdomen is soft. There is no mass.      Tenderness: There is no abdominal tenderness.      Comments: Obese, No splenomegaly   Musculoskeletal:         General: No swelling or tenderness. Normal range of motion.      Cervical back: Normal range of motion and neck supple. No tenderness.   Lymphadenopathy:      Cervical: No cervical adenopathy.   Skin:     General: Skin is warm and dry.      Findings: No lesion or rash.   Neurological:      General: No focal deficit present.      Mental Status: He is alert and oriented to person, place, and time.      Cranial Nerves: No cranial nerve deficit.      Gait: Gait normal.        LABORATORY  Recent Results (from the past 168 hour(s))   Iron and TIBC    Collection Time: 10/18/22  9:25 AM   Result Value Ref Range    Iron Binding Capacity Unsaturated 387 (H) 69 - 240 ug/dL    Iron Level 37 (L) 65 - 175 ug/dL    Iron Binding Capacity Total 424 250 - 450 ug/dL    Iron Saturation 9 (L) 20 - 50 %   Ferritin    Collection Time: 10/18/22  9:25 AM   Result Value Ref Range    Ferritin Level 17.84 (L) 21.81 - 274.66 ng/mL   CBC with Differential    Collection Time: 10/18/22  9:25 AM   Result Value Ref Range    WBC 5.7 4.5 - 11.5 x10(3)/mcL    RBC 4.49 (L) 4.70 - 6.10 x10(6)/mcL    Hgb 11.8 (L) 14.0 - 18.0 gm/dL    Hct 39.0 (L) 42.0 - 52.0 %    MCV 86.9 80.0 - 94.0 fL    MCH 26.3 (L) 27.0 - 31.0 pg    MCHC 30.3 (L) 33.0 - 36.0 mg/dL    RDW 15.8 11.5 - 17.0 %    Platelet 326 130 - 400 x10(3)/mcL    MPV 8.9 7.4 - 10.4 fL    Neut % 63.6 %    Lymph % 20.1 %    Mono % 12.1 %    Eos % 3.0 %    Basophil % 0.5 %    Lymph # 1.14 0.6 - 4.6 x10(3)/mcL    Neut # 3.6 2.1 - 9.2 x10(3)/mcL    Mono # 0.69 0.1  - 1.3 x10(3)/mcL    Eos # 0.17 0 - 0.9 x10(3)/mcL    Baso # 0.03 0 - 0.2 x10(3)/mcL    IG# 0.04 0 - 0.04 x10(3)/mcL    IG% 0.7 %            Assessment:   ELLIS-2+ Polcythemia vera  Recurrent iron deficiency anemia      Plan:   Laboratory reveals recurrent iron deficiency anemia.    Remainder of CBC is at goal on Hydrea.    Continue Hydrea 1000 mg daily.    With regard to his iron deficiency, I recommended re-treatment with Injectafer 750 mg IV x2 doses 1 week apart.    Benefits, risks, and toxicities of the recommended treatment were reviewed and discussed today with the patient.  He is in agreement with the recommended treatment plan.  He will be scheduled once insurance authorization is obtained.    Return to clinic in 4 months for follow-up with CBC and ferritin level.  All questions answered to the satisfaction of the patient.    GORDON DELGADO, FNP-C     Other Physicians  Dr. Nick Suazo

## 2022-10-18 ENCOUNTER — OFFICE VISIT (OUTPATIENT)
Dept: HEMATOLOGY/ONCOLOGY | Facility: CLINIC | Age: 67
End: 2022-10-18
Payer: MEDICARE

## 2022-10-18 VITALS
DIASTOLIC BLOOD PRESSURE: 78 MMHG | OXYGEN SATURATION: 97 % | BODY MASS INDEX: 37.05 KG/M2 | RESPIRATION RATE: 17 BRPM | HEIGHT: 64 IN | TEMPERATURE: 98 F | WEIGHT: 217 LBS | SYSTOLIC BLOOD PRESSURE: 122 MMHG | HEART RATE: 70 BPM

## 2022-10-18 DIAGNOSIS — D45 POLYCYTHEMIA VERA: Primary | ICD-10-CM

## 2022-10-18 DIAGNOSIS — D50.9 IRON DEFICIENCY ANEMIA, UNSPECIFIED IRON DEFICIENCY ANEMIA TYPE: ICD-10-CM

## 2022-10-18 PROCEDURE — 99213 OFFICE O/P EST LOW 20 MIN: CPT | Mod: PBBFAC | Performed by: NURSE PRACTITIONER

## 2022-10-18 PROCEDURE — 99214 OFFICE O/P EST MOD 30 MIN: CPT | Mod: S$PBB,,, | Performed by: NURSE PRACTITIONER

## 2022-10-18 PROCEDURE — 99999 PR PBB SHADOW E&M-EST. PATIENT-LVL III: ICD-10-PCS | Mod: PBBFAC,,, | Performed by: NURSE PRACTITIONER

## 2022-10-18 PROCEDURE — 99999 PR PBB SHADOW E&M-EST. PATIENT-LVL III: CPT | Mod: PBBFAC,,, | Performed by: NURSE PRACTITIONER

## 2022-10-18 PROCEDURE — 99214 PR OFFICE/OUTPT VISIT, EST, LEVL IV, 30-39 MIN: ICD-10-PCS | Mod: S$PBB,,, | Performed by: NURSE PRACTITIONER

## 2022-10-18 RX ORDER — SODIUM CHLORIDE 0.9 % (FLUSH) 0.9 %
10 SYRINGE (ML) INJECTION
Status: CANCELLED | OUTPATIENT
Start: 2022-10-25

## 2022-10-18 RX ORDER — HEPARIN 100 UNIT/ML
5 SYRINGE INTRAVENOUS
Status: CANCELLED | OUTPATIENT
Start: 2022-10-25

## 2022-10-18 RX ORDER — SODIUM CHLORIDE 9 MG/ML
INJECTION, SOLUTION INTRAVENOUS CONTINUOUS
Status: CANCELLED | OUTPATIENT
Start: 2022-10-25

## 2022-10-18 RX ORDER — DIPHENHYDRAMINE HYDROCHLORIDE 50 MG/ML
50 INJECTION INTRAMUSCULAR; INTRAVENOUS ONCE AS NEEDED
Status: CANCELLED | OUTPATIENT
Start: 2022-10-25

## 2022-10-18 RX ORDER — EPINEPHRINE 0.3 MG/.3ML
0.3 INJECTION SUBCUTANEOUS ONCE AS NEEDED
Status: CANCELLED | OUTPATIENT
Start: 2022-10-25

## 2022-10-18 RX ORDER — METHYLPREDNISOLONE SOD SUCC 125 MG
125 VIAL (EA) INJECTION ONCE AS NEEDED
Status: CANCELLED | OUTPATIENT
Start: 2022-10-25

## 2022-10-20 ENCOUNTER — INFUSION (OUTPATIENT)
Dept: INFUSION THERAPY | Facility: HOSPITAL | Age: 67
End: 2022-10-20
Attending: NURSE PRACTITIONER
Payer: MEDICARE

## 2022-10-20 VITALS
TEMPERATURE: 98 F | RESPIRATION RATE: 18 BRPM | SYSTOLIC BLOOD PRESSURE: 125 MMHG | DIASTOLIC BLOOD PRESSURE: 82 MMHG | HEART RATE: 75 BPM | OXYGEN SATURATION: 98 %

## 2022-10-20 DIAGNOSIS — D50.9 IRON DEFICIENCY ANEMIA, UNSPECIFIED IRON DEFICIENCY ANEMIA TYPE: Primary | ICD-10-CM

## 2022-10-20 PROCEDURE — 25000003 PHARM REV CODE 250: Performed by: NURSE PRACTITIONER

## 2022-10-20 PROCEDURE — 96365 THER/PROPH/DIAG IV INF INIT: CPT

## 2022-10-20 PROCEDURE — 63600175 PHARM REV CODE 636 W HCPCS: Mod: JG | Performed by: NURSE PRACTITIONER

## 2022-10-20 RX ORDER — SODIUM CHLORIDE 0.9 % (FLUSH) 0.9 %
10 SYRINGE (ML) INJECTION
Status: DISCONTINUED | OUTPATIENT
Start: 2022-10-20 | End: 2022-10-20 | Stop reason: HOSPADM

## 2022-10-20 RX ORDER — HEPARIN 100 UNIT/ML
5 SYRINGE INTRAVENOUS
Status: CANCELLED | OUTPATIENT
Start: 2022-10-27

## 2022-10-20 RX ORDER — METHYLPREDNISOLONE SOD SUCC 125 MG
125 VIAL (EA) INJECTION ONCE AS NEEDED
Status: CANCELLED | OUTPATIENT
Start: 2022-10-27

## 2022-10-20 RX ORDER — SODIUM CHLORIDE 0.9 % (FLUSH) 0.9 %
10 SYRINGE (ML) INJECTION
Status: CANCELLED | OUTPATIENT
Start: 2022-10-27

## 2022-10-20 RX ORDER — HEPARIN 100 UNIT/ML
5 SYRINGE INTRAVENOUS
Status: DISCONTINUED | OUTPATIENT
Start: 2022-10-20 | End: 2022-10-20 | Stop reason: HOSPADM

## 2022-10-20 RX ORDER — SODIUM CHLORIDE 9 MG/ML
INJECTION, SOLUTION INTRAVENOUS CONTINUOUS
Status: CANCELLED | OUTPATIENT
Start: 2022-10-27

## 2022-10-20 RX ORDER — EPINEPHRINE 0.3 MG/.3ML
0.3 INJECTION SUBCUTANEOUS ONCE AS NEEDED
Status: CANCELLED | OUTPATIENT
Start: 2022-10-27

## 2022-10-20 RX ORDER — DIPHENHYDRAMINE HYDROCHLORIDE 50 MG/ML
50 INJECTION INTRAMUSCULAR; INTRAVENOUS ONCE AS NEEDED
Status: CANCELLED | OUTPATIENT
Start: 2022-10-27

## 2022-10-20 RX ORDER — SODIUM CHLORIDE 9 MG/ML
INJECTION, SOLUTION INTRAVENOUS CONTINUOUS
Status: DISCONTINUED | OUTPATIENT
Start: 2022-10-20 | End: 2022-10-20 | Stop reason: HOSPADM

## 2022-10-20 RX ADMIN — FERRIC CARBOXYMALTOSE INJECTION 750 MG: 50 INJECTION, SOLUTION INTRAVENOUS at 10:10

## 2022-10-27 ENCOUNTER — INFUSION (OUTPATIENT)
Dept: INFUSION THERAPY | Facility: HOSPITAL | Age: 67
End: 2022-10-27
Attending: NURSE PRACTITIONER
Payer: MEDICARE

## 2022-10-27 VITALS
HEIGHT: 64 IN | BODY MASS INDEX: 37.04 KG/M2 | WEIGHT: 216.94 LBS | HEART RATE: 88 BPM | SYSTOLIC BLOOD PRESSURE: 130 MMHG | RESPIRATION RATE: 20 BRPM | DIASTOLIC BLOOD PRESSURE: 84 MMHG | TEMPERATURE: 98 F

## 2022-10-27 DIAGNOSIS — D50.9 IRON DEFICIENCY ANEMIA, UNSPECIFIED IRON DEFICIENCY ANEMIA TYPE: Primary | ICD-10-CM

## 2022-10-27 PROCEDURE — 96365 THER/PROPH/DIAG IV INF INIT: CPT

## 2022-10-27 PROCEDURE — 63600175 PHARM REV CODE 636 W HCPCS: Mod: JG | Performed by: NURSE PRACTITIONER

## 2022-10-27 PROCEDURE — 25000003 PHARM REV CODE 250: Performed by: NURSE PRACTITIONER

## 2022-10-27 RX ORDER — SODIUM CHLORIDE 9 MG/ML
INJECTION, SOLUTION INTRAVENOUS CONTINUOUS
Status: DISCONTINUED | OUTPATIENT
Start: 2022-10-27 | End: 2022-10-27 | Stop reason: HOSPADM

## 2022-10-27 RX ORDER — SODIUM CHLORIDE 0.9 % (FLUSH) 0.9 %
10 SYRINGE (ML) INJECTION
Status: DISCONTINUED | OUTPATIENT
Start: 2022-10-27 | End: 2022-10-27 | Stop reason: HOSPADM

## 2022-10-27 RX ORDER — METHYLPREDNISOLONE SOD SUCC 125 MG
125 VIAL (EA) INJECTION ONCE AS NEEDED
Status: CANCELLED | OUTPATIENT
Start: 2022-10-27

## 2022-10-27 RX ORDER — SODIUM CHLORIDE 9 MG/ML
INJECTION, SOLUTION INTRAVENOUS CONTINUOUS
Status: CANCELLED | OUTPATIENT
Start: 2022-10-27

## 2022-10-27 RX ORDER — EPINEPHRINE 0.3 MG/.3ML
0.3 INJECTION SUBCUTANEOUS ONCE AS NEEDED
Status: CANCELLED | OUTPATIENT
Start: 2022-10-27

## 2022-10-27 RX ORDER — HEPARIN 100 UNIT/ML
5 SYRINGE INTRAVENOUS
Status: CANCELLED | OUTPATIENT
Start: 2022-10-27

## 2022-10-27 RX ORDER — HEPARIN 100 UNIT/ML
5 SYRINGE INTRAVENOUS
Status: DISCONTINUED | OUTPATIENT
Start: 2022-10-27 | End: 2022-10-27 | Stop reason: HOSPADM

## 2022-10-27 RX ORDER — DIPHENHYDRAMINE HYDROCHLORIDE 50 MG/ML
50 INJECTION INTRAMUSCULAR; INTRAVENOUS ONCE AS NEEDED
Status: CANCELLED | OUTPATIENT
Start: 2022-10-27

## 2022-10-27 RX ORDER — SODIUM CHLORIDE 0.9 % (FLUSH) 0.9 %
10 SYRINGE (ML) INJECTION
Status: CANCELLED | OUTPATIENT
Start: 2022-10-27

## 2022-10-27 RX ADMIN — SODIUM CHLORIDE: 9 INJECTION, SOLUTION INTRAVENOUS at 10:10

## 2022-10-27 RX ADMIN — FERRIC CARBOXYMALTOSE INJECTION 750 MG: 50 INJECTION, SOLUTION INTRAVENOUS at 10:10

## 2022-10-27 NOTE — PLAN OF CARE
Injectafer # 2 of 2 doses infused. Tolerated treatment without adverse events. Discharged in stable condition.

## 2023-01-06 DIAGNOSIS — K42.9 UMBILICAL HERNIA: Primary | ICD-10-CM

## 2023-02-14 NOTE — PROGRESS NOTES
Subjective:       Patient ID: Richard Healy is a 67 y.o. male.    Chief Complaint:  I feel okay    Diagnosis: ELLIS-2+ Polycythemia vera                    Recurrent iron deficiency anemia                     + COVID-19 vaccinated     Treatment History  Feraheme 3/21  Injectafer 4/22 and 10/22    Current Treatment: Hydrea 1000 mg daily; ASA 81 mg daily    Clinical History:  Patient was initially referred to Dr. Christensen in [place] in 2017 for an abnormal CBC. Platelet count was 739 and hemoglobin level was 18.6. He was started on Hydrea pending further workup. PCR for BCR-ABL was negative. Bone marrow 5/11/17 was hypercellular 70% with trilineage hematopoiesis and no significant dysplasia. Iron stores were absent. There was no significant fibrosis. 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 Following Dr. Christensen's halfway, care was transferred to Dr. Taylor. He had never undergone therapeutic phlebotomy. Beginning in July and September 2020, he began developing mild anemia with microcytic, hypochromic red cell indices. He also had progressive thrombocytosis. Iron profile 10/29/20 showed evidence of iron deficiency anemia with a serum ferritin level of 8.9. He was treated with an oral iron supplement but had progressive anemia with associated fatigue and progressive thrombocytopenia. He also had symptoms of pica for Cherry tomatoes and cucumbers. Follow-up testing 2/12/21 showed a hemoglobin of 11.1 and hematocrit 38.2. WBC was 9.2 and platelet count had increased to 739. Serum iron was 20, TIBC 488, saturation 4% and ferritin level 5.6 ng/mL. B12 and folic acid levels were normal. He was treated with IV Feraheme 510 mg x 2 doses 3/21 with symptomatic improvement.    He transferred his care to [facility] 3[facility]10[facility]21 due to the relocation of Dr. Taylor. Counts improved following IV iron therapy.  Hydrea was resumed 4/1/21 at dose of 500 mg alternating with 1000 mg daily.   He developed recurrent iron deficiency anemia with a hemoglobin of 11.4 and serum ferritin level 14.6 ng/mL.  His WBC and platelet counts were normal on Hydrea.  He had recurrent PICA symptoms for cherry tomatoes.  Was treated with a single dose of Injectafer 750 mg on 04/11/22.  His energy level improved following treatment.    EGD and colonoscopy 2022 was remarkable only for internal hemorrhoids.  Required re-treatment with IV Injectafer x 2 doses 10/22 for recurrent iron deficiency anemia with a hemoglobin of 11.8 and serum ferritin level of 17 ng/mL.  He had mild PICA symptoms for cherry tomatoes and cucumbers.    Interval History  He returns to the office today by himself for a four-month follow-up visit.  He remains on Hydrea 1000 mg daily.  He did not really appreciate any differences following the iron infusions 10/22.  His cravings for cherry tomatoes and cucumbers did diminish.  His hemoglobin level remains essentially the same.  Follow-up ferritin level is pending.  He reports fairly significant hemorrhoidal bleeding which is the likely source of his recurrent iron deficiency.  He has no abdominal symptoms or complaints.  He has an upcoming appointment with surgery for evaluation of an inguinal hernia.      Review of Systems   Constitutional:  Negative for activity change, fatigue, fever and unexpected weight change.   HENT:  Negative for nasal congestion, hearing loss, mouth sores, sore throat and trouble swallowing.    Eyes: Negative.    Respiratory:  Negative for cough, shortness of breath and wheezing.    Cardiovascular:  Negative for chest pain, palpitations and leg swelling.   Gastrointestinal:  Negative for abdominal distention, abdominal pain, blood in stool, constipation, diarrhea and nausea.   Endocrine: Negative.    Genitourinary:  Negative for dysuria, frequency, hematuria and urgency.   Musculoskeletal:  Positive for neck pain (Chronic). Negative for arthralgias and back pain.  "  Integumentary:  Negative for rash and mole/lesion.        No pruritus   Allergic/Immunologic: Negative.    Neurological:  Negative for dizziness, weakness, numbness and headaches.   Hematological:  Negative for adenopathy. Does not bruise/bleed easily.   Psychiatric/Behavioral: Negative.         PMHx:  HTN, hyperlipidemia, COPD  PSHx:  Tonsils, appendectomy, hemorrhoids, R foot lipoma, bone marrow, colonoscopy  SH:  + Tobacco 1.5 PPD > 40 years. + Social alcohol use. Lives in [place] with his wife, retired oilfield consultant.  FH:  Maternal grandfather had cancer of unknown type. No family history of blood disorders.     Objective:        BP (!) 177/91   Pulse 93   Temp 97 °F (36.1 °C)   Resp 18   Ht 5' 4" (1.626 m)   Wt 98.4 kg (217 lb)   SpO2 97%   BMI 37.25 kg/m²    Physical Exam  HENT:      Head: Normocephalic and atraumatic.      Mouth/Throat:      Mouth: Mucous membranes are moist.      Pharynx: Oropharynx is clear. No posterior oropharyngeal erythema.   Eyes:      Extraocular Movements: Extraocular movements intact.      Conjunctiva/sclera: Conjunctivae normal.      Pupils: Pupils are equal, round, and reactive to light.   Cardiovascular:      Rate and Rhythm: Normal rate and regular rhythm.      Heart sounds: No murmur heard.  Pulmonary:      Comments: Decreased breath sounds at the bases, otherwise clear  Abdominal:      General: Bowel sounds are normal. There is no distension.      Palpations: Abdomen is soft. There is no mass.      Tenderness: There is no abdominal tenderness.      Comments: Obese, No splenomegaly   Musculoskeletal:         General: No swelling or tenderness. Normal range of motion.      Cervical back: Normal range of motion and neck supple. No tenderness.   Lymphadenopathy:      Cervical: No cervical adenopathy.   Skin:     General: Skin is warm and dry.      Findings: No rash.   Neurological:      General: No focal deficit present.      Mental Status: He is alert and " oriented to person, place, and time.      Cranial Nerves: No cranial nerve deficit.      Motor: No weakness.        LABORATORY  Recent Results (from the past 168 hour(s))   CBC with Differential    Collection Time: 02/20/23 10:11 AM   Result Value Ref Range    WBC 5.0 4.5 - 11.5 x10(3)/mcL    RBC 3.83 (L) 4.70 - 6.10 x10(6)/mcL    Hgb 11.6 (L) 14.0 - 18.0 g/dL    Hct 37.5 (L) 42.0 - 52.0 %    MCV 97.9 (H) 80.0 - 94.0 fL    MCH 30.3 pg    MCHC 30.9 (L) 33.0 - 36.0 g/dL    RDW 13.9 11.5 - 17.0 %    Platelet 408 (H) 130 - 400 x10(3)/mcL    MPV 8.7 7.4 - 10.4 fL    Neut % 64.9 %    Lymph % 19.0 %    Mono % 11.9 %    Eos % 2.6 %    Basophil % 0.8 %    Lymph # 0.94 0.6 - 4.6 x10(3)/mcL    Neut # 3.21 2.1 - 9.2 x10(3)/mcL    Mono # 0.59 0.1 - 1.3 x10(3)/mcL    Eos # 0.13 0 - 0.9 x10(3)/mcL    Baso # 0.04 0 - 0.2 x10(3)/mcL    IG# 0.04 0 - 0.04 x10(3)/mcL    IG% 0.8 %          Assessment:   ELLIS-2+ Polcythemia vera  Recurrent iron deficiency anemia      Plan:   WBC and platelet counts stable on current dose of Hydrea.  No significant change in H&H following IV iron 10/22.  Ferritin level pending.  If he remains iron deficient, will need further evaluation for his bleeding hemorrhoids.  RTC in 4 months for a follow-up visit with repeat laboratory.      ISRA KOHLER MD    Other Physicians  Dr. Nick Suazo    ADDENDUM 3/17/23  Serum ferritin level was only 22 ng/mL despite IV iron replacement 10/22.  Results were reviewed with the patient by phone.  GI consulted and patient underwent hemorrhoidal banding procedure this week.  He is scheduled for another procedure next week.  After his 2nd procedure, we will re-treat with IV Injectafer x 1 dose.  I will have him return 8 weeks after the iron for a follow-up visit with repeat laboratory.   THERESE

## 2023-02-20 ENCOUNTER — OFFICE VISIT (OUTPATIENT)
Dept: HEMATOLOGY/ONCOLOGY | Facility: CLINIC | Age: 68
End: 2023-02-20
Payer: MEDICARE

## 2023-02-20 ENCOUNTER — TELEPHONE (OUTPATIENT)
Dept: HEMATOLOGY/ONCOLOGY | Facility: CLINIC | Age: 68
End: 2023-02-20

## 2023-02-20 VITALS
OXYGEN SATURATION: 97 % | HEIGHT: 64 IN | RESPIRATION RATE: 18 BRPM | TEMPERATURE: 97 F | HEART RATE: 93 BPM | DIASTOLIC BLOOD PRESSURE: 91 MMHG | SYSTOLIC BLOOD PRESSURE: 177 MMHG | WEIGHT: 217 LBS | BODY MASS INDEX: 37.05 KG/M2

## 2023-02-20 DIAGNOSIS — D45 POLYCYTHEMIA VERA: Primary | ICD-10-CM

## 2023-02-20 DIAGNOSIS — E61.1 IRON DEFICIENCY: ICD-10-CM

## 2023-02-20 PROCEDURE — 99999 PR PBB SHADOW E&M-EST. PATIENT-LVL III: CPT | Mod: PBBFAC,,, | Performed by: INTERNAL MEDICINE

## 2023-02-20 PROCEDURE — 99213 OFFICE O/P EST LOW 20 MIN: CPT | Mod: PBBFAC | Performed by: INTERNAL MEDICINE

## 2023-02-20 PROCEDURE — 99213 OFFICE O/P EST LOW 20 MIN: CPT | Mod: S$PBB,,, | Performed by: INTERNAL MEDICINE

## 2023-02-20 PROCEDURE — 99213 PR OFFICE/OUTPT VISIT, EST, LEVL III, 20-29 MIN: ICD-10-PCS | Mod: S$PBB,,, | Performed by: INTERNAL MEDICINE

## 2023-02-20 PROCEDURE — 99999 PR PBB SHADOW E&M-EST. PATIENT-LVL III: ICD-10-PCS | Mod: PBBFAC,,, | Performed by: INTERNAL MEDICINE

## 2023-03-03 ENCOUNTER — TELEPHONE (OUTPATIENT)
Dept: HEMATOLOGY/ONCOLOGY | Facility: CLINIC | Age: 68
End: 2023-03-03
Payer: MEDICARE

## 2023-03-03 NOTE — TELEPHONE ENCOUNTER
I passed this message on to our , Nick, at Dr. Sanders's request and she has called patient. I left message for wife to let her know per Dr. Sanders the Hydrea is not the cause of patient symptoms and to call me if she has any other questions.

## 2023-03-03 NOTE — TELEPHONE ENCOUNTER
"Patient's wife, Althea, called to report patient "is not acting right." She reports he has outbursts and poor memory and she is calling without his knowledge because she will not discuss his condition with him because she is afraid of his reaction to her. She said their daughter, who works as a respiratory therapist across the street, and her son have both mentioned that they have noticed his decline and they have asked her to call to report this for about 3 months now. Wife is asking if the Hydrea could be causing this change in the way he is acting. She asked that I return her call directly to her cell phone because he will get angry with her if he found out she called us. Her contact number is [phone].  "

## 2023-03-06 ENCOUNTER — DOCUMENTATION ONLY (OUTPATIENT)
Dept: HEMATOLOGY/ONCOLOGY | Facility: CLINIC | Age: 68
End: 2023-03-06
Payer: MEDICARE

## 2023-03-06 NOTE — NURSING
I contacted Althea, patient's wife, as requested by Dr. Sanders's team to address behavioral concerns Althea is reporting. After talking with Althea I suggested a safety plan which included where to go and who to call if she feels unsafe or threatened. She stated this behavior has been going on for 2-3 years but has recently gotten worse. She is looking for guidance to make sure Richard' cancer isn't getting worse or the medication is not causing his outbursts. After our conversation I brought her concerns back to Dr. Sanders's team. She stated she will contact his PCP.

## 2023-03-17 RX ORDER — DIPHENHYDRAMINE HYDROCHLORIDE 50 MG/ML
50 INJECTION INTRAMUSCULAR; INTRAVENOUS ONCE AS NEEDED
Status: CANCELLED | OUTPATIENT
Start: 2023-03-28

## 2023-03-17 RX ORDER — METHYLPREDNISOLONE SOD SUCC 125 MG
125 VIAL (EA) INJECTION ONCE AS NEEDED
Status: CANCELLED | OUTPATIENT
Start: 2023-03-28

## 2023-03-17 RX ORDER — EPINEPHRINE 0.3 MG/.3ML
0.3 INJECTION SUBCUTANEOUS ONCE AS NEEDED
Status: CANCELLED | OUTPATIENT
Start: 2023-03-28

## 2023-03-27 RX ORDER — EPINEPHRINE 0.3 MG/.3ML
0.3 INJECTION SUBCUTANEOUS ONCE AS NEEDED
Status: CANCELLED | OUTPATIENT
Start: 2023-03-28

## 2023-03-27 RX ORDER — DIPHENHYDRAMINE HYDROCHLORIDE 50 MG/ML
50 INJECTION INTRAMUSCULAR; INTRAVENOUS ONCE AS NEEDED
Status: CANCELLED | OUTPATIENT
Start: 2023-03-28

## 2023-03-27 RX ORDER — METHYLPREDNISOLONE SOD SUCC 125 MG
125 VIAL (EA) INJECTION ONCE AS NEEDED
Status: CANCELLED | OUTPATIENT
Start: 2023-03-28

## 2023-03-27 RX ORDER — HEPARIN 100 UNIT/ML
5 SYRINGE INTRAVENOUS
Status: CANCELLED | OUTPATIENT
Start: 2023-03-28

## 2023-03-27 RX ORDER — SODIUM CHLORIDE 0.9 % (FLUSH) 0.9 %
10 SYRINGE (ML) INJECTION
Status: CANCELLED | OUTPATIENT
Start: 2023-03-28

## 2023-03-27 RX ORDER — SODIUM CHLORIDE 9 MG/ML
INJECTION, SOLUTION INTRAVENOUS CONTINUOUS
Status: CANCELLED | OUTPATIENT
Start: 2023-03-28

## 2023-03-28 ENCOUNTER — INFUSION (OUTPATIENT)
Dept: INFUSION THERAPY | Facility: HOSPITAL | Age: 68
End: 2023-03-28
Attending: INTERNAL MEDICINE
Payer: MEDICARE

## 2023-03-28 ENCOUNTER — DOCUMENTATION ONLY (OUTPATIENT)
Dept: HEMATOLOGY/ONCOLOGY | Facility: CLINIC | Age: 68
End: 2023-03-28
Payer: MEDICARE

## 2023-03-28 VITALS
HEART RATE: 93 BPM | HEIGHT: 64 IN | SYSTOLIC BLOOD PRESSURE: 134 MMHG | DIASTOLIC BLOOD PRESSURE: 82 MMHG | BODY MASS INDEX: 37.05 KG/M2 | OXYGEN SATURATION: 95 % | WEIGHT: 217 LBS | TEMPERATURE: 98 F

## 2023-03-28 DIAGNOSIS — D50.0 IRON DEFICIENCY ANEMIA DUE TO CHRONIC BLOOD LOSS: Primary | ICD-10-CM

## 2023-03-28 PROCEDURE — 25000003 PHARM REV CODE 250: Performed by: INTERNAL MEDICINE

## 2023-03-28 PROCEDURE — 96365 THER/PROPH/DIAG IV INF INIT: CPT

## 2023-03-28 PROCEDURE — 63600175 PHARM REV CODE 636 W HCPCS: Mod: JZ,JG | Performed by: INTERNAL MEDICINE

## 2023-03-28 RX ORDER — SODIUM CHLORIDE 0.9 % (FLUSH) 0.9 %
10 SYRINGE (ML) INJECTION
Status: DISCONTINUED | OUTPATIENT
Start: 2023-03-28 | End: 2023-03-28 | Stop reason: HOSPADM

## 2023-03-28 RX ORDER — HEPARIN 100 UNIT/ML
5 SYRINGE INTRAVENOUS
OUTPATIENT
Start: 2023-04-04

## 2023-03-28 RX ORDER — HEPARIN 100 UNIT/ML
5 SYRINGE INTRAVENOUS
Status: DISCONTINUED | OUTPATIENT
Start: 2023-03-28 | End: 2023-03-28 | Stop reason: HOSPADM

## 2023-03-28 RX ORDER — DIPHENHYDRAMINE HYDROCHLORIDE 50 MG/ML
50 INJECTION INTRAMUSCULAR; INTRAVENOUS ONCE AS NEEDED
OUTPATIENT
Start: 2023-04-04

## 2023-03-28 RX ORDER — SODIUM CHLORIDE 0.9 % (FLUSH) 0.9 %
10 SYRINGE (ML) INJECTION
OUTPATIENT
Start: 2023-04-04

## 2023-03-28 RX ORDER — SODIUM CHLORIDE 9 MG/ML
INJECTION, SOLUTION INTRAVENOUS CONTINUOUS
Status: DISCONTINUED | OUTPATIENT
Start: 2023-03-28 | End: 2023-03-28 | Stop reason: HOSPADM

## 2023-03-28 RX ORDER — EPINEPHRINE 0.3 MG/.3ML
0.3 INJECTION SUBCUTANEOUS ONCE AS NEEDED
OUTPATIENT
Start: 2023-04-04

## 2023-03-28 RX ORDER — METHYLPREDNISOLONE SOD SUCC 125 MG
125 VIAL (EA) INJECTION ONCE AS NEEDED
OUTPATIENT
Start: 2023-04-04

## 2023-03-28 RX ORDER — SODIUM CHLORIDE 9 MG/ML
INJECTION, SOLUTION INTRAVENOUS CONTINUOUS
OUTPATIENT
Start: 2023-04-04

## 2023-03-28 RX ADMIN — FERRIC CARBOXYMALTOSE INJECTION 750 MG: 50 INJECTION, SOLUTION INTRAVENOUS at 02:03

## 2023-03-28 RX ADMIN — SODIUM CHLORIDE: 9 INJECTION, SOLUTION INTRAVENOUS at 02:03

## 2023-03-28 NOTE — PLAN OF CARE
Injectafer given.  Tolerated infusion well.  Pt and wife visited with Nick Garza.  Discharged to home..

## 2023-03-28 NOTE — NURSING
"Pt here for one dose of Injectafer.  I received a secure chat from Vida Hoffman that pt was unstable and did not want a wheelchair. I went to lobby to get him.  He was not yet "arrived".. I waited and walked him to infusion area.  He seemed restless playing with phone.  I asked Nick Garza to come see if she could talk to him  she came, Mr. Ramos kept saying he was ok. He did say he was confused, dizzy this am upon waking and walked into a door. The wife says he has been acting strange at home. Nick did suggest to the wife to bring him to the ED.  Pt discharged to home.  "

## 2023-03-28 NOTE — NURSING
I met with Richard in infusion as requested by his nurse Lucretia. She reported that he was acting agitated during the time he was here. He reported that he was off balance this morning and has not been able to get his balance all day. He was here getting iron infusion. Possibly he would feel better during the week as his body absorbs the iron. We discussed a plan if his balance becomes worse or his agitation gets worse. He stated he smokes one cigarette every 9 days. He smoked his cigarette on Sunday, drank a few beers yesterday and woke up today feeling bad. We discussed not drinking or smoking for a while especially if his iron is low and he is short of breath. He agreed to that plan but he did not agree to going to the ER if his symptoms get worse. His wife expressed understanding of the plan.

## 2023-04-06 RX ORDER — HYDROXYUREA 500 MG/1
1000 CAPSULE ORAL
COMMUNITY
Start: 2022-03-07 | End: 2023-04-18 | Stop reason: SDUPTHER

## 2023-04-06 NOTE — H&P (VIEW-ONLY)
History & Physical    CHIEF COMPLAINT:  UMBILICAL HERNIA     History of Present Illness:  67 year-old-male referred by Dr. Suazo for a large umbilical hernia. The patient reports an umbilical bulge which has steadily increased in size over the past several years.  He denies any nausea, vomiting, constipation or obstructive symptoms.  No prior history of umbilical surgery.  He was a smoker but has cut down to 1 cigarette every 9 or 10 days and is looking to quit completely.      Review of patient's allergies indicates:  No Known Allergies    Current Outpatient Medications   Medication Sig Dispense Refill    hydroxyurea (HYDREA) 500 mg Cap Take 1,000 mg by mouth.      amLODIPine (NORVASC) 10 MG tablet Take 10 mg by mouth once daily.      aspirin (ECOTRIN) 81 MG EC tablet Take 81 mg by mouth once daily.      atorvastatin (LIPITOR) 40 MG tablet Take 40 mg by mouth once daily.      buPROPion (WELLBUTRIN XL) 300 MG 24 hr tablet Take 300 mg by mouth once daily.      hydroCHLOROthiazide (HYDRODIURIL) 25 MG tablet Take 25 mg by mouth once daily.      metoprolol tartrate (LOPRESSOR) 25 MG tablet Take 25 mg by mouth once daily.      trandolapriL (MAVIK) 4 MG Tab Take by mouth.       No current facility-administered medications for this visit.       Past Medical History:   Diagnosis Date    COPD (chronic obstructive pulmonary disease)     Emphysema, unspecified     Hyperlipidemia     Hypertension     Iron deficiency anemia     Polycythemia vera ELLIS-2+      Past Surgical History:   Procedure Laterality Date    APPENDECTOMY      BONE MARROW BIOPSY      COLONOSCOPY N/A     Excision of lipoma N/A     R foot    HEMORRHOID SURGERY      TONSILLECTOMY N/A      Family History   Problem Relation Age of Onset    Cancer Maternal Grandfather      Social History     Tobacco Use    Smoking status: Every Day     Packs/day: 0.50     Years: 40.00     Pack years: 20.00     Types: Cigarettes    Smokeless tobacco: Never   Substance Use Topics     Alcohol use: Yes        Review of Systems:  Review of Systems   Constitutional:  Negative for activity change, appetite change, chills, diaphoresis, fatigue and fever.   HENT:  Negative for congestion, ear pain, tinnitus and trouble swallowing.    Eyes:  Negative for photophobia and pain.   Respiratory:  Negative for apnea, cough, choking, chest tightness, shortness of breath and stridor.    Cardiovascular:  Negative for chest pain, palpitations and leg swelling.   Endocrine: Negative for cold intolerance and heat intolerance.   Genitourinary:  Negative for difficulty urinating, dysuria, enuresis, flank pain, frequency and hematuria.   Musculoskeletal:  Negative for arthralgias, back pain and gait problem.   Neurological:  Negative for dizziness, seizures, syncope, facial asymmetry, speech difficulty, weakness, light-headedness, numbness and headaches.   Psychiatric/Behavioral:  Negative for agitation, behavioral problems, confusion and decreased concentration.    All other systems reviewed and are negative.    OBJECTIVE:     Vital Signs (Most Recent)              Physical Exam:  Physical Exam  Constitutional:       General: He is not in acute distress.     Appearance: Normal appearance. He is well-developed and normal weight. He is not ill-appearing, toxic-appearing or diaphoretic.   HENT:      Head: Normocephalic and atraumatic.      Right Ear: Hearing and external ear normal.      Left Ear: Hearing and external ear normal.      Nose: No congestion or rhinorrhea.      Mouth/Throat:      Mouth: Mucous membranes are moist.      Pharynx: Oropharynx is clear. No oropharyngeal exudate.   Eyes:      General: Lids are normal. Gaze aligned appropriately. No scleral icterus.     Extraocular Movements: Extraocular movements intact.      Right eye: Normal extraocular motion and no nystagmus.      Left eye: Normal extraocular motion and no nystagmus.      Conjunctiva/sclera: Conjunctivae normal.      Right eye: Right  conjunctiva is not injected.      Left eye: Left conjunctiva is not injected.      Pupils: Pupils are equal, round, and reactive to light.   Neck:      Vascular: No carotid bruit or JVD.      Trachea: Trachea and phonation normal.   Cardiovascular:      Rate and Rhythm: Normal rate and regular rhythm.      Pulses: Normal pulses.      Heart sounds: Normal heart sounds. No murmur heard.    No friction rub. No gallop.   Pulmonary:      Effort: Pulmonary effort is normal. No tachypnea, accessory muscle usage, respiratory distress or retractions.      Breath sounds: Normal breath sounds and air entry. No stridor or decreased air movement. No wheezing or rhonchi.   Chest:      Chest wall: No mass, deformity, swelling, tenderness or crepitus.   Abdominal:      General: Abdomen is flat. Bowel sounds are normal. There is no distension. There are no signs of injury.      Palpations: Abdomen is soft. There is no shifting dullness, fluid wave, hepatomegaly, splenomegaly or mass.      Tenderness: There is no abdominal tenderness. There is no guarding or rebound.      Hernia: A hernia is present.   Musculoskeletal:         General: No swelling or tenderness.      Cervical back: Normal range of motion and neck supple. No rigidity, tenderness or crepitus.   Lymphadenopathy:      Head:      Right side of head: No submental, submandibular or occipital adenopathy.      Left side of head: No submental, submandibular or occipital adenopathy.      Cervical: No cervical adenopathy.      Right cervical: No superficial or posterior cervical adenopathy.     Left cervical: No superficial or posterior cervical adenopathy.      Upper Body:      Right upper body: No supraclavicular or axillary adenopathy.      Left upper body: No supraclavicular or axillary adenopathy.      Lower Body: No right inguinal adenopathy. No left inguinal adenopathy.   Skin:     General: Skin is warm.      Capillary Refill: Capillary refill takes less than 2 seconds.       Coloration: Skin is not cyanotic, jaundiced, mottled or pale.      Findings: No bruising, ecchymosis, erythema, lesion, petechiae or rash.      Nails: There is no clubbing.   Neurological:      General: No focal deficit present.      Mental Status: He is alert and oriented to person, place, and time.      Cranial Nerves: No cranial nerve deficit or facial asymmetry.      Sensory: No sensory deficit.      Motor: No weakness, tremor, atrophy or abnormal muscle tone.      Coordination: Coordination normal.      Gait: Gait normal.      Deep Tendon Reflexes: Reflexes normal.   Psychiatric:         Attention and Perception: Attention and perception normal.         Mood and Affect: Mood normal. Mood is not anxious or depressed. Affect is not blunt or flat.         Speech: Speech normal. Speech is not slurred.         Behavior: Behavior normal. Behavior is cooperative.         ASSESSMENT/PLAN:       Umbilical hernia, large 5 cm, with incarcerated adipose tissue       Schedule laparoscopic/robotic repair of incarcerated umbilical hernia with mesh    The risks and benefits of the procedure were explained in detail using layman terms, including the pros and cons of any implant that may be used, all questions were addressed, the patient gives consent to proceed

## 2023-04-11 ENCOUNTER — OFFICE VISIT (OUTPATIENT)
Dept: SURGICAL ONCOLOGY | Facility: CLINIC | Age: 68
End: 2023-04-11
Payer: MEDICARE

## 2023-04-11 VITALS
HEIGHT: 65 IN | BODY MASS INDEX: 36.39 KG/M2 | SYSTOLIC BLOOD PRESSURE: 148 MMHG | HEART RATE: 74 BPM | WEIGHT: 218.38 LBS | DIASTOLIC BLOOD PRESSURE: 76 MMHG

## 2023-04-11 DIAGNOSIS — K42.9 UMBILICAL HERNIA: ICD-10-CM

## 2023-04-11 PROCEDURE — 99203 OFFICE O/P NEW LOW 30 MIN: CPT | Mod: S$PBB,,, | Performed by: SURGERY

## 2023-04-11 PROCEDURE — 99999 PR PBB SHADOW E&M-EST. PATIENT-LVL III: CPT | Mod: PBBFAC,,, | Performed by: SURGERY

## 2023-04-11 PROCEDURE — 99213 OFFICE O/P EST LOW 20 MIN: CPT | Mod: PBBFAC | Performed by: SURGERY

## 2023-04-11 PROCEDURE — 99999 PR PBB SHADOW E&M-EST. PATIENT-LVL III: ICD-10-PCS | Mod: PBBFAC,,, | Performed by: SURGERY

## 2023-04-11 PROCEDURE — 99203 PR OFFICE/OUTPT VISIT, NEW, LEVL III, 30-44 MIN: ICD-10-PCS | Mod: S$PBB,,, | Performed by: SURGERY

## 2023-04-13 DIAGNOSIS — K42.9 UMBILICAL HERNIA: ICD-10-CM

## 2023-04-13 DIAGNOSIS — K42.9 UMBILICAL HERNIA WITHOUT OBSTRUCTION AND WITHOUT GANGRENE: Primary | ICD-10-CM

## 2023-04-13 DIAGNOSIS — Z01.818 PREOP TESTING: ICD-10-CM

## 2023-04-13 RX ORDER — ENOXAPARIN SODIUM 100 MG/ML
30 INJECTION SUBCUTANEOUS EVERY 24 HOURS
Status: CANCELLED | OUTPATIENT
Start: 2023-04-13

## 2023-04-18 ENCOUNTER — TELEPHONE (OUTPATIENT)
Dept: HEMATOLOGY/ONCOLOGY | Facility: CLINIC | Age: 68
End: 2023-04-18
Payer: MEDICARE

## 2023-04-18 DIAGNOSIS — E61.1 IRON DEFICIENCY: ICD-10-CM

## 2023-04-18 DIAGNOSIS — D45 POLYCYTHEMIA VERA: Primary | ICD-10-CM

## 2023-04-18 RX ORDER — HYDROXYUREA 500 MG/1
1000 CAPSULE ORAL 2 TIMES DAILY
Qty: 60 CAPSULE | Refills: 0 | Status: SHIPPED | OUTPATIENT
Start: 2023-04-18 | End: 2023-05-22 | Stop reason: SDUPTHER

## 2023-04-19 ENCOUNTER — TELEPHONE (OUTPATIENT)
Dept: HEMATOLOGY/ONCOLOGY | Facility: CLINIC | Age: 68
End: 2023-04-19
Payer: MEDICARE

## 2023-04-19 NOTE — TELEPHONE ENCOUNTER
Pharmacy needs clarification on hydrea rx that was sent in  yesterday. It is written for 2 tabs bid, but a note to the pharmacy states to take 1 tab bid. Patient told pharmacy that he is taking 2 tabs once daily. Can you confirm dosing for me?

## 2023-04-27 DIAGNOSIS — R06.02 SHORTNESS OF BREATH: ICD-10-CM

## 2023-04-27 DIAGNOSIS — J43.9 EMPHYSEMA OF LUNG: Primary | ICD-10-CM

## 2023-05-01 ENCOUNTER — HOSPITAL ENCOUNTER (OUTPATIENT)
Dept: RADIOLOGY | Facility: HOSPITAL | Age: 68
Discharge: HOME | End: 2023-05-01
Attending: SURGERY
Payer: MEDICARE

## 2023-05-01 DIAGNOSIS — K42.9 UMBILICAL HERNIA WITHOUT OBSTRUCTION AND WITHOUT GANGRENE: ICD-10-CM

## 2023-05-01 DIAGNOSIS — Z01.818 PREOP TESTING: ICD-10-CM

## 2023-05-01 PROCEDURE — 71045 X-RAY EXAM CHEST 1 VIEW: CPT | Mod: TC

## 2023-05-03 ENCOUNTER — ANESTHESIA EVENT (OUTPATIENT)
Dept: SURGERY | Facility: HOSPITAL | Age: 68
End: 2023-05-03
Payer: MEDICARE

## 2023-05-03 ENCOUNTER — PROCEDURE VISIT (OUTPATIENT)
Dept: RESPIRATORY THERAPY | Facility: HOSPITAL | Age: 68
End: 2023-05-03
Attending: FAMILY MEDICINE
Payer: MEDICARE

## 2023-05-03 VITALS — RESPIRATION RATE: 16 BRPM | HEART RATE: 65 BPM | OXYGEN SATURATION: 97 %

## 2023-05-03 DIAGNOSIS — R06.02 SHORTNESS OF BREATH: ICD-10-CM

## 2023-05-03 DIAGNOSIS — J43.9 EMPHYSEMA OF LUNG: Primary | ICD-10-CM

## 2023-05-03 PROCEDURE — 94729 DIFFUSING CAPACITY: CPT

## 2023-05-03 PROCEDURE — 94727 GAS DIL/WSHOT DETER LNG VOL: CPT

## 2023-05-03 PROCEDURE — 94761 N-INVAS EAR/PLS OXIMETRY MLT: CPT

## 2023-05-03 PROCEDURE — 25000242 PHARM REV CODE 250 ALT 637 W/ HCPCS: Performed by: INTERNAL MEDICINE

## 2023-05-03 PROCEDURE — 94060 EVALUATION OF WHEEZING: CPT

## 2023-05-03 RX ORDER — ALBUTEROL SULFATE 0.83 MG/ML
2.5 SOLUTION RESPIRATORY (INHALATION)
Status: DISCONTINUED | OUTPATIENT
Start: 2023-05-03 | End: 2023-05-03 | Stop reason: HOSPADM

## 2023-05-03 RX ADMIN — ALBUTEROL SULFATE 2.5 MG: 0.83 SOLUTION RESPIRATORY (INHALATION) at 07:05

## 2023-05-04 ENCOUNTER — ANESTHESIA (OUTPATIENT)
Dept: SURGERY | Facility: HOSPITAL | Age: 68
End: 2023-05-04
Payer: MEDICARE

## 2023-05-04 ENCOUNTER — HOSPITAL ENCOUNTER (OUTPATIENT)
Facility: HOSPITAL | Age: 68
Discharge: HOME | End: 2023-05-04
Attending: SURGERY | Admitting: SURGERY
Payer: MEDICARE

## 2023-05-04 VITALS
TEMPERATURE: 98 F | HEIGHT: 64 IN | HEART RATE: 75 BPM | WEIGHT: 213.63 LBS | DIASTOLIC BLOOD PRESSURE: 88 MMHG | BODY MASS INDEX: 36.47 KG/M2 | RESPIRATION RATE: 20 BRPM | SYSTOLIC BLOOD PRESSURE: 147 MMHG | OXYGEN SATURATION: 92 %

## 2023-05-04 DIAGNOSIS — K42.9 UMBILICAL HERNIA WITHOUT OBSTRUCTION AND WITHOUT GANGRENE: ICD-10-CM

## 2023-05-04 DIAGNOSIS — K42.9 UMBILICAL HERNIA: ICD-10-CM

## 2023-05-04 PROCEDURE — 25000003 PHARM REV CODE 250: Performed by: NURSE ANESTHETIST, CERTIFIED REGISTERED

## 2023-05-04 PROCEDURE — D9220A PRA ANESTHESIA: ICD-10-PCS | Mod: ANES,,, | Performed by: ANESTHESIOLOGY

## 2023-05-04 PROCEDURE — C9290 INJ, BUPIVACAINE LIPOSOME: HCPCS | Performed by: SURGERY

## 2023-05-04 PROCEDURE — D9220A PRA ANESTHESIA: ICD-10-PCS | Mod: CRNA,,, | Performed by: NURSE ANESTHETIST, CERTIFIED REGISTERED

## 2023-05-04 PROCEDURE — 25000003 PHARM REV CODE 250: Performed by: SURGERY

## 2023-05-04 PROCEDURE — 71000016 HC POSTOP RECOV ADDL HR: Performed by: SURGERY

## 2023-05-04 PROCEDURE — 63600175 PHARM REV CODE 636 W HCPCS: Performed by: SURGERY

## 2023-05-04 PROCEDURE — D9220A PRA ANESTHESIA: Mod: CRNA,,, | Performed by: NURSE ANESTHETIST, CERTIFIED REGISTERED

## 2023-05-04 PROCEDURE — 36000711: Performed by: SURGERY

## 2023-05-04 PROCEDURE — 37000008 HC ANESTHESIA 1ST 15 MINUTES: Performed by: SURGERY

## 2023-05-04 PROCEDURE — 49594 PR REPAIR ANT ABD HERNIA INITIAL 3-10 CM INCARC/STRANG: ICD-10-PCS | Mod: ,,, | Performed by: SURGERY

## 2023-05-04 PROCEDURE — 36000710: Performed by: SURGERY

## 2023-05-04 PROCEDURE — 63600175 PHARM REV CODE 636 W HCPCS: Performed by: NURSE ANESTHETIST, CERTIFIED REGISTERED

## 2023-05-04 PROCEDURE — D9220A PRA ANESTHESIA: Mod: ANES,,, | Performed by: ANESTHESIOLOGY

## 2023-05-04 PROCEDURE — 71000033 HC RECOVERY, INTIAL HOUR: Performed by: SURGERY

## 2023-05-04 PROCEDURE — 49594 RPR AA HRN 1ST 3-10 NCR/STRN: CPT | Mod: ,,, | Performed by: SURGERY

## 2023-05-04 PROCEDURE — 63600175 PHARM REV CODE 636 W HCPCS: Performed by: ANESTHESIOLOGY

## 2023-05-04 PROCEDURE — 37000009 HC ANESTHESIA EA ADD 15 MINS: Performed by: SURGERY

## 2023-05-04 PROCEDURE — C1781 MESH (IMPLANTABLE): HCPCS | Performed by: SURGERY

## 2023-05-04 PROCEDURE — 71000015 HC POSTOP RECOV 1ST HR: Performed by: SURGERY

## 2023-05-04 PROCEDURE — A4216 STERILE WATER/SALINE, 10 ML: HCPCS | Performed by: SURGERY

## 2023-05-04 PROCEDURE — 27201423 OPTIME MED/SURG SUP & DEVICES STERILE SUPPLY: Performed by: SURGERY

## 2023-05-04 DEVICE — MESH HERN SYNECOR 12CM CIRCLE: Type: IMPLANTABLE DEVICE | Site: UMBILICAL | Status: FUNCTIONAL

## 2023-05-04 RX ORDER — DEXAMETHASONE SODIUM PHOSPHATE 4 MG/ML
INJECTION, SOLUTION INTRA-ARTICULAR; INTRALESIONAL; INTRAMUSCULAR; INTRAVENOUS; SOFT TISSUE
Status: DISCONTINUED | OUTPATIENT
Start: 2023-05-04 | End: 2023-05-04

## 2023-05-04 RX ORDER — SODIUM CHLORIDE 0.9 % (FLUSH) 0.9 %
SYRINGE (ML) INJECTION
Status: DISCONTINUED | OUTPATIENT
Start: 2023-05-04 | End: 2023-05-04 | Stop reason: HOSPADM

## 2023-05-04 RX ORDER — ENOXAPARIN SODIUM 100 MG/ML
30 INJECTION SUBCUTANEOUS EVERY 24 HOURS
Status: DISCONTINUED | OUTPATIENT
Start: 2023-05-04 | End: 2023-05-04 | Stop reason: HOSPADM

## 2023-05-04 RX ORDER — ONDANSETRON 2 MG/ML
4 INJECTION INTRAMUSCULAR; INTRAVENOUS ONCE AS NEEDED
Status: DISCONTINUED | OUTPATIENT
Start: 2023-05-04 | End: 2023-05-04 | Stop reason: HOSPADM

## 2023-05-04 RX ORDER — LIDOCAINE HYDROCHLORIDE 20 MG/ML
INJECTION, SOLUTION EPIDURAL; INFILTRATION; INTRACAUDAL; PERINEURAL
Status: DISCONTINUED | OUTPATIENT
Start: 2023-05-04 | End: 2023-05-04

## 2023-05-04 RX ORDER — HYDROCODONE BITARTRATE AND ACETAMINOPHEN 7.5; 325 MG/1; MG/1
1 TABLET ORAL ONCE
Status: COMPLETED | OUTPATIENT
Start: 2023-05-04 | End: 2023-05-04

## 2023-05-04 RX ORDER — HYDROCODONE BITARTRATE AND ACETAMINOPHEN 7.5; 325 MG/1; MG/1
1 TABLET ORAL EVERY 6 HOURS PRN
Qty: 10 TABLET | Refills: 0 | Status: ON HOLD | OUTPATIENT
Start: 2023-05-04 | End: 2023-11-09 | Stop reason: HOSPADM

## 2023-05-04 RX ORDER — FENTANYL CITRATE 50 UG/ML
INJECTION, SOLUTION INTRAMUSCULAR; INTRAVENOUS
Status: DISCONTINUED | OUTPATIENT
Start: 2023-05-04 | End: 2023-05-04

## 2023-05-04 RX ORDER — ONDANSETRON HYDROCHLORIDE 2 MG/ML
INJECTION, SOLUTION INTRAMUSCULAR; INTRAVENOUS
Status: DISCONTINUED | OUTPATIENT
Start: 2023-05-04 | End: 2023-05-04

## 2023-05-04 RX ORDER — MEPERIDINE HYDROCHLORIDE 25 MG/ML
12.5 INJECTION INTRAMUSCULAR; INTRAVENOUS; SUBCUTANEOUS EVERY 10 MIN PRN
Status: DISCONTINUED | OUTPATIENT
Start: 2023-05-04 | End: 2023-05-04 | Stop reason: HOSPADM

## 2023-05-04 RX ORDER — ROCURONIUM BROMIDE 10 MG/ML
INJECTION, SOLUTION INTRAVENOUS
Status: DISCONTINUED | OUTPATIENT
Start: 2023-05-04 | End: 2023-05-04

## 2023-05-04 RX ORDER — PHENYLEPHRINE HCL IN 0.9% NACL 1 MG/10 ML
SYRINGE (ML) INTRAVENOUS
Status: DISCONTINUED | OUTPATIENT
Start: 2023-05-04 | End: 2023-05-04

## 2023-05-04 RX ORDER — IPRATROPIUM BROMIDE AND ALBUTEROL SULFATE 2.5; .5 MG/3ML; MG/3ML
3 SOLUTION RESPIRATORY (INHALATION) ONCE AS NEEDED
Status: DISCONTINUED | OUTPATIENT
Start: 2023-05-04 | End: 2023-05-04 | Stop reason: HOSPADM

## 2023-05-04 RX ORDER — KETOROLAC TROMETHAMINE 30 MG/ML
15 INJECTION, SOLUTION INTRAMUSCULAR; INTRAVENOUS ONCE
Status: COMPLETED | OUTPATIENT
Start: 2023-05-04 | End: 2023-05-04

## 2023-05-04 RX ORDER — DIPHENHYDRAMINE HYDROCHLORIDE 50 MG/ML
25 INJECTION INTRAMUSCULAR; INTRAVENOUS ONCE AS NEEDED
Status: DISCONTINUED | OUTPATIENT
Start: 2023-05-04 | End: 2023-05-04 | Stop reason: HOSPADM

## 2023-05-04 RX ORDER — ENOXAPARIN SODIUM 100 MG/ML
INJECTION SUBCUTANEOUS
Status: DISCONTINUED
Start: 2023-05-04 | End: 2023-05-04 | Stop reason: HOSPADM

## 2023-05-04 RX ORDER — BUPIVACAINE HYDROCHLORIDE 5 MG/ML
INJECTION, SOLUTION EPIDURAL; INTRACAUDAL
Status: DISCONTINUED | OUTPATIENT
Start: 2023-05-04 | End: 2023-05-04 | Stop reason: HOSPADM

## 2023-05-04 RX ORDER — HYDROMORPHONE HYDROCHLORIDE 2 MG/ML
0.5 INJECTION, SOLUTION INTRAMUSCULAR; INTRAVENOUS; SUBCUTANEOUS EVERY 5 MIN PRN
Status: DISCONTINUED | OUTPATIENT
Start: 2023-05-04 | End: 2023-05-04 | Stop reason: HOSPADM

## 2023-05-04 RX ORDER — ACETAMINOPHEN 10 MG/ML
1000 INJECTION, SOLUTION INTRAVENOUS ONCE
Status: COMPLETED | OUTPATIENT
Start: 2023-05-04 | End: 2023-05-04

## 2023-05-04 RX ORDER — CEFAZOLIN SODIUM 2 G/50ML
2 SOLUTION INTRAVENOUS
Status: COMPLETED | OUTPATIENT
Start: 2023-05-04 | End: 2023-05-04

## 2023-05-04 RX ORDER — MIDAZOLAM HYDROCHLORIDE 1 MG/ML
INJECTION INTRAMUSCULAR; INTRAVENOUS
Status: DISCONTINUED | OUTPATIENT
Start: 2023-05-04 | End: 2023-05-04

## 2023-05-04 RX ORDER — PROPOFOL 10 MG/ML
VIAL (ML) INTRAVENOUS
Status: DISCONTINUED | OUTPATIENT
Start: 2023-05-04 | End: 2023-05-04

## 2023-05-04 RX ADMIN — LIDOCAINE HYDROCHLORIDE 80 MG: 20 INJECTION, SOLUTION EPIDURAL; INFILTRATION; INTRACAUDAL; PERINEURAL at 10:05

## 2023-05-04 RX ADMIN — SODIUM CHLORIDE, SODIUM GLUCONATE, SODIUM ACETATE, POTASSIUM CHLORIDE AND MAGNESIUM CHLORIDE: 526; 502; 368; 37; 30 INJECTION, SOLUTION INTRAVENOUS at 10:05

## 2023-05-04 RX ADMIN — ACETAMINOPHEN 1000 MG: 10 INJECTION, SOLUTION INTRAVENOUS at 12:05

## 2023-05-04 RX ADMIN — FENTANYL CITRATE 100 MCG: 50 INJECTION, SOLUTION INTRAMUSCULAR; INTRAVENOUS at 10:05

## 2023-05-04 RX ADMIN — MIDAZOLAM HYDROCHLORIDE 2 MG: 1 INJECTION, SOLUTION INTRAMUSCULAR; INTRAVENOUS at 10:05

## 2023-05-04 RX ADMIN — HYDROMORPHONE HYDROCHLORIDE 2 MG: 2 INJECTION, SOLUTION INTRAMUSCULAR; INTRAVENOUS; SUBCUTANEOUS at 12:05

## 2023-05-04 RX ADMIN — Medication 100 MCG: at 10:05

## 2023-05-04 RX ADMIN — CEFAZOLIN SODIUM 2 G: 2 SOLUTION INTRAVENOUS at 10:05

## 2023-05-04 RX ADMIN — PROPOFOL 150 MG: 10 INJECTION, EMULSION INTRAVENOUS at 10:05

## 2023-05-04 RX ADMIN — ROCURONIUM BROMIDE 50 MG: 10 SOLUTION INTRAVENOUS at 10:05

## 2023-05-04 RX ADMIN — KETOROLAC TROMETHAMINE 15 MG: 30 INJECTION, SOLUTION INTRAMUSCULAR; INTRAVENOUS at 12:05

## 2023-05-04 RX ADMIN — ONDANSETRON HYDROCHLORIDE 4 MG: 2 INJECTION, SOLUTION INTRAMUSCULAR; INTRAVENOUS at 10:05

## 2023-05-04 RX ADMIN — DEXAMETHASONE SODIUM PHOSPHATE 4 MG: 4 INJECTION, SOLUTION INTRA-ARTICULAR; INTRALESIONAL; INTRAMUSCULAR; INTRAVENOUS; SOFT TISSUE at 10:05

## 2023-05-04 RX ADMIN — SUGAMMADEX 200 MG: 100 INJECTION, SOLUTION INTRAVENOUS at 11:05

## 2023-05-04 RX ADMIN — HYDROCODONE BITARTRATE AND ACETAMINOPHEN 1 TABLET: 7.5; 325 TABLET ORAL at 01:05

## 2023-05-04 NOTE — TRANSFER OF CARE
"Anesthesia Transfer of Care Note    Patient: Richard Healy    Procedure(s) Performed: Procedure(s) (LRB):  ROBOTIC REPAIR, HERNIA, UMBILICAL (N/A)    Patient location: PACU    Anesthesia Type: general    Transport from OR: Transported from OR on room air with adequate spontaneous ventilation    Post pain: adequate analgesia    Post assessment: no apparent anesthetic complications    Post vital signs: stable    Level of consciousness: awake    Complications: none    Transfer of care protocol was followed      Last vitals:   Visit Vitals  BP (!) 151/89   Pulse 80   Temp 36.9 °C (98.5 °F) (Oral)   Resp (!) 21   Ht 5' 4" (1.626 m)   Wt 96.9 kg (213 lb 10 oz)   SpO2 (!) 93%   BMI 36.67 kg/m²     "

## 2023-05-04 NOTE — OP NOTE
Date:  05/04/2023    Surgeon:  Vishal Chen MD    Assistant:  None    Preoperative Diagnosis: Umbilical hernia, nonrecurrent incarcerated 3 cm    Postoperative Diagnosis:  Same    Procedure: Laparoscopic/robotic non recurrent incarcerated 3 cm umbilical hernia repair with mesh    Anesthesia: GETA    Estimated Blood Loss: 15cc           Intraoperative findings: Umbilical hernia defect closed primarily and reinforced with underlay mesh reinforcement using a circular 7cm Greer IP permanent mesh    Procedure Details: After informed consent was obtained the patient was brought to the operating room placed in the supine position.  General endotracheal anesthesia was administered without difficulty.  The patient's abdomen was prepped and draped in sterile fashion.  After infiltration with local anesthesia right upper quadrant incision was made and an optical trocar was used to enter the peritoneal cavity under direct vision.  Pneumoperitoneum was achieved without difficulty.  Additional robotic ports were placed in the right lateral abdomen under direct vision.  The patient was tilted towards the left.  The hernia defect was identified, it measured approximately 3 cm in maximum diameter.  The contents were reduced along with the hernia sac.  The preperitoneal fat and falciform ligament were taken down exposing the posterior fascia.  Insufflation was decreased to 8 mmHg.  The hernia defect was closed primarily using running 0 V-lock suture.  Using a ruler internally, I measured for a 3-4 cm over lap of the fascial closure, a 7 cm circular Greer IP mesh was then selected and inserted into the abdominal cavity after being soaked in antimicrobial solution.  It was suspended to the repair using 3-0 absorbable V-lock.  It was then secured circumferentially using running 0 V-lock suture, with care to avoid any folding or redundancy of the mesh.  The absorbable suture was then used to secure the interior of the mesh to the  posterior abdominal wall eliminating the dead space.  The suture lines were hemostatic.  The mesh was in excellent position.  Liposomal bupivacaine was then injected around the suture line in the preperitoneal position under direct vision.  Ports were removed, pneumoperitoneum was relieved, port sites were closed with absorbable suture and sterile dressings were applied.  The patient tolerated the procedure well.    Brief Discharge Note:    Outcome: Satisfactory  Disposition: Discharged home postoperatively in good condition  Followup: 2wks  Final Diagnosis same as postoperative diagnosis      Vishal Chen MD  Surgical Oncology  Complex General, Gastrointestinal and Hepatobiliary Surgery

## 2023-05-04 NOTE — ANESTHESIA PREPROCEDURE EVALUATION
05/04/2023  Richard Healy is a 67 y.o., male who presents with Umbilical Hernia for repair.      The pt. comes to [facility] for the noted procedure under GA/LMA vs GETA.    PMHx:  Other Medical History   Polycythemia vera ELLIS-2+ Iron deficiency anemia   Hypertension Hyperlipidemia   COPD (chronic obstructive pulmonary disease) Emphysema, unspecified     Surgical History    TONSILLECTOMY APPENDECTOMY   BONE MARROW BIOPSY HEMORRHOID SURGERY   COLONOSCOPY Excision of lipoma   MULTIPLE TOOTH EXTRACTIONS            Vital signs:        Lab Data:      EKG:          Pre-op Assessment    I have reviewed the Patient Summary Reports.     I have reviewed the Nursing Notes. I have reviewed the NPO Status.   I have reviewed the Medications.     Review of Systems  Anesthesia Hx:  No problems with previous Anesthesia    Social:  Non-Smoker    Hematology/Oncology:  Hematology Normal   Oncology Normal     EENT/Dental:EENT/Dental Normal   Cardiovascular:   Exercise tolerance: good Hypertension  Functional Capacity good / => 4 METS    Pulmonary:   COPD    Renal/:  Renal/ Normal     Hepatic/GI:  Hepatic/GI Normal    Musculoskeletal:  Musculoskeletal Normal    Neurological:  Neurology Normal    Endocrine:  Endocrine Normal    Dermatological:  Skin Normal    Psych:  Psychiatric Normal           Physical Exam  General: Alert, Oriented, Well nourished and Cooperative    Airway:  Mallampati: II   Mouth Opening: Normal  TM Distance: Normal  Tongue: Normal  Neck ROM: Normal ROM    Dental:  Intact    Chest/Lungs:  Clear to auscultation, Normal Respiratory Rate    Heart:  Rate: Normal  Rhythm: Regular Rhythm        Anesthesia Plan  Type of Anesthesia, risks & benefits discussed:    Anesthesia Type: Gen ETT  Intra-op Monitoring Plan: Standard ASA Monitors  Post Op Pain Control Plan: IV/PO Opioids PRN  Induction:  IV and  Inhalation  Airway Plan: Direct  Informed Consent: Informed consent signed with the Patient and all parties understand the risks and agree with anesthesia plan.  All questions answered. Patient consented to blood products? Yes  ASA Score: 2  Day of Surgery Review of History & Physical: H&P Update referred to the surgeon/provider.    Ready For Surgery From Anesthesia Perspective.     .

## 2023-05-05 ENCOUNTER — TELEPHONE (OUTPATIENT)
Dept: SURGICAL ONCOLOGY | Facility: CLINIC | Age: 68
End: 2023-05-05
Payer: MEDICARE

## 2023-05-05 NOTE — TELEPHONE ENCOUNTER
Patient was prescribed Norco 7.5-325 yesterday take one tablet every 6 hours for pain. Patient states that it does not last the full 6 hours. Patient was told by Irene MORTON that he would have prescription for pain would be prescribed every 4 hours for pain, before he was discharged. Pain scale 10/10 when the medicine starts to wear off.

## 2023-05-05 NOTE — TELEPHONE ENCOUNTER
Patient was prescribed Norco 7.5-325 yesterday take one tablet every 6 hours for pain. Patient states that it does not last the full 6 hours. Patient was told by Irene MORTON that he would have prescription for pain would be prescribed every 4 hours for pain, before he was discharged. Pain scale 10/10 when the medicine starts to wear off.       0840:  Spoke with patient, advised he take Norco every 4 hours with Ibuprofen in between if needed.  LILIANE

## 2023-05-08 NOTE — ANESTHESIA POSTPROCEDURE EVALUATION
Anesthesia Post Evaluation    Patient: Richard Healy    Procedure(s) Performed: Procedure(s) (LRB):  ROBOTIC REPAIR, HERNIA, UMBILICAL (N/A)    Final Anesthesia Type: general      Patient location during evaluation: PACU  Patient participation: Yes- Able to Participate  Level of consciousness: awake and alert and oriented  Post-procedure vital signs: reviewed and stable  Pain management: adequate  Airway patency: patent  LUZ mitigation strategies: Verification of full reversal of neuromuscular block  PONV status at discharge: No PONV  Anesthetic complications: no      Cardiovascular status: blood pressure returned to baseline and stable  Respiratory status: spontaneous ventilation and unassisted  Hydration status: euvolemic  Follow-up not needed.  Comments: [facility]          Vitals Value Taken Time   /88 05/04/23 1308   Temp 36.4 °C (97.6 °F) 05/04/23 1240   Pulse 75 05/04/23 1308   Resp 20 05/04/23 1338   SpO2 92 % 05/04/23 1308         Event Time   Out of Recovery 12:31:14         Pain/Kacey Score: No data recorded

## 2023-05-16 ENCOUNTER — OFFICE VISIT (OUTPATIENT)
Dept: SURGICAL ONCOLOGY | Facility: CLINIC | Age: 68
End: 2023-05-16
Payer: MEDICARE

## 2023-05-16 DIAGNOSIS — K42.9 UMBILICAL HERNIA WITHOUT OBSTRUCTION AND WITHOUT GANGRENE: Primary | ICD-10-CM

## 2023-05-16 PROCEDURE — 99024 POSTOP FOLLOW-UP VISIT: CPT | Mod: POP,,, | Performed by: SURGERY

## 2023-05-16 PROCEDURE — 99024 PR POST-OP FOLLOW-UP VISIT: ICD-10-PCS | Mod: POP,,, | Performed by: SURGERY

## 2023-05-16 NOTE — PROGRESS NOTES
Patient returns for postoperative visit after undergoing laparoscopic/robotic repair of incarcerated umbilical hernia with mesh.  He reports no significant problems, no significant pain nausea or vomiting    On exam he appears well in no apparent distress  Abdomen is soft nontender nondistended, laparoscopic port sites are well healed, hernia site is healing normally    Activity restrictions were again discussed and compliance strongly encouraged  Return to clinic as needed

## 2023-05-22 DIAGNOSIS — D45 POLYCYTHEMIA VERA: ICD-10-CM

## 2023-05-22 DIAGNOSIS — E61.1 IRON DEFICIENCY: ICD-10-CM

## 2023-05-22 RX ORDER — HYDROXYUREA 500 MG/1
500 CAPSULE ORAL 2 TIMES DAILY
Qty: 60 CAPSULE | Refills: 3 | Status: SHIPPED | OUTPATIENT
Start: 2023-05-22 | End: 2023-09-19 | Stop reason: DRUGHIGH

## 2023-05-23 NOTE — PROGRESS NOTES
Subjective:       Patient ID: Richard Healy is a 67 y.o. male.    Chief Complaint:  I feel okay    Diagnosis: ELLIS-2+ Polycythemia vera                    Recurrent iron deficiency anemia                     + COVID-19 vaccinated     Treatment History  Feraheme 3/21  Injectafer 4/22, 10/22, 3/23    Current Treatment: Hydrea 1000 mg daily; ASA 81 mg daily    Clinical History:  Patient was initially referred to Dr. Christensen in [place] in 2017 for an abnormal CBC. Platelet count was 739 and hemoglobin level was 18.6. He was started on Hydrea pending further workup. PCR for BCR-ABL was negative. Bone marrow 5/11/17 was hypercellular 70% with trilineage hematopoiesis and no significant dysplasia. Iron stores were absent. There was no significant fibrosis. 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 Following Dr. Christensen's [facility], care was transferred to Dr. Taylor. He had never undergone therapeutic phlebotomy. Beginning in July and September 2020, he began developing mild anemia with microcytic, hypochromic red cell indices. He also had progressive thrombocytosis. Iron profile 10/29/20 showed evidence of iron deficiency anemia with a serum ferritin level of 8.9. He was treated with an oral iron supplement but had progressive anemia with associated fatigue and progressive thrombocytopenia. He also had symptoms of pica for Cherry tomatoes and cucumbers. Follow-up testing 2/12/21 showed a hemoglobin of 11.1 and hematocrit 38.2. WBC was 9.2 and platelet count had increased to 739. Serum iron was 20, TIBC 488, saturation 4% and ferritin level 5.6 ng/mL. B12 and folic acid levels were normal. He was treated with IV Feraheme 510 mg x 2 doses 3/21 with symptomatic improvement.    He transferred his care to [facility] 3/10/21 due to the relocation of Dr. Taylor. Counts improved following IV iron therapy.  Hydrea was resumed 4/1/21 at dose of 500 mg alternating with 1000 mg  daily.  He developed recurrent iron deficiency anemia with a hemoglobin of 11.4 and serum ferritin level 14.6 ng/mL.  His WBC and platelet counts were normal on Hydrea.  He had recurrent PICA symptoms for cherry tomatoes.  Was treated with a single dose of Injectafer 750 mg on 04/11/22.  His energy level improved following treatment.    EGD and colonoscopy 2022 was remarkable only for internal hemorrhoids.  Required re-treatment with IV Injectafer x 2 doses 10/22 for recurrent iron deficiency anemia with a hemoglobin of 11.8 and serum ferritin level of 17 ng/mL.  He had mild PICA symptoms for cherry tomatoes and cucumbers.    Interval History  Mr. Healy presents to the clinic by himself for his 3 month follow up visit to recheck blood counts. Patient states that he is taking his Hydrea as directed with no issues. Patient required IV Injectafer x 1 dose 3/23 for recurrent iron deficiency anemia secondary to bleeding hemorrhoids. The Injectafer was well tolerated with no appreciable side effects.  He reports having a recent hemorrhoidal banding x3 and denies recurrent bleeding. He denies PICA symptoms and fatigue. Laboratory drawn today shows resolution of his microcytic anemia following IV iron replacement.  Ferritin level is normal at 48. Results discussed with patient.    Review of Systems   Constitutional:  Negative for activity change, fatigue, fever and unexpected weight change.   HENT:  Negative for nasal congestion, hearing loss, mouth sores, sore throat and trouble swallowing.    Eyes: Negative.    Respiratory:  Negative for cough, shortness of breath and wheezing.    Cardiovascular:  Negative for chest pain, palpitations and leg swelling.   Gastrointestinal:  Negative for abdominal distention, abdominal pain, blood in stool, constipation, diarrhea and nausea.   Endocrine: Negative.    Genitourinary:  Negative for dysuria, frequency, hematuria and urgency.   Musculoskeletal:  Negative for arthralgias, back  "pain and neck pain (Chronic).   Integumentary:  Negative for rash and mole/lesion.        No pruritus   Allergic/Immunologic: Negative.    Neurological:  Negative for dizziness, weakness, numbness and headaches.   Hematological:  Negative for adenopathy. Does not bruise/bleed easily.   Psychiatric/Behavioral: Negative.         PMHx:  HTN, hyperlipidemia, COPD  PSHx:  Tonsils, appendectomy, hemorrhoids, R foot lipoma, bone marrow, colonoscopy, umbilical hernia repair 5/23, hemorrhoidal banding 3/23  SH:  + Tobacco 1.5 PPD > 40 years. + Social alcohol use. 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  FH:  Maternal grandfather had cancer of unknown type. No family history of blood disorders.     Objective:        BP (!) 159/95   Pulse 81   Temp 98.5 °F (36.9 °C)   Resp 16   Ht 5' 4" (1.626 m)   Wt 97.7 kg (215 lb 4.8 oz)   SpO2 97%   BMI 36.96 kg/m²    Physical Exam  HENT:      Head: Normocephalic and atraumatic.      Mouth/Throat:      Mouth: Mucous membranes are moist.      Pharynx: Oropharynx is clear. No posterior oropharyngeal erythema.   Eyes:      Extraocular Movements: Extraocular movements intact.      Conjunctiva/sclera: Conjunctivae normal.      Pupils: Pupils are equal, round, and reactive to light.   Cardiovascular:      Rate and Rhythm: Normal rate and regular rhythm.      Heart sounds: No murmur heard.  Abdominal:      General: Bowel sounds are normal. There is no distension.      Palpations: Abdomen is soft. There is no mass.      Tenderness: There is no abdominal tenderness.      Comments: Obese, No splenomegaly   Musculoskeletal:         General: No swelling or tenderness. Normal range of motion.      Cervical back: Normal range of motion and neck supple. No tenderness.   Lymphadenopathy:      Cervical: No cervical adenopathy.   Skin:     General: Skin is warm and dry.      Findings: No rash.   Neurological:      General: No focal deficit present.      Mental Status: He is " alert and oriented to person, place, and time.      Cranial Nerves: No cranial nerve deficit.      Motor: No weakness.        LABORATORY  Recent Results (from the past 168 hour(s))   CBC with Differential    Collection Time: 05/24/23  9:17 AM   Result Value Ref Range    WBC 4.97 4.50 - 11.50 x10(3)/mcL    RBC 4.98 4.70 - 6.10 x10(6)/mcL    Hgb 14.1 14.0 - 18.0 g/dL    Hct 45.9 42.0 - 52.0 %    MCV 92.2 80.0 - 94.0 fL    MCH 28.3 27.0 - 31.0 pg    MCHC 30.7 (L) 33.0 - 36.0 g/dL    RDW 22.0 (H) 11.5 - 17.0 %    Platelet 474 (H) 130 - 400 x10(3)/mcL    MPV 9.2 7.4 - 10.4 fL    Neut % 68.8 %    Lymph % 17.1 %    Mono % 9.5 %    Eos % 3.4 %    Basophil % 0.6 %    Lymph # 0.85 0.6 - 4.6 x10(3)/mcL    Neut # 3.42 2.1 - 9.2 x10(3)/mcL    Mono # 0.47 0.1 - 1.3 x10(3)/mcL    Eos # 0.17 0 - 0.9 x10(3)/mcL    Baso # 0.03 <=0.2 x10(3)/mcL    IG# 0.03 0 - 0.04 x10(3)/mcL    IG% 0.6 %            Assessment:   ELLIS-2+ Polcythemia vera  Recurrent iron deficiency anemia      Plan:   Continue Hydrea 1000mg daily. Prescription refills sent to pharmacy.  Anemia has resolved following IV iron replacement.  Ferritin level is normal.    Follow up with GI if rectal bleeding recurs.    RTC in 3 months for a follow-up visit with repeat laboratory, including CBC and ferritin level.  All questions answered.    GORDON DELGADO,FNP-C    Other Physicians  Dr. Nick Suazo

## 2023-05-24 ENCOUNTER — LAB VISIT (OUTPATIENT)
Dept: LAB | Facility: HOSPITAL | Age: 68
End: 2023-05-24
Attending: FAMILY MEDICINE
Payer: MEDICARE

## 2023-05-24 ENCOUNTER — OFFICE VISIT (OUTPATIENT)
Dept: HEMATOLOGY/ONCOLOGY | Facility: CLINIC | Age: 68
End: 2023-05-24
Payer: MEDICARE

## 2023-05-24 VITALS
BODY MASS INDEX: 36.76 KG/M2 | SYSTOLIC BLOOD PRESSURE: 159 MMHG | OXYGEN SATURATION: 97 % | WEIGHT: 215.31 LBS | RESPIRATION RATE: 16 BRPM | HEIGHT: 64 IN | HEART RATE: 81 BPM | TEMPERATURE: 99 F | DIASTOLIC BLOOD PRESSURE: 95 MMHG

## 2023-05-24 DIAGNOSIS — D45 POLYCYTHEMIA VERA: Primary | ICD-10-CM

## 2023-05-24 DIAGNOSIS — D50.9 IRON DEFICIENCY ANEMIA, UNSPECIFIED IRON DEFICIENCY ANEMIA TYPE: ICD-10-CM

## 2023-05-24 DIAGNOSIS — D45 POLYCYTHEMIA VERA: ICD-10-CM

## 2023-05-24 DIAGNOSIS — E61.1 IRON DEFICIENCY: ICD-10-CM

## 2023-05-24 LAB
BASOPHILS # BLD AUTO: 0.03 X10(3)/MCL
BASOPHILS NFR BLD AUTO: 0.6 %
EOSINOPHIL # BLD AUTO: 0.17 X10(3)/MCL (ref 0–0.9)
EOSINOPHIL NFR BLD AUTO: 3.4 %
ERYTHROCYTE [DISTWIDTH] IN BLOOD BY AUTOMATED COUNT: 22 % (ref 11.5–17)
FERRITIN SERPL-MCNC: 48.71 NG/ML (ref 21.81–274.66)
HCT VFR BLD AUTO: 45.9 % (ref 42–52)
HGB BLD-MCNC: 14.1 G/DL (ref 14–18)
IMM GRANULOCYTES # BLD AUTO: 0.03 X10(3)/MCL (ref 0–0.04)
IMM GRANULOCYTES NFR BLD AUTO: 0.6 %
LYMPHOCYTES # BLD AUTO: 0.85 X10(3)/MCL (ref 0.6–4.6)
LYMPHOCYTES NFR BLD AUTO: 17.1 %
MCH RBC QN AUTO: 28.3 PG (ref 27–31)
MCHC RBC AUTO-ENTMCNC: 30.7 G/DL (ref 33–36)
MCV RBC AUTO: 92.2 FL (ref 80–94)
MONOCYTES # BLD AUTO: 0.47 X10(3)/MCL (ref 0.1–1.3)
MONOCYTES NFR BLD AUTO: 9.5 %
NEUTROPHILS # BLD AUTO: 3.42 X10(3)/MCL (ref 2.1–9.2)
NEUTROPHILS NFR BLD AUTO: 68.8 %
PLATELET # BLD AUTO: 474 X10(3)/MCL (ref 130–400)
PMV BLD AUTO: 9.2 FL (ref 7.4–10.4)
RBC # BLD AUTO: 4.98 X10(6)/MCL (ref 4.7–6.1)
WBC # SPEC AUTO: 4.97 X10(3)/MCL (ref 4.5–11.5)

## 2023-05-24 PROCEDURE — 99213 OFFICE O/P EST LOW 20 MIN: CPT | Mod: PBBFAC | Performed by: NURSE PRACTITIONER

## 2023-05-24 PROCEDURE — 82728 ASSAY OF FERRITIN: CPT

## 2023-05-24 PROCEDURE — 36415 COLL VENOUS BLD VENIPUNCTURE: CPT

## 2023-05-24 PROCEDURE — 85025 COMPLETE CBC W/AUTO DIFF WBC: CPT

## 2023-05-24 PROCEDURE — 99214 PR OFFICE/OUTPT VISIT, EST, LEVL IV, 30-39 MIN: ICD-10-PCS | Mod: S$PBB,,, | Performed by: NURSE PRACTITIONER

## 2023-05-24 PROCEDURE — 99999 PR PBB SHADOW E&M-EST. PATIENT-LVL III: CPT | Mod: PBBFAC,,, | Performed by: NURSE PRACTITIONER

## 2023-05-24 PROCEDURE — 99214 OFFICE O/P EST MOD 30 MIN: CPT | Mod: S$PBB,,, | Performed by: NURSE PRACTITIONER

## 2023-05-24 PROCEDURE — 99999 PR PBB SHADOW E&M-EST. PATIENT-LVL III: ICD-10-PCS | Mod: PBBFAC,,, | Performed by: NURSE PRACTITIONER

## 2023-05-24 RX ORDER — HYDROXYUREA 500 MG/1
1000 CAPSULE ORAL DAILY
Qty: 60 CAPSULE | Refills: 11 | Status: SHIPPED | OUTPATIENT
Start: 2023-05-24 | End: 2023-09-19 | Stop reason: SDUPTHER

## 2023-08-16 DIAGNOSIS — E27.8 RIGHT ADRENAL MASS: Primary | ICD-10-CM

## 2023-09-19 DIAGNOSIS — D45 POLYCYTHEMIA VERA: ICD-10-CM

## 2023-09-19 RX ORDER — HYDROXYUREA 500 MG/1
1000 CAPSULE ORAL DAILY
Qty: 60 CAPSULE | Refills: 5 | Status: SHIPPED | OUTPATIENT
Start: 2023-09-19 | End: 2024-03-19 | Stop reason: SDUPTHER

## 2023-09-20 ENCOUNTER — LAB VISIT (OUTPATIENT)
Dept: LAB | Facility: HOSPITAL | Age: 68
End: 2023-09-20
Attending: INTERNAL MEDICINE
Payer: MEDICARE

## 2023-09-20 DIAGNOSIS — D50.9 IRON DEFICIENCY ANEMIA, UNSPECIFIED IRON DEFICIENCY ANEMIA TYPE: ICD-10-CM

## 2023-09-20 DIAGNOSIS — D45 POLYCYTHEMIA VERA: ICD-10-CM

## 2023-09-20 LAB
BASOPHILS # BLD AUTO: 0.03 X10(3)/MCL
BASOPHILS NFR BLD AUTO: 0.5 %
EOSINOPHIL # BLD AUTO: 0.08 X10(3)/MCL (ref 0–0.9)
EOSINOPHIL NFR BLD AUTO: 1.5 %
ERYTHROCYTE [DISTWIDTH] IN BLOOD BY AUTOMATED COUNT: 15.3 % (ref 11.5–17)
FERRITIN SERPL-MCNC: 77.22 NG/ML (ref 21.81–274.66)
HCT VFR BLD AUTO: 41.8 % (ref 42–52)
HGB BLD-MCNC: 13.5 G/DL (ref 14–18)
IMM GRANULOCYTES # BLD AUTO: 0.01 X10(3)/MCL (ref 0–0.04)
IMM GRANULOCYTES NFR BLD AUTO: 0.2 %
LYMPHOCYTES # BLD AUTO: 0.7 X10(3)/MCL (ref 0.6–4.6)
LYMPHOCYTES NFR BLD AUTO: 12.7 %
MCH RBC QN AUTO: 31.7 PG (ref 27–31)
MCHC RBC AUTO-ENTMCNC: 32.3 G/DL (ref 33–36)
MCV RBC AUTO: 98.1 FL (ref 80–94)
MONOCYTES # BLD AUTO: 0.46 X10(3)/MCL (ref 0.1–1.3)
MONOCYTES NFR BLD AUTO: 8.4 %
NEUTROPHILS # BLD AUTO: 4.22 X10(3)/MCL (ref 2.1–9.2)
NEUTROPHILS NFR BLD AUTO: 76.7 %
PLATELET # BLD AUTO: 438 X10(3)/MCL (ref 130–400)
PMV BLD AUTO: 8.5 FL (ref 7.4–10.4)
RBC # BLD AUTO: 4.26 X10(6)/MCL (ref 4.7–6.1)
WBC # SPEC AUTO: 5.5 X10(3)/MCL (ref 4.5–11.5)

## 2023-09-20 PROCEDURE — 85025 COMPLETE CBC W/AUTO DIFF WBC: CPT

## 2023-09-20 PROCEDURE — 36415 COLL VENOUS BLD VENIPUNCTURE: CPT

## 2023-09-20 PROCEDURE — 82728 ASSAY OF FERRITIN: CPT

## 2023-09-21 NOTE — PROGRESS NOTES
Subjective:       Patient ID: Richard Healy is a 67 y.o. male.    Chief Complaint:  Left leg pain on walking    Diagnosis: ELLIS-2+ Polycythemia vera                    Recurrent iron deficiency anemia     Treatment History  Feraheme 3/21  Injectafer 4/22, 10/22, 3/23    Current Treatment: Hydrea 1000 mg daily; ASA 81 mg daily    Clinical History:  Patient was initially referred to Dr. Christensen in [place] in 2017 for an abnormal CBC. Platelet count was 739 and hemoglobin level was 18.6. He was started on Hydrea pending further workup. PCR for BCR-ABL was negative. Bone marrow 5/11/17 was hypercellular 70% with trilineage hematopoiesis and no significant dysplasia. Iron stores were absent. There was no significant fibrosis. 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 Following Dr. Christensen's halfway, care was transferred to Dr. Taylor. He had never undergone therapeutic phlebotomy. Beginning in July and September 2020, he began developing mild anemia with microcytic, hypochromic red cell indices. He also had progressive thrombocytosis. Iron profile 10/29/20 showed evidence of iron deficiency anemia with a serum ferritin level of 8.9. He was treated with an oral iron supplement but had progressive anemia with associated fatigue and progressive thrombocytopenia. He also had symptoms of pica for Cherry tomatoes and cucumbers. Follow-up testing 2/12/21 showed a hemoglobin of 11.1 and hematocrit 38.2. WBC was 9.2 and platelet count had increased to 739. Serum iron was 20, TIBC 488, saturation 4% and ferritin level 5.6 ng/mL. B12 and folic acid levels were normal. He was treated with IV Feraheme 510 mg x 2 doses 3/21 with symptomatic improvement.    He transferred his care to [facility] 3/10/21 due to the relocation of Dr. Taylor. Counts improved following IV iron therapy.  Hydrea was resumed 4/1/21 at dose of 500 mg alternating with 1000 mg daily.  He developed recurrent  iron deficiency anemia with a hemoglobin of 11.4 and serum ferritin level 14.6 ng/mL.  His WBC and platelet counts were normal on Hydrea.  He had recurrent PICA symptoms for cherry tomatoes.  Was treated with a single dose of Injectafer 750 mg on 04/11/22.  His energy level improved following treatment.    EGD and colonoscopy 2022 was remarkable only for internal hemorrhoids.  Required re-treatment with IV Injectafer x 2 doses 10/22 for recurrent iron deficiency anemia with a hemoglobin of 11.8 and serum ferritin level of 17 ng/mL.  He had mild PICA symptoms for cherry tomatoes and cucumbers.  He was treated with IV Injectafer 323 with improvement in his blood counts.  His bleeding stopped after he underwent hemorrhoidal banding.    Interval History  He returns to the office today by himself for a three-month follow-up visit to recheck his blood counts.  He underwent a workup by Cardiology due to lower extremity claudication symptoms, particularly on left side.  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  He is scheduled for stent placement on the left side 10/18/23.  He has an appointment to see Surgical Oncology for a right adrenal lesion which has been present on multiple previous scans.  He has no abdominal symptoms or complaints.  No changes in his bowel habits, no melena or hematochezia.  Laboratory shows no evidence of recurrent anemia.  Serum ferritin level is 77 ng/mL.  His blood counts are well controlled on his current dose of Hydrea.    Review of Systems   Constitutional:  Negative for activity change, fatigue, fever and unexpected weight change.   HENT:  Negative for nasal congestion, hearing loss, mouth sores, sore throat and trouble swallowing.    Eyes: Negative.    Respiratory:  Negative for cough, shortness of breath and wheezing.    Cardiovascular:   "Positive for claudication. Negative for chest pain, palpitations and leg swelling.   Gastrointestinal:  Negative for abdominal distention, abdominal pain, constipation, diarrhea and nausea.   Endocrine: Negative.    Genitourinary:  Negative for dysuria, frequency, hematuria and urgency.   Musculoskeletal:  Positive for neck pain (Chronic). Negative for arthralgias and back pain.   Integumentary:  Negative for rash and mole/lesion.        No pruritus   Allergic/Immunologic: Negative.    Neurological:  Negative for dizziness, weakness, numbness and headaches.   Hematological:  Negative for adenopathy. Does not bruise/bleed easily.   Psychiatric/Behavioral: Negative.         PMHx:  HTN, hyperlipidemia, COPD  PSHx:  Tonsils, appendectomy, hemorrhoids, R foot lipoma, bone marrow, colonoscopy, umbilical hernia repair 5/23, hemorrhoidal banding 3/23  SH:  + Tobacco 1.5 PPD > 40 years. + Social alcohol use. 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  FH:  Maternal grandfather had cancer of unknown type. No family history of blood disorders.     Objective:        /84   Pulse 98   Temp 98.3 °F (36.8 °C)   Resp 16   Ht 5' 4" (1.626 m)   Wt 89.6 kg (197 lb 8 oz)   SpO2 97%   BMI 33.90 kg/m²    Physical Exam  Constitutional:       Comments: Mildly obese white male in NAD   HENT:      Head: Normocephalic.      Mouth/Throat:      Mouth: Mucous membranes are moist.      Pharynx: Oropharynx is clear. No posterior oropharyngeal erythema.   Eyes:      Extraocular Movements: Extraocular movements intact.      Conjunctiva/sclera: Conjunctivae normal.      Pupils: Pupils are equal, round, and reactive to light.   Cardiovascular:      Rate and Rhythm: Normal rate and regular rhythm.      Heart sounds: No murmur heard.  Abdominal:      General: Bowel sounds are normal. There is no distension.      Palpations: Abdomen is soft. There is no mass.      Tenderness: There is no abdominal tenderness.      " Comments: Obese, No splenomegaly   Musculoskeletal:         General: No swelling or tenderness. Normal range of motion.      Cervical back: Neck supple. No tenderness.   Skin:     General: Skin is warm and dry.      Findings: No rash.   Neurological:      General: No focal deficit present.      Mental Status: He is alert and oriented to person, place, and time.      Cranial Nerves: No cranial nerve deficit.      Motor: No weakness.          LABORATORY  Recent Results (from the past 168 hour(s))   Ferritin    Collection Time: 09/20/23  2:40 PM   Result Value Ref Range    Ferritin Level 77.22 21.81 - 274.66 ng/mL   CBC with Differential    Collection Time: 09/20/23  2:40 PM   Result Value Ref Range    WBC 5.50 4.50 - 11.50 x10(3)/mcL    RBC 4.26 (L) 4.70 - 6.10 x10(6)/mcL    Hgb 13.5 (L) 14.0 - 18.0 g/dL    Hct 41.8 (L) 42.0 - 52.0 %    MCV 98.1 (H) 80.0 - 94.0 fL    MCH 31.7 (H) 27.0 - 31.0 pg    MCHC 32.3 (L) 33.0 - 36.0 g/dL    RDW 15.3 11.5 - 17.0 %    Platelet 438 (H) 130 - 400 x10(3)/mcL    MPV 8.5 7.4 - 10.4 fL    Neut % 76.7 %    Lymph % 12.7 %    Mono % 8.4 %    Eos % 1.5 %    Basophil % 0.5 %    Lymph # 0.70 0.6 - 4.6 x10(3)/mcL    Neut # 4.22 2.1 - 9.2 x10(3)/mcL    Mono # 0.46 0.1 - 1.3 x10(3)/mcL    Eos # 0.08 0 - 0.9 x10(3)/mcL    Baso # 0.03 <=0.2 x10(3)/mcL    IG# 0.01 0 - 0.04 x10(3)/mcL    IG% 0.2 %        Assessment:   ELLIS-2+ Polcythemia vera  Recurrent iron deficiency anemia      Plan:   Continue Hydrea 1000mg daily.  CBC at goal.  Continue aspirin 81 mg daily for thrombosis prophylaxis.  RTC in 4 months for a follow-up visit with repeat laboratory.      ISRA KOHLER MD    Other Physicians  Dr. Nick Garcia

## 2023-09-26 ENCOUNTER — OFFICE VISIT (OUTPATIENT)
Dept: HEMATOLOGY/ONCOLOGY | Facility: CLINIC | Age: 68
End: 2023-09-26
Payer: MEDICARE

## 2023-09-26 VITALS
SYSTOLIC BLOOD PRESSURE: 125 MMHG | OXYGEN SATURATION: 97 % | BODY MASS INDEX: 33.72 KG/M2 | HEIGHT: 64 IN | RESPIRATION RATE: 16 BRPM | DIASTOLIC BLOOD PRESSURE: 84 MMHG | HEART RATE: 98 BPM | WEIGHT: 197.5 LBS | TEMPERATURE: 98 F

## 2023-09-26 DIAGNOSIS — D45 POLYCYTHEMIA VERA: Primary | ICD-10-CM

## 2023-09-26 PROCEDURE — 99999 PR PBB SHADOW E&M-EST. PATIENT-LVL III: CPT | Mod: PBBFAC,,, | Performed by: INTERNAL MEDICINE

## 2023-09-26 PROCEDURE — 99213 OFFICE O/P EST LOW 20 MIN: CPT | Mod: PBBFAC | Performed by: INTERNAL MEDICINE

## 2023-09-26 PROCEDURE — 99213 OFFICE O/P EST LOW 20 MIN: CPT | Mod: S$PBB,,, | Performed by: INTERNAL MEDICINE

## 2023-09-26 PROCEDURE — 99999 PR PBB SHADOW E&M-EST. PATIENT-LVL III: ICD-10-PCS | Mod: PBBFAC,,, | Performed by: INTERNAL MEDICINE

## 2023-09-26 PROCEDURE — 99213 PR OFFICE/OUTPT VISIT, EST, LEVL III, 20-29 MIN: ICD-10-PCS | Mod: S$PBB,,, | Performed by: INTERNAL MEDICINE

## 2023-09-26 RX ORDER — BUDESONIDE, GLYCOPYRROLATE, AND FORMOTEROL FUMARATE 160; 9; 4.8 UG/1; UG/1; UG/1
2 AEROSOL, METERED RESPIRATORY (INHALATION) 2 TIMES DAILY
COMMUNITY
Start: 2023-09-18

## 2023-09-27 ENCOUNTER — OFFICE VISIT (OUTPATIENT)
Dept: SURGICAL ONCOLOGY | Facility: CLINIC | Age: 68
End: 2023-09-27
Payer: MEDICARE

## 2023-09-27 ENCOUNTER — TELEPHONE (OUTPATIENT)
Dept: HEMATOLOGY/ONCOLOGY | Facility: CLINIC | Age: 68
End: 2023-09-27
Payer: MEDICARE

## 2023-09-27 VITALS
HEART RATE: 72 BPM | BODY MASS INDEX: 32.62 KG/M2 | SYSTOLIC BLOOD PRESSURE: 113 MMHG | HEIGHT: 65 IN | WEIGHT: 195.81 LBS | DIASTOLIC BLOOD PRESSURE: 75 MMHG

## 2023-09-27 DIAGNOSIS — E27.8 RIGHT ADRENAL MASS: ICD-10-CM

## 2023-09-27 PROCEDURE — 99999 PR PBB SHADOW E&M-EST. PATIENT-LVL III: CPT | Mod: PBBFAC,,, | Performed by: SURGERY

## 2023-09-27 PROCEDURE — 99999 PR PBB SHADOW E&M-EST. PATIENT-LVL III: ICD-10-PCS | Mod: PBBFAC,,, | Performed by: SURGERY

## 2023-09-27 PROCEDURE — 99215 PR OFFICE/OUTPT VISIT, EST, LEVL V, 40-54 MIN: ICD-10-PCS | Mod: S$PBB,,, | Performed by: SURGERY

## 2023-09-27 PROCEDURE — 99213 OFFICE O/P EST LOW 20 MIN: CPT | Mod: PBBFAC | Performed by: SURGERY

## 2023-09-27 PROCEDURE — 99215 OFFICE O/P EST HI 40 MIN: CPT | Mod: S$PBB,,, | Performed by: SURGERY

## 2023-09-27 RX ORDER — CILOSTAZOL 50 MG/1
50 TABLET ORAL 2 TIMES DAILY
COMMUNITY
Start: 2023-08-17

## 2023-09-27 NOTE — PROGRESS NOTES
Chief complaint:  Right adrenal mass, 5.3 cm     HPI:  67-year-old male presents with a right adrenal incidentaloma identified in 2022 when it measured 4 cm in size.  He demonstrates no evidence of functional symptoms.  Functional workup including catecholamines and cortisol levels were normal.  Follow-up imaging was performed and shows increase in size of the lesion, personally interpreted the images and report, now up to 5.3 cm in maximum diameter with heterogeneous appearance and indeterminate Hounsfield unit enhancement.  He denies chest pain, dyspnea on exertion, flushing, palpitations, headaches, uncontrolled blood pressure or anxiety    Greater than 40minutes was required for complete chart review, imaging review, medical decision making,  patient consultation, surgical scheduling/coordination and documentation        Past Medical and Surgical History  Allergies :   Patient has no known allergies.    @[facility]@  Medical :   He has a past medical history of COPD (chronic obstructive pulmonary disease), Emphysema, unspecified, Hyperlipidemia, Hypertension, Iron deficiency anemia, and Polycythemia vera ELLIS-2+.    Surgical :   He has a past surgical history that includes Tonsillectomy (N/A); Appendectomy; Bone marrow biopsy; Hemorrhoid surgery; Colonoscopy (N/A); Excision of lipoma (N/A); Multiple tooth extractions; and Robot-assisted laparoscopic repair of umbilical hernia (N/A, 5/4/2023).     Family History  His family history includes Cancer in his maternal grandfather; Heart failure in his father; Peripheral vascular disease in his mother.    Social History  He reports that he has been smoking cigarettes. He has never used smokeless tobacco. He reports current alcohol use of about 2.0 - 4.0 standard drinks of alcohol per week. He reports that he does not use drugs.     Review of Systems   Constitutional:  Negative for activity change, appetite change, chills, diaphoresis, fatigue and fever.   HENT:   Negative for congestion, ear pain, tinnitus and trouble swallowing.    Eyes:  Negative for photophobia and pain.   Respiratory:  Negative for apnea, cough, choking, chest tightness, shortness of breath and stridor.    Cardiovascular:  Negative for chest pain, palpitations and leg swelling.   Endocrine: Negative for cold intolerance and heat intolerance.   Genitourinary:  Negative for difficulty urinating, dysuria, enuresis, flank pain, frequency and hematuria.   Musculoskeletal:  Negative for arthralgias, back pain and gait problem.   Neurological:  Negative for dizziness, seizures, syncope, facial asymmetry, speech difficulty, weakness, light-headedness, numbness and headaches.   Psychiatric/Behavioral:  Negative for agitation, behavioral problems, confusion and decreased concentration.    All other systems reviewed and are negative.       Objective   Physical Exam  Constitutional:       General: He is not in acute distress.     Appearance: Normal appearance. He is not ill-appearing, toxic-appearing or diaphoretic.   HENT:      Head: Normocephalic and atraumatic.      Nose: No congestion or rhinorrhea.      Mouth/Throat:      Mouth: Mucous membranes are moist.      Pharynx: Oropharynx is clear.   Eyes:      General: No scleral icterus.     Extraocular Movements: Extraocular movements intact.      Pupils: Pupils are equal, round, and reactive to light.   Cardiovascular:      Rate and Rhythm: Normal rate and regular rhythm.      Pulses: Normal pulses.      Heart sounds: Normal heart sounds. No murmur heard.     No friction rub. No gallop.   Pulmonary:      Effort: Pulmonary effort is normal. No respiratory distress.      Breath sounds: Normal breath sounds. No stridor. No wheezing or rhonchi.   Chest:      Chest wall: No tenderness.   Abdominal:      General: Abdomen is flat. There is no distension.      Palpations: Abdomen is soft. There is no mass.      Tenderness: There is no abdominal tenderness. There is no  "guarding or rebound.      Hernia: No hernia is present.   Musculoskeletal:         General: No swelling, tenderness, deformity or signs of injury.      Cervical back: Normal range of motion and neck supple. No rigidity or tenderness.      Right lower leg: No edema.      Left lower leg: No edema.   Skin:     General: Skin is warm.      Capillary Refill: Capillary refill takes less than 2 seconds.      Coloration: Skin is not jaundiced or pale.      Findings: No bruising, erythema, lesion or rash.   Neurological:      General: No focal deficit present.      Mental Status: He is alert and oriented to person, place, and time.   Psychiatric:         Mood and Affect: Mood normal.         Behavior: Behavior normal.         Thought Content: Thought content normal.         Judgment: Judgment normal.       VITAL SIGNS: 24 HR MIN & MAX LAST    @FLOWSTAT(6:24::1)@           @FLOWSTAT(5:24::1)@  113/75     @FLOWSTAT(8:24::1)@  72     @FLOWSTAT(9:24::1)@       @FLOWSTAT(10:24::1)@         HT: 5' 5" (165.1 cm)  WT: 88.8 kg (195 lb 12.8 oz)  BMI: 32.6       Assessment & Plan     Right adrenal incidentaloma, indeterminate imaging characteristics, increase in size from 4 cm to 5.3 cm, nonfunctional based on clinical history and biochemical studies    Diagnosis, staging, prognosis and treatment guidelines for adrenal incidentaloma were discussed with the patient in detail, all questions were addressed.  I explained given the indeterminate imaging characteristics of his lesion as well as a significant increase in size over a relatively short period of time, malignancy can not be completely excluded.  I also explained the risk and potential complications of percutaneous biopsy of the adrenal gland.  Based on current guidelines, resection of this lesion is indicated for diagnostic and potentially therapeutic purposes.    Schedule laparoscopic/robotic right adrenalectomy    Preoperative cardiac evaluation/clearance    The risks and " benefits of the procedure were explained in detail using layman terms, including the pros and cons of any implant that may be used, all questions were addressed, the patient gives consent to proceed    Vishal Chen MD  Surgical Oncology  Complex General, Gastrointestinal and Hepatobiliary Surgery

## 2023-09-27 NOTE — H&P (VIEW-ONLY)
Chief complaint:  Right adrenal mass, 5.3 cm     HPI:  67-year-old male presents with a right adrenal incidentaloma identified in 2022 when it measured 4 cm in size.  He demonstrates no evidence of functional symptoms.  Functional workup including catecholamines and cortisol levels were normal.  Follow-up imaging was performed and shows increase in size of the lesion, personally interpreted the images and report, now up to 5.3 cm in maximum diameter with heterogeneous appearance and indeterminate Hounsfield unit enhancement.  He denies chest pain, dyspnea on exertion, flushing, palpitations, headaches, uncontrolled blood pressure or anxiety    Greater than 40minutes was required for complete chart review, imaging review, medical decision making,  patient consultation, surgical scheduling/coordination and documentation        Past Medical and Surgical History  Allergies :   Patient has no known allergies.    @[facility]@  Medical :   He has a past medical history of COPD (chronic obstructive pulmonary disease), Emphysema, unspecified, Hyperlipidemia, Hypertension, Iron deficiency anemia, and Polycythemia vera ELLIS-2+.    Surgical :   He has a past surgical history that includes Tonsillectomy (N/A); Appendectomy; Bone marrow biopsy; Hemorrhoid surgery; Colonoscopy (N/A); Excision of lipoma (N/A); Multiple tooth extractions; and Robot-assisted laparoscopic repair of umbilical hernia (N/A, 5/4/2023).     Family History  His family history includes Cancer in his maternal grandfather; Heart failure in his father; Peripheral vascular disease in his mother.    Social History  He reports that he has been smoking cigarettes. He has never used smokeless tobacco. He reports current alcohol use of about 2.0 - 4.0 standard drinks of alcohol per week. He reports that he does not use drugs.     Review of Systems   Constitutional:  Negative for activity change, appetite change, chills, diaphoresis, fatigue and fever.   HENT:   Negative for congestion, ear pain, tinnitus and trouble swallowing.    Eyes:  Negative for photophobia and pain.   Respiratory:  Negative for apnea, cough, choking, chest tightness, shortness of breath and stridor.    Cardiovascular:  Negative for chest pain, palpitations and leg swelling.   Endocrine: Negative for cold intolerance and heat intolerance.   Genitourinary:  Negative for difficulty urinating, dysuria, enuresis, flank pain, frequency and hematuria.   Musculoskeletal:  Negative for arthralgias, back pain and gait problem.   Neurological:  Negative for dizziness, seizures, syncope, facial asymmetry, speech difficulty, weakness, light-headedness, numbness and headaches.   Psychiatric/Behavioral:  Negative for agitation, behavioral problems, confusion and decreased concentration.    All other systems reviewed and are negative.       Objective   Physical Exam  Constitutional:       General: He is not in acute distress.     Appearance: Normal appearance. He is not ill-appearing, toxic-appearing or diaphoretic.   HENT:      Head: Normocephalic and atraumatic.      Nose: No congestion or rhinorrhea.      Mouth/Throat:      Mouth: Mucous membranes are moist.      Pharynx: Oropharynx is clear.   Eyes:      General: No scleral icterus.     Extraocular Movements: Extraocular movements intact.      Pupils: Pupils are equal, round, and reactive to light.   Cardiovascular:      Rate and Rhythm: Normal rate and regular rhythm.      Pulses: Normal pulses.      Heart sounds: Normal heart sounds. No murmur heard.     No friction rub. No gallop.   Pulmonary:      Effort: Pulmonary effort is normal. No respiratory distress.      Breath sounds: Normal breath sounds. No stridor. No wheezing or rhonchi.   Chest:      Chest wall: No tenderness.   Abdominal:      General: Abdomen is flat. There is no distension.      Palpations: Abdomen is soft. There is no mass.      Tenderness: There is no abdominal tenderness. There is no  "guarding or rebound.      Hernia: No hernia is present.   Musculoskeletal:         General: No swelling, tenderness, deformity or signs of injury.      Cervical back: Normal range of motion and neck supple. No rigidity or tenderness.      Right lower leg: No edema.      Left lower leg: No edema.   Skin:     General: Skin is warm.      Capillary Refill: Capillary refill takes less than 2 seconds.      Coloration: Skin is not jaundiced or pale.      Findings: No bruising, erythema, lesion or rash.   Neurological:      General: No focal deficit present.      Mental Status: He is alert and oriented to person, place, and time.   Psychiatric:         Mood and Affect: Mood normal.         Behavior: Behavior normal.         Thought Content: Thought content normal.         Judgment: Judgment normal.       VITAL SIGNS: 24 HR MIN & MAX LAST    @FLOWSTAT(6:24::1)@           @FLOWSTAT(5:24::1)@  113/75     @FLOWSTAT(8:24::1)@  72     @FLOWSTAT(9:24::1)@       @FLOWSTAT(10:24::1)@         HT: 5' 5" (165.1 cm)  WT: 88.8 kg (195 lb 12.8 oz)  BMI: 32.6       Assessment & Plan     Right adrenal incidentaloma, indeterminate imaging characteristics, increase in size from 4 cm to 5.3 cm, nonfunctional based on clinical history and biochemical studies    Diagnosis, staging, prognosis and treatment guidelines for adrenal incidentaloma were discussed with the patient in detail, all questions were addressed.  I explained given the indeterminate imaging characteristics of his lesion as well as a significant increase in size over a relatively short period of time, malignancy can not be completely excluded.  I also explained the risk and potential complications of percutaneous biopsy of the adrenal gland.  Based on current guidelines, resection of this lesion is indicated for diagnostic and potentially therapeutic purposes.    Schedule laparoscopic/robotic right adrenalectomy    Preoperative cardiac evaluation/clearance    The risks and " benefits of the procedure were explained in detail using layman terms, including the pros and cons of any implant that may be used, all questions were addressed, the patient gives consent to proceed    Vishal Chen MD  Surgical Oncology  Complex General, Gastrointestinal and Hepatobiliary Surgery

## 2023-09-28 DIAGNOSIS — E27.8 RIGHT ADRENAL MASS: Primary | ICD-10-CM

## 2023-09-28 DIAGNOSIS — Z01.818 PREOP TESTING: ICD-10-CM

## 2023-09-28 RX ORDER — ENOXAPARIN SODIUM 300 MG/3ML
30 INJECTION INTRAVENOUS; SUBCUTANEOUS EVERY 24 HOURS
Status: CANCELLED | OUTPATIENT
Start: 2023-09-28

## 2023-10-17 ENCOUNTER — DOCUMENTATION ONLY (OUTPATIENT)
Dept: SURGICAL ONCOLOGY | Facility: CLINIC | Age: 68
End: 2023-10-17
Payer: MEDICARE

## 2023-10-17 NOTE — PROGRESS NOTES
Patient notified by [facility] and our office to hold cilostazol x 7 days prior to procedure with Dr. Chen on 10/27/23

## 2023-10-18 RX ORDER — MULTIVITAMIN
1 TABLET ORAL DAILY
COMMUNITY

## 2023-10-18 RX ORDER — MICONAZOLE NITRATE 2 %
2 CREAM (GRAM) TOPICAL
COMMUNITY

## 2023-10-23 ENCOUNTER — HOSPITAL ENCOUNTER (OUTPATIENT)
Dept: RADIOLOGY | Facility: HOSPITAL | Age: 68
Discharge: HOME | End: 2023-10-23
Attending: SURGERY
Payer: MEDICARE

## 2023-10-23 ENCOUNTER — ANESTHESIA EVENT (OUTPATIENT)
Dept: SURGERY | Facility: HOSPITAL | Age: 68
DRG: 983 | End: 2023-10-23
Payer: MEDICARE

## 2023-10-23 DIAGNOSIS — E27.8 RIGHT ADRENAL MASS: ICD-10-CM

## 2023-10-23 DIAGNOSIS — Z01.818 PREOP TESTING: ICD-10-CM

## 2023-10-23 PROCEDURE — 71045 X-RAY EXAM CHEST 1 VIEW: CPT | Mod: TC

## 2023-10-27 ENCOUNTER — HOSPITAL ENCOUNTER (INPATIENT)
Facility: HOSPITAL | Age: 68
LOS: 1 days | Discharge: HOME | DRG: 983 | End: 2023-10-27
Attending: SURGERY | Admitting: SURGERY
Payer: MEDICARE

## 2023-10-27 ENCOUNTER — ANESTHESIA (OUTPATIENT)
Dept: SURGERY | Facility: HOSPITAL | Age: 68
DRG: 983 | End: 2023-10-27
Payer: MEDICARE

## 2023-10-27 DIAGNOSIS — E27.8 RIGHT ADRENAL MASS: ICD-10-CM

## 2023-10-27 PROCEDURE — 60650 PR LAP,ADRENALECTOMY: ICD-10-PCS | Mod: AS,RT,, | Performed by: NURSE PRACTITIONER

## 2023-10-27 PROCEDURE — 71000016 HC POSTOP RECOV ADDL HR: Performed by: SURGERY

## 2023-10-27 PROCEDURE — 88307 TISSUE EXAM BY PATHOLOGIST: CPT | Performed by: SURGERY

## 2023-10-27 PROCEDURE — 25000242 PHARM REV CODE 250 ALT 637 W/ HCPCS: Performed by: ANESTHESIOLOGY

## 2023-10-27 PROCEDURE — 71000033 HC RECOVERY, INTIAL HOUR: Performed by: SURGERY

## 2023-10-27 PROCEDURE — 12000002 HC ACUTE/MED SURGE SEMI-PRIVATE ROOM

## 2023-10-27 PROCEDURE — 37000008 HC ANESTHESIA 1ST 15 MINUTES: Performed by: SURGERY

## 2023-10-27 PROCEDURE — 36000713 HC OR TIME LEV V EA ADD 15 MIN: Performed by: SURGERY

## 2023-10-27 PROCEDURE — A4216 STERILE WATER/SALINE, 10 ML: HCPCS | Performed by: SURGERY

## 2023-10-27 PROCEDURE — D9220A PRA ANESTHESIA: Mod: ANES,,, | Performed by: ANESTHESIOLOGY

## 2023-10-27 PROCEDURE — D9220A PRA ANESTHESIA: ICD-10-PCS | Mod: ANES,,, | Performed by: ANESTHESIOLOGY

## 2023-10-27 PROCEDURE — 60650 LAPAROSCOPY ADRENALECTOMY: CPT | Mod: RT,,, | Performed by: SURGERY

## 2023-10-27 PROCEDURE — 60650 PR LAP,ADRENALECTOMY: ICD-10-PCS | Mod: RT,,, | Performed by: SURGERY

## 2023-10-27 PROCEDURE — 63600175 PHARM REV CODE 636 W HCPCS: Performed by: SURGERY

## 2023-10-27 PROCEDURE — 27201423 OPTIME MED/SURG SUP & DEVICES STERILE SUPPLY: Performed by: SURGERY

## 2023-10-27 PROCEDURE — 71000015 HC POSTOP RECOV 1ST HR: Performed by: SURGERY

## 2023-10-27 PROCEDURE — D9220A PRA ANESTHESIA: ICD-10-PCS | Mod: CRNA,,, | Performed by: STUDENT IN AN ORGANIZED HEALTH CARE EDUCATION/TRAINING PROGRAM

## 2023-10-27 PROCEDURE — 63600175 PHARM REV CODE 636 W HCPCS

## 2023-10-27 PROCEDURE — D9220A PRA ANESTHESIA: Mod: CRNA,,, | Performed by: STUDENT IN AN ORGANIZED HEALTH CARE EDUCATION/TRAINING PROGRAM

## 2023-10-27 PROCEDURE — 25000003 PHARM REV CODE 250: Performed by: SURGERY

## 2023-10-27 PROCEDURE — 37000009 HC ANESTHESIA EA ADD 15 MINS: Performed by: SURGERY

## 2023-10-27 PROCEDURE — C9290 INJ, BUPIVACAINE LIPOSOME: HCPCS | Performed by: SURGERY

## 2023-10-27 PROCEDURE — 36000712 HC OR TIME LEV V 1ST 15 MIN: Performed by: SURGERY

## 2023-10-27 PROCEDURE — 25000003 PHARM REV CODE 250

## 2023-10-27 PROCEDURE — 60650 LAPAROSCOPY ADRENALECTOMY: CPT | Mod: AS,RT,, | Performed by: NURSE PRACTITIONER

## 2023-10-27 RX ORDER — FENTANYL CITRATE 50 UG/ML
INJECTION, SOLUTION INTRAMUSCULAR; INTRAVENOUS
Status: DISCONTINUED | OUTPATIENT
Start: 2023-10-27 | End: 2023-10-27

## 2023-10-27 RX ORDER — HYDROMORPHONE HYDROCHLORIDE 2 MG/ML
0.2 INJECTION, SOLUTION INTRAMUSCULAR; INTRAVENOUS; SUBCUTANEOUS EVERY 5 MIN PRN
Status: DISCONTINUED | OUTPATIENT
Start: 2023-10-27 | End: 2023-11-02 | Stop reason: HOSPADM

## 2023-10-27 RX ORDER — MEPERIDINE HYDROCHLORIDE 25 MG/ML
12.5 INJECTION INTRAMUSCULAR; INTRAVENOUS; SUBCUTANEOUS ONCE
Status: DISCONTINUED | OUTPATIENT
Start: 2023-10-27 | End: 2023-10-28 | Stop reason: HOSPADM

## 2023-10-27 RX ORDER — IPRATROPIUM BROMIDE AND ALBUTEROL SULFATE 2.5; .5 MG/3ML; MG/3ML
3 SOLUTION RESPIRATORY (INHALATION) ONCE
Status: COMPLETED | OUTPATIENT
Start: 2023-10-27 | End: 2023-10-27

## 2023-10-27 RX ORDER — ROCURONIUM BROMIDE 10 MG/ML
INJECTION, SOLUTION INTRAVENOUS
Status: DISCONTINUED | OUTPATIENT
Start: 2023-10-27 | End: 2023-10-27

## 2023-10-27 RX ORDER — PROPOFOL 10 MG/ML
VIAL (ML) INTRAVENOUS
Status: DISCONTINUED | OUTPATIENT
Start: 2023-10-27 | End: 2023-10-27

## 2023-10-27 RX ORDER — HYDROMORPHONE HYDROCHLORIDE 2 MG/ML
0.5 INJECTION, SOLUTION INTRAMUSCULAR; INTRAVENOUS; SUBCUTANEOUS EVERY 5 MIN PRN
Status: DISCONTINUED | OUTPATIENT
Start: 2023-10-27 | End: 2023-11-02 | Stop reason: HOSPADM

## 2023-10-27 RX ORDER — ENOXAPARIN SODIUM 100 MG/ML
30 INJECTION SUBCUTANEOUS EVERY 24 HOURS
Status: DISCONTINUED | OUTPATIENT
Start: 2023-10-27 | End: 2023-11-02 | Stop reason: HOSPADM

## 2023-10-27 RX ORDER — HYDROCODONE BITARTRATE AND ACETAMINOPHEN 5; 325 MG/1; MG/1
1 TABLET ORAL EVERY 6 HOURS PRN
Qty: 10 TABLET | Refills: 0 | Status: ON HOLD | OUTPATIENT
Start: 2023-10-27 | End: 2023-11-09 | Stop reason: HOSPADM

## 2023-10-27 RX ORDER — ONDANSETRON 2 MG/ML
INJECTION INTRAMUSCULAR; INTRAVENOUS
Status: DISCONTINUED | OUTPATIENT
Start: 2023-10-27 | End: 2023-10-27

## 2023-10-27 RX ORDER — CEFAZOLIN SODIUM 2 G/50ML
2 SOLUTION INTRAVENOUS
Status: COMPLETED | OUTPATIENT
Start: 2023-10-27 | End: 2023-10-27

## 2023-10-27 RX ORDER — MIDAZOLAM HYDROCHLORIDE 1 MG/ML
INJECTION INTRAMUSCULAR; INTRAVENOUS
Status: DISCONTINUED | OUTPATIENT
Start: 2023-10-27 | End: 2023-10-27

## 2023-10-27 RX ORDER — BUPIVACAINE HYDROCHLORIDE 5 MG/ML
INJECTION, SOLUTION EPIDURAL; INTRACAUDAL
Status: DISCONTINUED | OUTPATIENT
Start: 2023-10-27 | End: 2023-10-27 | Stop reason: HOSPADM

## 2023-10-27 RX ORDER — EPHEDRINE SULFATE 50 MG/ML
INJECTION, SOLUTION INTRAVENOUS
Status: DISCONTINUED | OUTPATIENT
Start: 2023-10-27 | End: 2023-10-27

## 2023-10-27 RX ORDER — PHENYLEPHRINE HCL IN 0.9% NACL 1 MG/10 ML
SYRINGE (ML) INTRAVENOUS
Status: DISCONTINUED | OUTPATIENT
Start: 2023-10-27 | End: 2023-10-27

## 2023-10-27 RX ORDER — SODIUM CHLORIDE 0.9 % (FLUSH) 0.9 %
SYRINGE (ML) INJECTION
Status: DISCONTINUED | OUTPATIENT
Start: 2023-10-27 | End: 2023-10-27 | Stop reason: HOSPADM

## 2023-10-27 RX ORDER — DIPHENHYDRAMINE HYDROCHLORIDE 50 MG/ML
25 INJECTION INTRAMUSCULAR; INTRAVENOUS EVERY 6 HOURS PRN
Status: DISCONTINUED | OUTPATIENT
Start: 2023-10-27 | End: 2023-11-02 | Stop reason: HOSPADM

## 2023-10-27 RX ORDER — ENOXAPARIN SODIUM 100 MG/ML
30 INJECTION SUBCUTANEOUS EVERY 24 HOURS
Status: DISCONTINUED | OUTPATIENT
Start: 2023-10-27 | End: 2023-10-27

## 2023-10-27 RX ORDER — ONDANSETRON 2 MG/ML
4 INJECTION INTRAMUSCULAR; INTRAVENOUS ONCE
Status: DISCONTINUED | OUTPATIENT
Start: 2023-10-27 | End: 2023-11-02 | Stop reason: HOSPADM

## 2023-10-27 RX ORDER — HYDROCODONE BITARTRATE AND ACETAMINOPHEN 5; 325 MG/1; MG/1
1 TABLET ORAL ONCE
Status: COMPLETED | OUTPATIENT
Start: 2023-10-27 | End: 2023-10-27

## 2023-10-27 RX ORDER — GLYCOPYRROLATE 0.2 MG/ML
INJECTION INTRAMUSCULAR; INTRAVENOUS
Status: DISCONTINUED | OUTPATIENT
Start: 2023-10-27 | End: 2023-10-27

## 2023-10-27 RX ORDER — DEXAMETHASONE SODIUM PHOSPHATE 4 MG/ML
INJECTION, SOLUTION INTRA-ARTICULAR; INTRALESIONAL; INTRAMUSCULAR; INTRAVENOUS; SOFT TISSUE
Status: DISCONTINUED | OUTPATIENT
Start: 2023-10-27 | End: 2023-10-27

## 2023-10-27 RX ORDER — LIDOCAINE HYDROCHLORIDE 20 MG/ML
INJECTION, SOLUTION EPIDURAL; INFILTRATION; INTRACAUDAL; PERINEURAL
Status: DISCONTINUED | OUTPATIENT
Start: 2023-10-27 | End: 2023-10-27

## 2023-10-27 RX ADMIN — SODIUM CHLORIDE, SODIUM GLUCONATE, SODIUM ACETATE, POTASSIUM CHLORIDE AND MAGNESIUM CHLORIDE: 526; 502; 368; 37; 30 INJECTION, SOLUTION INTRAVENOUS at 10:10

## 2023-10-27 RX ADMIN — PROPOFOL 100 MG: 10 INJECTION, EMULSION INTRAVENOUS at 10:10

## 2023-10-27 RX ADMIN — Medication 100 MCG: at 12:10

## 2023-10-27 RX ADMIN — HYDROCODONE BITARTRATE AND ACETAMINOPHEN 1 TABLET: 5; 325 TABLET ORAL at 02:10

## 2023-10-27 RX ADMIN — FENTANYL CITRATE 50 MCG: 50 INJECTION, SOLUTION INTRAMUSCULAR; INTRAVENOUS at 10:10

## 2023-10-27 RX ADMIN — SUGAMMADEX 200 MG: 100 INJECTION, SOLUTION INTRAVENOUS at 12:10

## 2023-10-27 RX ADMIN — Medication 100 MCG: at 11:10

## 2023-10-27 RX ADMIN — CEFAZOLIN SODIUM 2 G: 2 SOLUTION INTRAVENOUS at 11:10

## 2023-10-27 RX ADMIN — EPHEDRINE SULFATE 10 MG: 50 INJECTION INTRAVENOUS at 11:10

## 2023-10-27 RX ADMIN — IPRATROPIUM BROMIDE AND ALBUTEROL SULFATE 3 ML: 2.5; .5 SOLUTION RESPIRATORY (INHALATION) at 12:10

## 2023-10-27 RX ADMIN — MIDAZOLAM HYDROCHLORIDE 2 MG: 1 INJECTION, SOLUTION INTRAMUSCULAR; INTRAVENOUS at 10:10

## 2023-10-27 RX ADMIN — ONDANSETRON 8 MG: 2 INJECTION INTRAMUSCULAR; INTRAVENOUS at 12:10

## 2023-10-27 RX ADMIN — ENOXAPARIN SODIUM 30 MG: 30 INJECTION SUBCUTANEOUS at 07:10

## 2023-10-27 RX ADMIN — DEXAMETHASONE SODIUM PHOSPHATE 8 MG: 4 INJECTION, SOLUTION INTRA-ARTICULAR; INTRALESIONAL; INTRAMUSCULAR; INTRAVENOUS; SOFT TISSUE at 11:10

## 2023-10-27 RX ADMIN — LIDOCAINE HYDROCHLORIDE 80 MG: 20 INJECTION, SOLUTION EPIDURAL; INFILTRATION; INTRACAUDAL; PERINEURAL at 10:10

## 2023-10-27 RX ADMIN — GLYCOPYRROLATE 0.2 MG: 0.2 INJECTION INTRAMUSCULAR; INTRAVENOUS at 11:10

## 2023-10-27 RX ADMIN — ROCURONIUM BROMIDE 60 MG: 10 SOLUTION INTRAVENOUS at 11:10

## 2023-10-27 NOTE — ANESTHESIA POSTPROCEDURE EVALUATION
Anesthesia Post Evaluation    Patient: Richard Healy    Procedure(s) Performed: Procedure(s) (LRB):  XI ROBOTIC ADRENALECTOMY (Right)    Final Anesthesia Type: general      Patient location during evaluation: PACU  Patient participation: Yes- Able to Participate  Level of consciousness: awake and alert  Post-procedure vital signs: reviewed and stable  Pain management: adequate  Airway patency: patent      Anesthetic complications: no      Cardiovascular status: hemodynamically stable  Respiratory status: unassisted  Hydration status: euvolemic  Follow-up not needed.          Vitals Value Taken Time   /70 10/27/23 1301   Temp 36.3 °C (97.3 °F) 10/27/23 1240   Pulse 74 10/27/23 1303   Resp 10 10/27/23 1303   SpO2 93 % 10/27/23 1303   Vitals shown include unvalidated device data.      No case tracking events are documented in the log.      Pain/Kacey Score: Kacey Score: 5 (10/27/2023 12:40 PM)

## 2023-10-27 NOTE — OP NOTE
Date of Surgery:  10/27/2023    Surgeon:  Vishal Chen MD    Assistant:  Millie MIDDLETON                                        Pre-op Diagnosis:  Right adrenal mass, 5 cm    Post-op Diagnosis:  Same    Procedure:    1. Laparoscopic/robotic right adrenalectomy     Anesthesia:  GETA    EBL:  50 cc    Specimens:  Right adrenal gland    Findings:  Right adrenal mass was completely excised, no evidence of rupture or disruption of the capsule.  No evidence of metastatic disease.  No evidence of invasion of local structures or malignancy    Procedure in detail:  After informed consent was obtained patient brought to operating placed supine position.  General endotracheal anesthesia administered difficulty.  The patient was positioned with right side bumped up and pressure points carefully padded.  Under ultrasound guidance bilateral transversus abdominis plane nerve blocks were performed using liposomal bupivacaine.  Right subcostal incision was made optical trocar used into peritoneal cavity under direct vision.  Pneumoperitoneum was achieved without difficulty.  Additional ports were placed under direct vision.  Patient was placed in reverse Trendelenburg and tilted towards the left.  The robot was docked.  The right lobe of the liver was carefully retracted anteriorly.  Peritoneal reflection at the posterior border of the liver was incised and the right suprarenal fossa was exposed.  There was an obviously enlarged right adrenal gland with associated mass.  The vena cava was identified and then it is right edge dissected superiorly.  Right lobe of the liver was further mobilized and reflected to the patient's left.  The right adrenal vein was carefully meticulously dissected its junction with the vena cava the adrenal arteries were then dissected the inferior border of the gland and divided using the vessel sealer.  The adrenal vein was then divided between hemoclips.  The gland was then dissected out of the  retroperitoneum carefully with entering vessels divided using the vessel sealer.  There was no evidence of invasion of local structures that were obvious evidence of malignancy.  Once the gland was completely freed along with its surrounding Gerota's meticulous hemostasis was achieved.  The robot was undocked and the specimen was placed in an Endo-Catch bag and removed by enlarging 1 of the port sites.  There was no evidence of capsular rupture and the tumor was completely excised.  [place] hemostatic agent was placed in the resection bed liver was returned to its normal anatomic position.  Ports removed pneumoperitoneum was relieved port sites were closed absorbable suture sterile dressings were applied the patient tolerated the procedure well.    Significant surgical tasks conducted by the assistant:  The skilled assistance of the nurse practitioner KANE Bhardwaj was necessary for the successful completion of this case.  She was essential for the proper positioning of the patient, manipulation of instruments, proper exposure, manipulation of tissue, and wound closure        Vishal Chen MD  Surgical Oncology  Complex General, Gastrointestinal and Hepatobiliary Surgery

## 2023-10-27 NOTE — ANESTHESIA PREPROCEDURE EVALUATION
10/27/2023  Richard Healy is a 67 y.o., male with a right adrenal incidentaloma identified in 2022 when it measured 4 cm in size.  He demonstrates no evidence of functional symptoms.  Functional workup including catecholamines and cortisol levels were normal.  Follow-up imaging was performed and shows increase in size of the lesion, personally interpreted the images and report, now up to 5.3 cm in maximum diameter with heterogeneous appearance and indeterminate Hounsfield unit enhancement.  He denies chest pain, dyspnea on exertion, flushing, palpitations, headaches, uncontrolled blood pressure or anxiety  Cardiology Note:      Echo 65%, mild MR    Emphasematous changes on Chest CT  PFT:      Pre-op Assessment          Review of Systems      Physical Exam  General: Well nourished, Cooperative, Alert and Oriented    Airway:  Mallampati: II   Mouth Opening: Normal  TM Distance: Normal  Tongue: Normal  Neck ROM: Normal ROM    Dental:  Intact, Periodontal disease        Anesthesia Plan  Type of Anesthesia, risks & benefits discussed:    Anesthesia Type: Gen ETT  Intra-op Monitoring Plan: Standard ASA Monitors  Post Op Pain Control Plan: multimodal analgesia  Induction:  IV  Airway Plan: Direct, Post-Induction  Informed Consent: Informed consent signed with the Patient and all parties understand the risks and agree with anesthesia plan.  All questions answered. Patient consented to blood products? Yes  ASA Score: 3  Day of Surgery Review of History & Physical: H&P Update referred to the surgeon/provider.  Anesthesia Plan Notes: LBIV x 2  Postop Duoneb    Ready For Surgery From Anesthesia Perspective.     .

## 2023-10-27 NOTE — ANESTHESIA PROCEDURE NOTES
Intubation    Date/Time: 10/27/2023 11:02 AM    Performed by: Eli Lorenzo  Authorized by: Isis Bailey MD    Intubation:     Induction:  Intravenous    Intubated:  Postinduction    Mask Ventilation:  Easy mask    Attempts:  1    Attempted By:  Student and CRNA (SAUL Huerta)    Method of Intubation:  Direct    Blade:  Pelaez 2    Laryngeal View Grade: Grade I - full view of cords      Difficult Airway Encountered?: No      Complications:  None    Airway Device:  Oral endotracheal tube    Airway Device Size:  7.5    Style/Cuff Inflation:  Cuffed    Inflation Amount (mL):  6    Tube secured:  21    Secured at:  The teeth    Placement Verified By:  Capnometry    Complicating Factors:  None    Findings Post-Intubation:  BS equal bilateral

## 2023-10-27 NOTE — TRANSFER OF CARE
"Anesthesia Transfer of Care Note    Patient: Richard Healy    Procedure(s) Performed: Procedure(s) (LRB):  XI ROBOTIC ADRENALECTOMY (Right)    Patient location: PACU    Anesthesia Type: general    Transport from OR: Transported from OR on room air with adequate spontaneous ventilation    Post pain: adequate analgesia    Post assessment: no apparent anesthetic complications    Post vital signs: stable    Level of consciousness: sedated    Nausea/Vomiting: no nausea/vomiting    Complications: none    Transfer of care protocol was followed      Last vitals:   Visit Vitals  /84   Pulse 61   Temp 36.7 °C (98 °F) (Oral)   Resp 14   Ht 5' 5" (1.651 m)   Wt 88.3 kg (194 lb 10.7 oz)   SpO2 (!) 92%   BMI 32.39 kg/m²     "

## 2023-10-27 NOTE — DISCHARGE INSTRUCTIONS
-NO driving and NO alcohol consumption for 24 hours and while taking narcotic pain medications.    -If you have a blue armband, you have received Exparel. Exparel is a medication that is used to ease pain at the incision sites. It can last up to 5 days. (see attached)    -Keep sites clean and dry for 2 days. Ok to shower afterwards. Do not submerge sites under water.    -NO heavy lifting. Do not lift objects greater than 10 lbs. Use caution when bending, pulling, pushing, lifting.    -Monitor sites for infection: redness, swelling, drainage/pus/foul odor, fever, chills.    -Report to your nearest ER if you experience and/or notify your provider if you experience any SUDDEN/SEVERE chest/abdominal pain, weakness, trouble breathing, uncontrolled pain.    BLEEDING: If surgical site begins to bleed, contact your doctor or go to ER.    NAUSEA: due to the anesthesia, you may experience nausea for up to 24 hours. If nausea and vomiting last longer, contact your doctor.     INFECTION:  watch for any signs or symptoms of infection such as chills, fever, redness or drainage at surgical site. Notify your doctor,     PAIN : take your pain medications as directed. If the pain medications are not helping, notify your doctor.

## 2023-10-30 VITALS
SYSTOLIC BLOOD PRESSURE: 132 MMHG | DIASTOLIC BLOOD PRESSURE: 84 MMHG | RESPIRATION RATE: 18 BRPM | WEIGHT: 194.69 LBS | HEART RATE: 77 BPM | OXYGEN SATURATION: 93 % | BODY MASS INDEX: 32.44 KG/M2 | HEIGHT: 65 IN | TEMPERATURE: 97 F

## 2023-10-31 LAB — PSYCHE PATHOLOGY RESULT: NORMAL

## 2023-11-06 NOTE — DISCHARGE SUMMARY
[facility] - Periop Services  Discharge Note  Short Stay    Procedure(s) (LRB):  XI ROBOTIC ADRENALECTOMY (Right)      OUTCOME: Patient tolerated treatment/procedure well without complication and is now ready for discharge.    DISPOSITION: Home or Self Care    FINAL DIAGNOSIS:  <principal problem not specified>    FOLLOWUP: In clinic    DISCHARGE INSTRUCTIONS:  No discharge procedures on file.      Clinical Reference Documents Added to Patient Instructions         Document    ADRENALECTOMY, LAPAROSCOPIC SURGERY (ENGLISH)    BUPIVACAINE (LIPOSOMAL), ADULT (ENGLISH)    LAPAROSCOPY (ENGLISH)    ROBOT-ASSISTED SURGERY (ENGLISH)            TIME SPENT ON DISCHARGE:  minutes

## 2023-11-08 ENCOUNTER — HOSPITAL ENCOUNTER (OUTPATIENT)
Facility: HOSPITAL | Age: 68
Discharge: HOME | End: 2023-11-09
Attending: INTERNAL MEDICINE | Admitting: INTERNAL MEDICINE
Payer: MEDICARE

## 2023-11-08 DIAGNOSIS — I70.203 BILATERAL FEMORAL ARTERY STENOSIS: ICD-10-CM

## 2023-11-08 DIAGNOSIS — I70.212 ATHEROSCLEROSIS OF NATIVE ARTERY OF LEFT LOWER EXTREMITY WITH INTERMITTENT CLAUDICATION: Primary | ICD-10-CM

## 2023-11-08 PROCEDURE — 25000003 PHARM REV CODE 250: Performed by: INTERNAL MEDICINE

## 2023-11-08 PROCEDURE — 99152 MOD SED SAME PHYS/QHP 5/>YRS: CPT | Performed by: INTERNAL MEDICINE

## 2023-11-08 PROCEDURE — C1730 CATH, EP, 19 OR FEW ELECT: HCPCS | Performed by: INTERNAL MEDICINE

## 2023-11-08 PROCEDURE — C1725 CATH, TRANSLUMIN NON-LASER: HCPCS | Performed by: INTERNAL MEDICINE

## 2023-11-08 PROCEDURE — 63600175 PHARM REV CODE 636 W HCPCS: Performed by: INTERNAL MEDICINE

## 2023-11-08 PROCEDURE — C1887 CATHETER, GUIDING: HCPCS | Performed by: INTERNAL MEDICINE

## 2023-11-08 PROCEDURE — C1894 INTRO/SHEATH, NON-LASER: HCPCS | Performed by: INTERNAL MEDICINE

## 2023-11-08 PROCEDURE — 75630 X-RAY AORTA LEG ARTERIES: CPT | Mod: 59 | Performed by: INTERNAL MEDICINE

## 2023-11-08 PROCEDURE — C1874 STENT, COATED/COV W/DEL SYS: HCPCS | Performed by: INTERNAL MEDICINE

## 2023-11-08 PROCEDURE — 25500020 PHARM REV CODE 255: Performed by: INTERNAL MEDICINE

## 2023-11-08 PROCEDURE — 37252 INTRVASC US NONCORONARY 1ST: CPT | Performed by: INTERNAL MEDICINE

## 2023-11-08 PROCEDURE — 99153 MOD SED SAME PHYS/QHP EA: CPT | Performed by: INTERNAL MEDICINE

## 2023-11-08 PROCEDURE — G0378 HOSPITAL OBSERVATION PER HR: HCPCS

## 2023-11-08 PROCEDURE — 37253 INTRVASC US NONCORONARY ADDL: CPT | Performed by: INTERNAL MEDICINE

## 2023-11-08 PROCEDURE — C1769 GUIDE WIRE: HCPCS | Performed by: INTERNAL MEDICINE

## 2023-11-08 PROCEDURE — 27201423 OPTIME MED/SURG SUP & DEVICES STERILE SUPPLY: Performed by: INTERNAL MEDICINE

## 2023-11-08 PROCEDURE — C9765 REVASC INTRA LITHOTRIP-STENT: HCPCS | Performed by: INTERNAL MEDICINE

## 2023-11-08 PROCEDURE — 85347 COAGULATION TIME ACTIVATED: CPT | Performed by: INTERNAL MEDICINE

## 2023-11-08 DEVICE — VIABAHN BX BALLOON EXP ENDO 7MMX59MM 7FR 135CMCATH HEPARIN
Type: IMPLANTABLE DEVICE | Site: GROIN | Status: FUNCTIONAL
Brand: GORE VIABAHN VBX BALLOON EXPANDABLE ENDO

## 2023-11-08 RX ORDER — DIPHENHYDRAMINE HCL 25 MG
50 CAPSULE ORAL
Status: DISCONTINUED | OUTPATIENT
Start: 2023-11-08 | End: 2023-11-08 | Stop reason: HOSPADM

## 2023-11-08 RX ORDER — HYDRALAZINE HYDROCHLORIDE 20 MG/ML
10 INJECTION INTRAMUSCULAR; INTRAVENOUS EVERY 4 HOURS PRN
Status: DISCONTINUED | OUTPATIENT
Start: 2023-11-08 | End: 2023-11-09 | Stop reason: HOSPADM

## 2023-11-08 RX ORDER — SODIUM CHLORIDE 9 MG/ML
INJECTION, SOLUTION INTRAVENOUS ONCE
Status: COMPLETED | OUTPATIENT
Start: 2023-11-08 | End: 2023-11-08

## 2023-11-08 RX ORDER — HYDROCODONE BITARTRATE AND ACETAMINOPHEN 5; 325 MG/1; MG/1
1 TABLET ORAL EVERY 4 HOURS PRN
Status: DISCONTINUED | OUTPATIENT
Start: 2023-11-08 | End: 2023-11-09 | Stop reason: HOSPADM

## 2023-11-08 RX ORDER — CLOPIDOGREL 300 MG/1
TABLET, FILM COATED ORAL
Status: DISCONTINUED | OUTPATIENT
Start: 2023-11-08 | End: 2023-11-08 | Stop reason: HOSPADM

## 2023-11-08 RX ORDER — ATORVASTATIN CALCIUM 40 MG/1
40 TABLET, FILM COATED ORAL DAILY
Status: DISCONTINUED | OUTPATIENT
Start: 2023-11-09 | End: 2023-11-09 | Stop reason: HOSPADM

## 2023-11-08 RX ORDER — MIDAZOLAM HYDROCHLORIDE 1 MG/ML
INJECTION INTRAMUSCULAR; INTRAVENOUS
Status: DISCONTINUED | OUTPATIENT
Start: 2023-11-08 | End: 2023-11-08 | Stop reason: HOSPADM

## 2023-11-08 RX ORDER — DIAZEPAM 5 MG/1
10 TABLET ORAL
Status: DISCONTINUED | OUTPATIENT
Start: 2023-11-08 | End: 2023-11-08 | Stop reason: HOSPADM

## 2023-11-08 RX ORDER — ASPIRIN 81 MG/1
81 TABLET ORAL DAILY
Status: DISCONTINUED | OUTPATIENT
Start: 2023-11-09 | End: 2023-11-09 | Stop reason: HOSPADM

## 2023-11-08 RX ORDER — PROTAMINE SULFATE 10 MG/ML
10 INJECTION, SOLUTION INTRAVENOUS ONCE
Status: DISCONTINUED | OUTPATIENT
Start: 2023-11-08 | End: 2023-11-09 | Stop reason: HOSPADM

## 2023-11-08 RX ORDER — IBUPROFEN 200 MG
1 TABLET ORAL DAILY
Status: DISCONTINUED | OUTPATIENT
Start: 2023-11-09 | End: 2023-11-09 | Stop reason: HOSPADM

## 2023-11-08 RX ORDER — MAG HYDROX/ALUMINUM HYD/SIMETH 200-200-20
SUSPENSION, ORAL (FINAL DOSE FORM) ORAL
Status: DISCONTINUED | OUTPATIENT
Start: 2023-11-08 | End: 2023-11-08 | Stop reason: HOSPADM

## 2023-11-08 RX ORDER — ACETAMINOPHEN 325 MG/1
650 TABLET ORAL EVERY 4 HOURS PRN
Status: DISCONTINUED | OUTPATIENT
Start: 2023-11-08 | End: 2023-11-09 | Stop reason: HOSPADM

## 2023-11-08 RX ORDER — SODIUM CHLORIDE 9 MG/ML
INJECTION, SOLUTION INTRAVENOUS CONTINUOUS
Status: ACTIVE | OUTPATIENT
Start: 2023-11-08 | End: 2023-11-08

## 2023-11-08 RX ORDER — CLOPIDOGREL BISULFATE 75 MG/1
75 TABLET ORAL DAILY
Qty: 90 TABLET | Refills: 3
Start: 2023-11-08 | End: 2024-11-07

## 2023-11-08 RX ORDER — CLOPIDOGREL BISULFATE 75 MG/1
75 TABLET ORAL DAILY
Status: DISCONTINUED | OUTPATIENT
Start: 2023-11-09 | End: 2023-11-09 | Stop reason: HOSPADM

## 2023-11-08 RX ORDER — FENTANYL CITRATE 50 UG/ML
INJECTION, SOLUTION INTRAMUSCULAR; INTRAVENOUS
Status: DISCONTINUED | OUTPATIENT
Start: 2023-11-08 | End: 2023-11-08 | Stop reason: HOSPADM

## 2023-11-08 RX ORDER — ONDANSETRON 4 MG/1
8 TABLET, ORALLY DISINTEGRATING ORAL EVERY 8 HOURS PRN
Status: DISCONTINUED | OUTPATIENT
Start: 2023-11-08 | End: 2023-11-09 | Stop reason: HOSPADM

## 2023-11-08 RX ORDER — LIDOCAINE HYDROCHLORIDE 10 MG/ML
INJECTION INFILTRATION; PERINEURAL
Status: DISCONTINUED | OUTPATIENT
Start: 2023-11-08 | End: 2023-11-08 | Stop reason: HOSPADM

## 2023-11-08 RX ORDER — MORPHINE SULFATE 4 MG/ML
2 INJECTION, SOLUTION INTRAMUSCULAR; INTRAVENOUS EVERY 4 HOURS PRN
Status: DISCONTINUED | OUTPATIENT
Start: 2023-11-08 | End: 2023-11-09 | Stop reason: HOSPADM

## 2023-11-08 RX ORDER — HEPARIN SODIUM 1000 [USP'U]/ML
INJECTION, SOLUTION INTRAVENOUS; SUBCUTANEOUS
Status: DISCONTINUED | OUTPATIENT
Start: 2023-11-08 | End: 2023-11-08 | Stop reason: HOSPADM

## 2023-11-08 RX ADMIN — DIAZEPAM 10 MG: 5 TABLET ORAL at 10:11

## 2023-11-08 RX ADMIN — DIPHENHYDRAMINE HYDROCHLORIDE 50 MG: 25 CAPSULE ORAL at 10:11

## 2023-11-08 RX ADMIN — SODIUM CHLORIDE: 9 INJECTION, SOLUTION INTRAVENOUS at 10:11

## 2023-11-08 NOTE — NURSING
Left arterial sheath pulled at 1550  Around 1559 BP and HR dropped  IV fluids opened and pt put into trendelenburg position  Called charge nurse for additional help  New IV bag hung and fluids wide open  Called Jonah from [facility]  Hematoma at left sheath site ID'd-additional pressure applied by 2nd nurse  Pressure bag put on IV bag  Confirmed last EF at 65%    BP and HR improved  Hematoma reduced after additional pressure applied  Quikclot and tegaderm applied  Sandbag in place

## 2023-11-08 NOTE — Clinical Note
artery was performed. A percutaneous stick to the left groin was performed. Ultrasound guidance was used to obtain access.

## 2023-11-08 NOTE — Clinical Note
An angiography of the  abdominal aorta and bilateral lower extremity was performed with the catheter and power injected with 15 mL contrast at at 10 mL/s.

## 2023-11-08 NOTE — DISCHARGE INSTRUCTIONS
-Remove dressing in 24hrs  -No driving for two Days  -Do not lift anything heavier than a gallon of milk for 5 days  -No tub bathing for 5 days (if you have a groin site).   -No lotions, powders, creams around site for 5 days  - Return to the nearest emergency room if you start running a fever; have any kind of discharge coming from the site, the site looks red or swollen.  - If site starts to bleed, lay flat on the ground, apply pressure to the site and call 911.    prescription for Plavix at the [facility] in [place].

## 2023-11-09 VITALS
WEIGHT: 209.69 LBS | OXYGEN SATURATION: 94 % | BODY MASS INDEX: 34.93 KG/M2 | TEMPERATURE: 98 F | DIASTOLIC BLOOD PRESSURE: 77 MMHG | SYSTOLIC BLOOD PRESSURE: 109 MMHG | HEIGHT: 65 IN | RESPIRATION RATE: 18 BRPM | HEART RATE: 91 BPM

## 2023-11-09 PROBLEM — I70.212 ATHEROSCLEROSIS OF NATIVE ARTERY OF LEFT LOWER EXTREMITY WITH INTERMITTENT CLAUDICATION: Status: ACTIVE | Noted: 2023-11-09

## 2023-11-09 LAB
ANION GAP SERPL CALC-SCNC: 5 MEQ/L
BASOPHILS # BLD AUTO: 0.06 X10(3)/MCL
BASOPHILS NFR BLD AUTO: 0.9 %
BUN SERPL-MCNC: 15.8 MG/DL (ref 8.4–25.7)
CALCIUM SERPL-MCNC: 8.5 MG/DL (ref 8.8–10)
CHLORIDE SERPL-SCNC: 107 MMOL/L (ref 98–107)
CO2 SERPL-SCNC: 23 MMOL/L (ref 23–31)
CREAT SERPL-MCNC: 0.8 MG/DL (ref 0.73–1.18)
CREAT/UREA NIT SERPL: 20
EOSINOPHIL # BLD AUTO: 0.18 X10(3)/MCL (ref 0–0.9)
EOSINOPHIL NFR BLD AUTO: 2.7 %
ERYTHROCYTE [DISTWIDTH] IN BLOOD BY AUTOMATED COUNT: 15.4 % (ref 11.5–17)
GFR SERPLBLD CREATININE-BSD FMLA CKD-EPI: >60 MLS/MIN/1.73/M2
GLUCOSE SERPL-MCNC: 110 MG/DL (ref 82–115)
HCT VFR BLD AUTO: 33.8 % (ref 42–52)
HGB BLD-MCNC: 11.4 G/DL (ref 14–18)
IMM GRANULOCYTES # BLD AUTO: 0.08 X10(3)/MCL (ref 0–0.04)
IMM GRANULOCYTES NFR BLD AUTO: 1.2 %
LYMPHOCYTES # BLD AUTO: 1 X10(3)/MCL (ref 0.6–4.6)
LYMPHOCYTES NFR BLD AUTO: 15 %
MCH RBC QN AUTO: 33 PG (ref 27–31)
MCHC RBC AUTO-ENTMCNC: 33.7 G/DL (ref 33–36)
MCV RBC AUTO: 98 FL (ref 80–94)
MONOCYTES # BLD AUTO: 0.55 X10(3)/MCL (ref 0.1–1.3)
MONOCYTES NFR BLD AUTO: 8.3 %
NEUTROPHILS # BLD AUTO: 4.79 X10(3)/MCL (ref 2.1–9.2)
NEUTROPHILS NFR BLD AUTO: 71.9 %
NRBC BLD AUTO-RTO: 0 %
PLATELET # BLD AUTO: 456 X10(3)/MCL (ref 130–400)
PMV BLD AUTO: 9.3 FL (ref 7.4–10.4)
POTASSIUM SERPL-SCNC: 3.6 MMOL/L (ref 3.5–5.1)
RBC # BLD AUTO: 3.45 X10(6)/MCL (ref 4.7–6.1)
SODIUM SERPL-SCNC: 135 MMOL/L (ref 136–145)
WBC # SPEC AUTO: 6.66 X10(3)/MCL (ref 4.5–11.5)

## 2023-11-09 PROCEDURE — G0378 HOSPITAL OBSERVATION PER HR: HCPCS

## 2023-11-09 PROCEDURE — 80048 BASIC METABOLIC PNL TOTAL CA: CPT | Performed by: INTERNAL MEDICINE

## 2023-11-09 PROCEDURE — 25000003 PHARM REV CODE 250: Performed by: NURSE PRACTITIONER

## 2023-11-09 PROCEDURE — 85025 COMPLETE CBC W/AUTO DIFF WBC: CPT | Performed by: INTERNAL MEDICINE

## 2023-11-09 RX ORDER — CILOSTAZOL 50 MG/1
50 TABLET ORAL 2 TIMES DAILY
Status: DISCONTINUED | OUTPATIENT
Start: 2023-11-09 | End: 2023-11-09 | Stop reason: HOSPADM

## 2023-11-09 RX ORDER — AMLODIPINE BESYLATE 5 MG/1
10 TABLET ORAL DAILY
Status: DISCONTINUED | OUTPATIENT
Start: 2023-11-09 | End: 2023-11-09 | Stop reason: HOSPADM

## 2023-11-09 RX ORDER — METOPROLOL TARTRATE 25 MG/1
25 TABLET, FILM COATED ORAL DAILY
Status: DISCONTINUED | OUTPATIENT
Start: 2023-11-09 | End: 2023-11-09 | Stop reason: HOSPADM

## 2023-11-09 RX ORDER — HYDROCHLOROTHIAZIDE 12.5 MG/1
12.5 TABLET ORAL DAILY
Status: DISCONTINUED | OUTPATIENT
Start: 2023-11-09 | End: 2023-11-09 | Stop reason: HOSPADM

## 2023-11-09 RX ORDER — LISINOPRIL 5 MG/1
5 TABLET ORAL DAILY
Status: DISCONTINUED | OUTPATIENT
Start: 2023-11-09 | End: 2023-11-09 | Stop reason: HOSPADM

## 2023-11-09 RX ADMIN — METOPROLOL TARTRATE 25 MG: 25 TABLET, FILM COATED ORAL at 10:11

## 2023-11-09 RX ADMIN — AMLODIPINE BESYLATE 10 MG: 5 TABLET ORAL at 10:11

## 2023-11-09 RX ADMIN — HYDROCHLOROTHIAZIDE 12.5 MG: 12.5 TABLET ORAL at 10:11

## 2023-11-09 RX ADMIN — ATORVASTATIN CALCIUM 40 MG: 40 TABLET, FILM COATED ORAL at 09:11

## 2023-11-09 RX ADMIN — CLOPIDOGREL BISULFATE 75 MG: 75 TABLET ORAL at 09:11

## 2023-11-09 RX ADMIN — ASPIRIN 81 MG: 81 TABLET, COATED ORAL at 09:11

## 2023-11-09 RX ADMIN — CILOSTAZOL 50 MG: 50 TABLET ORAL at 10:11

## 2023-11-09 NOTE — PLAN OF CARE
Problem: Adult Inpatient Plan of Care  Goal: Plan of Care Review  Outcome: Met  Goal: Patient-Specific Goal (Individualized)  Outcome: Met  Goal: Absence of Hospital-Acquired Illness or Injury  Outcome: Met  Goal: Optimal Comfort and Wellbeing  Outcome: Met  Goal: Readiness for Transition of Care  Outcome: Met     Problem: Fall Injury Risk  Goal: Absence of Fall and Fall-Related Injury  Outcome: Met     Problem: Skin Injury Risk Increased  Goal: Skin Health and Integrity  Outcome: Met     Problem: Impaired Wound Healing  Goal: Optimal Wound Healing  Outcome: Met

## 2023-11-09 NOTE — PLAN OF CARE
Spouse at bedside and will transport home today.  Pt states he is independent.  No CM needs noted.    11/09/23 1002   Discharge Assessment   Assessment Type Discharge Planning Assessment   Confirmed/corrected address, phone number and insurance Yes   Confirmed Demographics Correct on Facesheet   Source of Information patient;family   When was your last doctors appointment?   (September)   Communicated MEENA with patient/caregiver Yes   Reason For Admission femoral artery stenosis   People in Home spouse   Facility Arrived From: home   Do you expect to return to your current living situation? Yes   Do you have help at home or someone to help you manage your care at home? No   Prior to hospitilization cognitive status: Alert/Oriented   Current cognitive status: Alert/Oriented;No Deficits   Walking or Climbing Stairs   (independent)   Dressing/Bathing   (independent)   Home Accessibility stairs within home   Stairs, Within Home, Primary one flight.   Stair Railings, Within Home, Primary railings not in good condition, need repair for safe use   Stairs Comment, Within Home, Primary Stays on one level   Home Layout Able to live on 1st floor   Equipment Currently Used at Home none   Readmission within 30 days? Yes   Patient currently being followed by outpatient case management? No   Do you currently have service(s) that help you manage your care at home? No   Do you take prescription medications? Yes   Do you have prescription coverage? Yes   Coverage Part D.   Do you have any problems affording any of your prescribed medications? No  (possibly next year; Will use [employer] to help cover costs.)   Is the patient taking medications as prescribed? yes   How do you get to doctors appointments? car, drives self   Are you on dialysis? No   Do you take coumadin? No   DME Needed Upon Discharge  none   Discharge Plan discussed with: Patient   Transition of Care Barriers None   Discharge Plan A Home with family   Discharge Plan B  Home Health   Financial Resource Strain   How hard is it for you to pay for the very basics like food, housing, medical care, and heating? Not very   Housing Stability   In the last 12 months, how many places have you lived? 1   In the last 12 months, was there a time when you did not have a steady place to sleep or slept in a shelter (including now)? N   Transportation Needs   In the past 12 months, has lack of transportation kept you from medical appointments or from getting medications? no   In the past 12 months, has lack of transportation kept you from meetings, work, or from getting things needed for daily living? No   Food Insecurity   Within the past 12 months, you worried that your food would run out before you got the money to buy more. Never true   Within the past 12 months, the food you bought just didn't last and you didn't have money to get more. Never true   Stress   Do you feel stress - tense, restless, nervous, or anxious, or unable to sleep at night because your mind is troubled all the time - these days? Only a littl   Social Connections   Are you , , , , never , or living with a partner?

## 2023-11-09 NOTE — NURSING
Patient sites soft to touch, dressing clean, dry intact. Patient sitting up in bed. Patient being trasnferred to Room 980 by transport in stable condition.

## 2023-11-09 NOTE — NURSING
Nurses Note -- 4 Eyes      11/9/2023   4:40 AM      Skin assessed during: Admit      [] No Altered Skin Integrity Present    []Prevention Measures Documented      [] Yes- Altered Skin Integrity Present or Discovered   [x] LDA Added if Not in Epic (Describe Wound)   [] New Altered Skin Integrity was Present on Admit and Documented in LDA   [] Wound Image Taken    Wound Care Consulted? No    Attending Nurse:  Mallory Sanchez RN/Staff Member:   Lisa MORTON

## 2023-11-09 NOTE — DISCHARGE SUMMARY
[facility] - [facility] Medical Telemetry  Cardiology  Discharge Summary      Patient Name: Richard Healy  MRN: [id number]  Admission Date: 11/8/2023  Hospital Length of Stay: 0 days  Discharge Date and Time:  11/09/2023 8:21 AM  Attending Physician: Perry Garcia MD    Discharging Provider: KANE Hines  Primary Care Physician: Nick Suazo MD    HPI:   Mr. Healy presented for scheduled peripheral angiogram. Procedure as below. Mild ecchymosis to groin sites    Procedure(s) (LRB):  Peripheral angiography (N/A)  Angioplasty-peripheral (Bilateral)  Intravascular Ultrasound, Non-Coronary  Stent, Iliac Artery (Bilateral)   Left common iliac artery 80% stenosis   Right common iliac artery 70% stenosis  Bilateral SFA diffuse disease with 80-90% calcific stenosis    Successful balloon angioplasty stenting of bilateral common iliac artery using shockwave lithotripsy and covered [place] VBX stents    Physical Exam   HENT:   Mouth/Throat: Mucous membranes are moist.   Cardiovascular: Normal rate, regular rhythm, normal heart sounds and normal pulses. Pulmonary:      Effort: Pulmonary effort is normal.      Breath sounds: Normal breath sounds.     Abdominal: Soft.   Musculoskeletal:         General: Normal range of motion.   Neurological: He is alert.   Skin: Skin is warm. Capillary refill takes less than 2 seconds.   Psychiatric: Mood normal.        Significant Diagnostic Studies: Labs:   BMP:   Recent Labs   Lab 11/09/23  0439   *   K 3.6   CO2 23   BUN 15.8   CREATININE 0.80   CALCIUM 8.5*    and CBC   Recent Labs   Lab 11/09/23 0439   WBC 6.66   HGB 11.4*   HCT 33.8*   *     PAD with claudication  HTN  HLD  Tobacco dependence  Polycythemia vera    Plan:  Continue aspirin, statin, and plavix  Patient be followed up in the clinic and peripheral angiography/angioplasty will be performed for SFA via pedal access versus radial access in the future for Norman 3  claudication  Encouraged smoking cessation  Enroll in [facility] for PAD        Discharged Condition: stable    Disposition: Home or Self Care    Follow Up:   Follow-up Information       Rosalinda Fernández FNP Follow up on 11/15/2023.    Specialty: Family Medicine  Why: @ 2:15PM [facility] Location  Contact information:  [address] [facility] [address]  [phone]                           Patient Instructions:   No discharge procedures on file.  Medications:  Reconciled Home Medications:      Medication List        START taking these medications      clopidogreL 75 mg tablet  Commonly known as: PLAVIX  Take 1 tablet (75 mg total) by mouth once daily.            ASK your doctor about these medications      amLODIPine 10 MG tablet  Commonly known as: NORVASC  Take 10 mg by mouth once daily.     aspirin 81 MG EC tablet  Commonly known as: ECOTRIN  Take 81 mg by mouth once daily.     atorvastatin 40 MG tablet  Commonly known as: LIPITOR  Take 40 mg by mouth once daily.     BREZTRI AEROSPHERE 160-9-4.8 mcg/actuation Hf  Generic drug: budesonide-glycopyr-formoterol  Inhale 2 puffs into the lungs 2 (two) times a day.     cilostazoL 50 MG Tab  Commonly known as: PLETAL  Take 50 mg by mouth 2 (two) times daily.     hydroCHLOROthiazide 25 MG tablet  Commonly known as: HYDRODIURIL  Take 12.5 mg by mouth once daily.     * HYDROcodone-acetaminophen 7.5-325 mg per tablet  Commonly known as: NORCO  Take 1 tablet by mouth every 6 (six) hours as needed for Pain.     * HYDROcodone-acetaminophen 5-325 mg per tablet  Commonly known as: NORCO  Take 1 tablet by mouth every 6 (six) hours as needed.     hydroxyurea 500 mg Cap  Commonly known as: HYDREA  Take 2 capsules (1,000 mg total) by mouth once daily.     metoprolol tartrate 25 MG tablet  Commonly known as: LOPRESSOR  Take 25 mg by mouth once daily.     nicotine (polacrilex) 2 mg Gum  Commonly known as: NICORETTE  Take 2 mg by mouth as needed.     ONE DAILY MULTIVITAMIN per  tablet  Generic drug: multivitamin  Take 1 tablet by mouth once daily.     trandolapriL 4 MG Tab  Commonly known as: MAVIK  Take 8 mg by mouth once daily.           * This list has 2 medication(s) that are the same as other medications prescribed for you. Read the directions carefully, and ask your doctor or other care provider to review them with you.                  Time spent on the discharge of patient: 35 minutes    KANE Hines  Cardiology  [facility] - [facility] Medical Telemetry    I have reviewed the Nurse Practitioner's note, assessment and discharge plan. Medical decision making listed above were done under my guidance.

## 2023-11-14 ENCOUNTER — OFFICE VISIT (OUTPATIENT)
Dept: SURGICAL ONCOLOGY | Facility: CLINIC | Age: 68
End: 2023-11-14
Payer: MEDICARE

## 2023-11-14 DIAGNOSIS — E27.8 ADRENAL MASS: Primary | ICD-10-CM

## 2023-11-14 PROCEDURE — 99024 PR POST-OP FOLLOW-UP VISIT: ICD-10-PCS | Mod: POP,,, | Performed by: NURSE PRACTITIONER

## 2023-11-14 PROCEDURE — 99024 POSTOP FOLLOW-UP VISIT: CPT | Mod: POP,,, | Performed by: NURSE PRACTITIONER

## 2023-11-14 NOTE — PHYSICIAN QUERY
PT Name: Richard Healy  MR #: [id number]    DOCUMENTATION CLARIFICATION     CDS/:  Khari Torrez RN, CDS                   Contact Information:  [email or web address]  This form is a permanent document in the medical record.     Query Date: November 14, 2023    By submitting this query, we are merely seeking further clarification of documentation.  Please utilize your independent clinical judgment when addressing the question(s) below.    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      CODE 8      Note   Intradepartmental consultation case.  Pathology Report 10/27       Please clarify the pathology findings.    [ x ] Pathology findings noted above are ruled in/confirmed as diagnoses     [  ] Pathology findings noted above are not confirmed as diagnoses     [  ] Pathology findings noted above are incidental     [  ] Other diagnosis (please specify): ___________                [  ] Clinically Undetermined       Please document in your progress notes daily for the duration of treatment until resolved and include in your discharge summary.    Form No. [id number]

## 2023-11-14 NOTE — PROGRESS NOTES
POST OP LAP/DASH RIGHT ADRENALECTOMY  HX RIGHT ADRENAL MASS    PT DOING WELL  NO ABDOMINAL COMPLAINTS  EATING  HAVING BMS    SINCE SURGERY PT REPORTS HAD FEMPOP SURGERY  SEES DR ZUÑIGA  REPORTS SOME BRUISING AT SITE  SEEING MD TOMORROW    ABD SOFT  INCISIONS HEALED  NO SIGNS OF INFECTION    PATH: CAVERNOUS HEMANGIOMA , NO EVIDENCE OF MALIGNANCY  PATH DISCUSSED AND QUESTIONS ANSWERED    WIFE PRESENT  BOTH ARE VERY APPRECIATIVE     HE KNOWS TO CALL WITH ANY PROBLEMS    RTC PRN

## 2024-01-08 DIAGNOSIS — R41.3 MEMORY LOSS: Primary | ICD-10-CM

## 2024-01-25 ENCOUNTER — OFFICE VISIT (OUTPATIENT)
Dept: HEMATOLOGY/ONCOLOGY | Facility: CLINIC | Age: 69
End: 2024-01-25
Payer: MEDICARE

## 2024-01-25 VITALS
DIASTOLIC BLOOD PRESSURE: 64 MMHG | OXYGEN SATURATION: 97 % | RESPIRATION RATE: 14 BRPM | WEIGHT: 195.19 LBS | BODY MASS INDEX: 31.37 KG/M2 | TEMPERATURE: 98 F | HEIGHT: 66 IN | HEART RATE: 83 BPM | SYSTOLIC BLOOD PRESSURE: 98 MMHG

## 2024-01-25 DIAGNOSIS — D64.9 ANEMIA, UNSPECIFIED TYPE: ICD-10-CM

## 2024-01-25 DIAGNOSIS — D84.821 DRUG-INDUCED IMMUNODEFICIENCY: ICD-10-CM

## 2024-01-25 DIAGNOSIS — Z79.899 DRUG-INDUCED IMMUNODEFICIENCY: ICD-10-CM

## 2024-01-25 DIAGNOSIS — D50.9 IRON DEFICIENCY ANEMIA, UNSPECIFIED IRON DEFICIENCY ANEMIA TYPE: ICD-10-CM

## 2024-01-25 DIAGNOSIS — D45 POLYCYTHEMIA VERA: Primary | ICD-10-CM

## 2024-01-25 PROCEDURE — 99214 OFFICE O/P EST MOD 30 MIN: CPT | Mod: PBBFAC | Performed by: NURSE PRACTITIONER

## 2024-01-25 PROCEDURE — 99214 OFFICE O/P EST MOD 30 MIN: CPT | Mod: S$PBB,,, | Performed by: NURSE PRACTITIONER

## 2024-01-25 PROCEDURE — 99999 PR PBB SHADOW E&M-EST. PATIENT-LVL IV: CPT | Mod: PBBFAC,,, | Performed by: NURSE PRACTITIONER

## 2024-01-25 NOTE — PROGRESS NOTES
Subjective:       Patient ID: Richard Healy is a 68 y.o. male.    Chief Complaint:  Memory and personality issues    Diagnosis: ELLIS-2+ Polycythemia vera                    Recurrent iron deficiency anemia     Treatment History  Feraheme 3/21  Injectafer 4/22, 10/22, 3/23    Current Treatment: Hydrea 1000 mg daily; ASA 81 mg daily    Clinical History:  Patient was initially referred to Dr. Christensen in [place] in 2017 for an abnormal CBC. Platelet count was 739 and hemoglobin level was 18.6. He was started on Hydrea pending further workup. PCR for BCR-ABL was negative. Bone marrow 5/11/17 was hypercellular 70% with trilineage hematopoiesis and no significant dysplasia. Iron stores were absent. There was no significant fibrosis. 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 Following Dr. Christensen's half-way, care was transferred to Dr. Taylor. He had never undergone therapeutic phlebotomy. Beginning in July and September 2020, he began developing mild anemia with microcytic, hypochromic red cell indices. He also had progressive thrombocytosis. Iron profile 10/29/20 showed evidence of iron deficiency anemia with a serum ferritin level of 8.9. He was treated with an oral iron supplement but had progressive anemia with associated fatigue and progressive thrombocytopenia. He also had symptoms of pica for Cherry tomatoes and cucumbers. Follow-up testing 2/12/21 showed a hemoglobin of 11.1 and hematocrit 38.2. WBC was 9.2 and platelet count had increased to 739. Serum iron was 20, TIBC 488, saturation 4% and ferritin level 5.6 ng/mL. B12 and folic acid levels were normal. He was treated with IV Feraheme 510 mg x 2 doses 3/21 with symptomatic improvement.    He transferred his care to [facility] 3/10/21 due to the relocation of Dr. Taylor. Counts improved following IV iron therapy.  Hydrea was resumed 4/1/21 at dose of 500 mg alternating with 1000 mg daily.  He developed  recurrent iron deficiency anemia with a hemoglobin of 11.4 and serum ferritin level 14.6 ng/mL.  His WBC and platelet counts were normal on Hydrea.  He had recurrent PICA symptoms for cherry tomatoes.  Was treated with a single dose of Injectafer 750 mg on 04/11/22.  His energy level improved following treatment.    EGD and colonoscopy 2022 was remarkable only for internal hemorrhoids.  Required re-treatment with IV Injectafer x 2 doses 10/22 for recurrent iron deficiency anemia with a hemoglobin of 11.8 and serum ferritin level of 17 ng/mL.  He had mild PICA symptoms for cherry tomatoes and cucumbers.  He was treated with IV Injectafer 323 with improvement in his blood counts.  His bleeding stopped after he underwent hemorrhoidal banding.    Interval History  Mr. Healy is here today for a three-month hematology follow-up visit accompanied by his wife.  Since his last visit, he underwent a laparoscopic right adrenalectomy 10/27/23.  Pathology showed a cavernous hemangioma.  He also underwent a peripheral angiogram 11/09/2023.  He is currently undergoing a workup by his primary care provider for personality and mental status changes.  He has been referred to neurology and that appointment is in May.  His wife is asking about the possibility of his symptoms being related to his Hydrea.  His symptoms have been present for many years and progressing.  CBC in office today shows stable counts with a WBC of 4.4, hemoglobin 12.8, platelets [id number].  Iron studies are pending.  Results reviewed and discussed with the patient and his wife.     Review of Systems   Constitutional:  Negative for activity change, fatigue, fever and unexpected weight change.   HENT:  Negative for nasal congestion, hearing loss, mouth sores, sore throat and trouble swallowing.    Eyes: Negative.    Respiratory:  Negative for cough, shortness of breath and wheezing.    Cardiovascular:  Negative for chest pain, palpitations, leg swelling and  "claudication.   Gastrointestinal:  Negative for abdominal distention, abdominal pain, constipation, diarrhea and nausea.   Endocrine: Negative.    Genitourinary:  Negative for dysuria, frequency, hematuria and urgency.   Musculoskeletal:  Positive for neck pain (Chronic). Negative for arthralgias and back pain.   Integumentary:  Negative for rash and mole/lesion.        No pruritus   Allergic/Immunologic: Negative.    Neurological:  Negative for dizziness, weakness, numbness and headaches.   Hematological:  Negative for adenopathy. Does not bruise/bleed easily.   Psychiatric/Behavioral:  Positive for behavioral problems and confusion.        PMHx:  HTN, hyperlipidemia, COPD  PSHx:  Tonsils, appendectomy, hemorrhoids, R foot lipoma, bone marrow, colonoscopy, umbilical hernia repair 5/23, hemorrhoidal banding 3/23  SH:  + Tobacco 1.5 PPD > 40 years. + Social alcohol use. Lives in [place] with his wife, retired [employer].  FH:  Maternal grandfather had cancer of unknown type. No family history of blood disorders.     Objective:        BP 98/64 (BP Location: Right arm, Patient Position: Sitting)   Pulse 83   Temp 97.8 °F (36.6 °C) (Oral)   Resp 14   Ht 5' 6" (1.676 m)   Wt 88.5 kg (195 lb 3.2 oz)   SpO2 97%   BMI 31.51 kg/m²    Physical Exam  Constitutional:       Comments: Mildly obese white male in NAD   HENT:      Head: Normocephalic.      Mouth/Throat:      Mouth: Mucous membranes are moist.      Pharynx: Oropharynx is clear. No posterior oropharyngeal erythema.   Eyes:      Extraocular Movements: Extraocular movements intact.      Conjunctiva/sclera: Conjunctivae normal.      Pupils: Pupils are equal, round, and reactive to light.   Cardiovascular:      Rate and Rhythm: Normal rate and regular rhythm.      Heart sounds: No murmur heard.  Abdominal:      General: Bowel sounds are normal. There is no distension.      Palpations: Abdomen is soft. There is no mass.      Tenderness: There is no " abdominal tenderness.      Comments: Obese, No splenomegaly   Musculoskeletal:         General: No swelling or tenderness. Normal range of motion.      Cervical back: Neck supple. No tenderness.   Skin:     General: Skin is warm and dry.      Findings: No rash.   Neurological:      General: No focal deficit present.      Mental Status: He is alert and oriented to person, place, and time.      Cranial Nerves: No cranial nerve deficit.      Motor: No weakness.          LABORATORY  Recent Results (from the past 168 hour(s))   CBC with Differential    Collection Time: 01/25/24  1:30 PM   Result Value Ref Range    WBC 4.45 (L) 4.50 - 11.50 x10(3)/mcL    RBC 3.98 (L) 4.70 - 6.10 x10(6)/mcL    Hgb 12.8 (L) 14.0 - 18.0 g/dL    Hct 40.0 (L) 42.0 - 52.0 %    .5 (H) 80.0 - 94.0 fL    MCH 32.2 (H) 27.0 - 31.0 pg    MCHC 32.0 (L) 33.0 - 36.0 g/dL    RDW 14.6 11.5 - 17.0 %    Platelet 364 130 - 400 x10(3)/mcL    MPV 8.9 7.4 - 10.4 fL    Neut % 66.4 %    Lymph % 22.0 %    Mono % 7.4 %    Eos % 3.6 %    Basophil % 0.4 %    Lymph # 0.98 0.6 - 4.6 x10(3)/mcL    Neut # 2.95 2.1 - 9.2 x10(3)/mcL    Mono # 0.33 0.1 - 1.3 x10(3)/mcL    Eos # 0.16 0 - 0.9 x10(3)/mcL    Baso # 0.02 <=0.2 x10(3)/mcL    IG# 0.01 0 - 0.04 x10(3)/mcL    IG% 0.2 %          Assessment:   ELLIS-2+ Polcythemia vera  Recurrent iron deficiency anemia    Plan:   Blood counts have remained well controlled on Hydrea 1000 mg daily.    There is recent question about the possibility of his Hydrea contributing to his mental status changes.    Although unlikely, it is reasonable to consider a brief drug holiday.    The patient and his wife are requesting to hold treatment for 2-3 weeks to see if his symptoms improve.  This is reasonable.    We will arrange virtual/phone only follow-up in 3-4 weeks to assess the outcome of the treatment break.    If his neurologic symptoms are unchanged, he will resume the Hydrea.    Continue aspirin 81 mg daily for thrombosis  prophylaxis.  Patient and wife are in agreement with the treatment plan.  All questions answered.    GORDON Santo, FNP-C    Other Physicians  Dr. Nick Garcia

## 2024-02-20 NOTE — PROGRESS NOTES
Subjective:       Patient ID: Richard Healy is a 68 y.o. male.    Chief Complaint:  I am doing better    Diagnosis: ELLIS-2+ Polycythemia vera                    Recurrent iron deficiency anemia     Treatment History  Feraheme 3/21  Injectafer 4/22, 10/22, 3/23    Current Treatment: Hydrea 1000 mg daily; ASA 81 mg daily    Clinical History:  Patient was initially referred to Dr. Christensen in [place] in 2017 for an abnormal CBC. Platelet count was 739 and hemoglobin level was 18.6. He was started on Hydrea pending further workup. PCR for BCR-ABL was negative. Bone marrow 5/11/17 was hypercellular 70% with trilineage hematopoiesis and no significant dysplasia. Iron stores were absent. There was no significant fibrosis. 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 Following Dr. Christensen's residential, care was transferred to Dr. Taylor. He had never undergone therapeutic phlebotomy. Beginning in July and September 2020, he began developing mild anemia with microcytic, hypochromic red cell indices. He also had progressive thrombocytosis. Iron profile 10/29/20 showed evidence of iron deficiency anemia with a serum ferritin level of 8.9. He was treated with an oral iron supplement but had progressive anemia with associated fatigue and progressive thrombocytopenia. He also had symptoms of pica for Cherry tomatoes and cucumbers. Follow-up testing 2/12/21 showed a hemoglobin of 11.1 and hematocrit 38.2. WBC was 9.2 and platelet count had increased to 739. Serum iron was 20, TIBC 488, saturation 4% and ferritin level 5.6 ng/mL. B12 and folic acid levels were normal. He was treated with IV Feraheme 510 mg x 2 doses 3/21 with symptomatic improvement.    He transferred his care to [facility] 3/10/21 due to the relocation of Dr. Taylor. Counts improved following IV iron therapy.  Hydrea was resumed 4/1/21 at dose of 500 mg alternating with 1000 mg daily.  He developed recurrent iron  deficiency anemia with a hemoglobin of 11.4 and serum ferritin level 14.6 ng/mL.  His WBC and platelet counts were normal on Hydrea.  He had recurrent PICA symptoms for cherry tomatoes.  Was treated with a single dose of Injectafer 750 mg on 04/11/22.  His energy level improved following treatment.    EGD and colonoscopy 2022 was remarkable only for internal hemorrhoids.  Required re-treatment with IV Injectafer x 2 doses 10/22 for recurrent iron deficiency anemia with a hemoglobin of 11.8 and serum ferritin level of 17 ng/mL.  He had mild PICA symptoms for cherry tomatoes and cucumbers.  He was treated with IV Injectafer 323 with improvement in his blood counts.  His bleeding stopped after he underwent hemorrhoidal banding.    He underwent a laparoscopic right adrenalectomy 10/27/23.  Pathology showed a cavernous hemangioma.  He also underwent a peripheral angiogram 11/09/2023.    Interval History  A virtual (phone only) visit was conducted today with Mr. Healy in follow-up to his most recent office appointment.  He was attended by his wife.  He consented to the appointment.  At the time of his last visit, he complained of progressive personality and mental status changes.  He was awaiting evaluation by Neurology.  He requested a treatment break from the Hydrea in the event it was contributing to his symptoms.  He held his Hydrea for approximately 2 weeks and did feel that his personality and mental status changes were better.  He eventually resumed the medication due to facial erythema and swelling.  His facial symptoms resolved back on treatment.  He is now taking the Hydrea at night and thinks he is doing much better.  He is scheduled to return for routine follow-up with Dr. Sanders with laboratory in April.    Review of Systems   Constitutional:  Negative for activity change, fatigue, fever and unexpected weight change.   HENT:  Negative for nasal congestion, hearing loss, mouth sores, sore throat and trouble  swallowing.    Eyes: Negative.    Respiratory:  Negative for cough, shortness of breath and wheezing.    Cardiovascular:  Negative for chest pain, palpitations, leg swelling and claudication.   Gastrointestinal:  Negative for abdominal distention, abdominal pain, constipation, diarrhea and nausea.   Endocrine: Negative.    Genitourinary:  Negative for dysuria, frequency, hematuria and urgency.   Musculoskeletal:  Positive for neck pain (Chronic). Negative for arthralgias and back pain.   Integumentary:  Negative for rash and mole/lesion.        No pruritus   Allergic/Immunologic: Negative.    Neurological:  Negative for dizziness, weakness, numbness and headaches.   Hematological:  Negative for adenopathy. Does not bruise/bleed easily.   Psychiatric/Behavioral:  Positive for behavioral problems and confusion.         Symptoms improved       PMHx:  HTN, hyperlipidemia, COPD  PSHx:  Tonsils, appendectomy, hemorrhoids, R foot lipoma, bone marrow, colonoscopy, umbilical hernia repair 5/23, hemorrhoidal banding 3/23  SH:  + Tobacco 1.5 PPD > 40 years. + Social alcohol use. 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  FH:  Maternal grandfather had cancer of unknown type. No family history of blood disorders.     Objective:        There were no vitals taken for this visit.   Physical Exam  Neurological:      Mental Status: He is oriented to person, place, and time.        Unable to perform complete exam secondary to virtual phone only visit.    LABORATORY  No results found for this or any previous visit (from the past 168 hour(s)).         Assessment:   ELLIS-2+ Polcythemia vera  Recurrent iron deficiency anemia    Plan:   Continue Hydrea 1000 mg daily at bedtime.    Patient feels that his psychological and neurological symptoms are better and he is willing to continue his treatment at the current dose and schedule.    He is scheduled for neurology consultation in early May.    Keep follow-up here at  [facility] in April.  Will repeat CBC and iron studies at that time.    We will consider IV iron replacement if recurrent iron deficiency is documented.  Continue aspirin 81 mg daily for thrombosis prophylaxis.  Patient and wife are in agreement with the treatment plan.  All questions answered.    GORDON Santo, FNP-C    Other Physicians  Dr. Nick Garcia    Established Patient - Audio Only Telehealth Visit     The patient location is: [place], [place]  The chief complaint leading to consultation is: Medication side effect check  Visit type: Virtual visit with audio only (telephone)  Total time spent with patient: 6.5 minutes       The reason for the audio only service rather than synchronous audio and video virtual visit was related to technical difficulties or patient preference/necessity.     Each patient to whom I provide medical services by telemedicine is:  (1) informed of the relationship between the physician and patient and the respective role of any other health care provider with respect to management of the patient; and (2) notified that they may decline to receive medical services by telemedicine and may withdraw from such care at any time. Patient verbally consented to receive this service via voice-only telephone call.         This service was not originating from a related E/M service provided within the previous 7 days nor will  to an E/M service or procedure within the next 24 hours or my soonest available appointment.  Prevailing standard of care was able to be met in this audio-only visit.

## 2024-02-21 ENCOUNTER — OFFICE VISIT (OUTPATIENT)
Dept: HEMATOLOGY/ONCOLOGY | Facility: CLINIC | Age: 69
End: 2024-02-21
Payer: MEDICARE

## 2024-02-21 DIAGNOSIS — D45 POLYCYTHEMIA VERA: Primary | ICD-10-CM

## 2024-02-21 PROCEDURE — 99441 PR PHYSICIAN TELEPHONE EVALUATION 5-10 MIN: CPT | Mod: 95,,, | Performed by: NURSE PRACTITIONER

## 2024-03-19 DIAGNOSIS — D45 POLYCYTHEMIA VERA: ICD-10-CM

## 2024-03-19 RX ORDER — HYDROXYUREA 500 MG/1
1000 CAPSULE ORAL DAILY
Qty: 60 CAPSULE | Refills: 5 | Status: SHIPPED | OUTPATIENT
Start: 2024-03-19 | End: 2025-03-19

## 2024-04-23 ENCOUNTER — PATIENT MESSAGE (OUTPATIENT)
Dept: NEUROLOGY | Facility: CLINIC | Age: 69
End: 2024-04-23
Payer: MEDICARE

## 2024-04-25 NOTE — PROGRESS NOTES
Subjective:       Patient ID: Richard Healy is a 68 y.o. male.    Chief Complaint:  I have a broken heel    Diagnosis: ELLIS-2+ Polycythemia vera                    Recurrent iron deficiency anemia     Treatment History  Feraheme 3/21  Injectafer 4/22, 10/22, 3/23    Current Treatment: Hydrea 1000 mg daily; ASA 81 mg daily    Clinical History:  Patient was initially referred to Dr. Christensen in [place] in 2017 for an abnormal CBC. Platelet count was 739 and hemoglobin level was 18.6. He was started on Hydrea pending further workup. PCR for BCR-ABL was negative. Bone marrow 5/11/17 was hypercellular 70% with trilineage hematopoiesis and no significant dysplasia. Iron stores were absent. There was no significant fibrosis. 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 Following Dr. Christensen's assisted, care was transferred to Dr. Taylor. He had never undergone therapeutic phlebotomy. Beginning in July and September 2020, he began developing mild anemia with microcytic, hypochromic red cell indices. He also had progressive thrombocytosis. Iron profile 10/29/20 showed evidence of iron deficiency anemia with a serum ferritin level of 8.9. He was treated with an oral iron supplement but had progressive anemia with associated fatigue and progressive thrombocytopenia. He also had symptoms of pica for Cherry tomatoes and cucumbers. Follow-up testing 2/12/21 showed a hemoglobin of 11.1 and hematocrit 38.2. WBC was 9.2 and platelet count had increased to 739. Serum iron was 20, TIBC 488, saturation 4% and ferritin level 5.6 ng/mL. B12 and folic acid levels were normal. He was treated with IV Feraheme 510 mg x 2 doses 3/21 with symptomatic improvement.    He transferred his care to [facility] 3/10/21 due to the relocation of Dr. Taylor. Counts improved following IV iron therapy.  Hydrea was resumed 4/1/21 at dose of 500 mg alternating with 1000 mg daily.  He developed recurrent iron  deficiency anemia with a hemoglobin of 11.4 and serum ferritin level 14.6 ng/mL.  His WBC and platelet counts were normal on Hydrea.  He had recurrent PICA symptoms for cherry tomatoes.  Was treated with a single dose of Injectafer 750 mg on 04/11/22.  His energy level improved following treatment.    EGD and colonoscopy 2022 was remarkable only for internal hemorrhoids.  Required re-treatment with IV Injectafer x 2 doses 10/22 for recurrent iron deficiency anemia with a hemoglobin of 11.8 and serum ferritin level of 17 ng/mL.  He had mild PICA symptoms for cherry tomatoes and cucumbers.  He was treated with IV Injectafer 3/23 with improvement in his blood counts.  His bleeding stopped after he underwent hemorrhoidal banding.  He underwent a right adrenalectomy 10/27/23 with pathology showing a cavernous hemangioma.    Interval History  He returns to the office today for a two-month follow-up visit accompanied by his wife.  He sustained a fracture involving his left heel after a fall at home.  He is currently nonweightbearing in a boot pending further orthopedic follow-up.  He returns for follow-up x-rays this week.  His pain has improved.  He remains on Hydrea 1000 mg daily.  His anemia shows complete recovery.  He is not taking any supplemental iron.  Follow-up ferritin level drawn today is pending.  His platelet count is mildly elevated but possibly related to his recent heel fracture.  WBC count is normal.      Review of Systems   Constitutional:  Negative for activity change, fatigue, fever and unexpected weight change.   HENT:  Negative for hearing loss, mouth sores, sore throat and trouble swallowing.    Eyes: Negative.    Respiratory:  Negative for cough, shortness of breath and wheezing.    Cardiovascular:  Negative for chest pain, palpitations and leg swelling.   Gastrointestinal:  Negative for abdominal distention, abdominal pain, constipation, diarrhea and nausea.   Genitourinary:  Negative for dysuria,  frequency, hematuria and urgency.   Musculoskeletal:  Positive for neck pain (Chronic). Negative for arthralgias and back pain.        Left heel pain   Integumentary:  Negative for pallor and rash.        No pruritus   Neurological:  Negative for dizziness, weakness, numbness and headaches.   Hematological:  Negative for adenopathy. Does not bruise/bleed easily.   Psychiatric/Behavioral: Negative.         PMHx:  HTN, hyperlipidemia, COPD  PSHx:  Tonsils, appendectomy, hemorrhoids, R foot lipoma, bone marrow, colonoscopy, umbilical hernia repair 5/23, hemorrhoidal banding 3/23  SH:  + Tobacco 1.5 PPD > 40 years. + Social alcohol use. 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  FH:  Maternal grandfather had cancer of unknown type. No family history of blood disorders.     Objective:        /75   Pulse 61   Temp 98 °F (36.7 °C)   Resp 14   SpO2 97%    Physical Exam  Constitutional:       Comments: Mildly obese white male in NAD   HENT:      Head: Normocephalic.      Mouth/Throat:      Mouth: Mucous membranes are moist.      Pharynx: Oropharynx is clear. No posterior oropharyngeal erythema.   Eyes:      Extraocular Movements: Extraocular movements intact.      Conjunctiva/sclera: Conjunctivae normal.      Pupils: Pupils are equal, round, and reactive to light.   Cardiovascular:      Rate and Rhythm: Normal rate and regular rhythm.      Heart sounds: No murmur heard.  Abdominal:      General: Bowel sounds are normal. There is no distension.      Palpations: Abdomen is soft. There is no mass.      Tenderness: There is no abdominal tenderness.      Comments: Obese, No splenomegaly   Musculoskeletal:         General: No swelling or tenderness.      Cervical back: Neck supple. No tenderness.      Comments: LLE boot in place   Skin:     General: Skin is warm and dry.      Findings: No rash.   Neurological:      General: No focal deficit present.      Mental Status: He is alert and oriented to  person, place, and time.      Cranial Nerves: No cranial nerve deficit.      Motor: No weakness.          LABORATORY  Recent Results (from the past 168 hour(s))   CBC with Differential    Collection Time: 04/29/24 10:22 AM   Result Value Ref Range    WBC 6.14 4.50 - 11.50 x10(3)/mcL    RBC 4.58 (L) 4.70 - 6.10 x10(6)/mcL    Hgb 14.2 14.0 - 18.0 g/dL    Hct 42.7 42.0 - 52.0 %    MCV 93.2 80.0 - 94.0 fL    MCH 31.0 27.0 - 31.0 pg    MCHC 33.3 33.0 - 36.0 g/dL    RDW 19.3 (H) 11.5 - 17.0 %    Platelet 469 (H) 130 - 400 x10(3)/mcL    MPV 8.7 7.4 - 10.4 fL    Neut % 73.5 %    Lymph % 14.7 %    Mono % 8.5 %    Eos % 2.3 %    Basophil % 0.5 %    Lymph # 0.90 0.6 - 4.6 x10(3)/mcL    Neut # 4.52 2.1 - 9.2 x10(3)/mcL    Mono # 0.52 0.1 - 1.3 x10(3)/mcL    Eos # 0.14 0 - 0.9 x10(3)/mcL    Baso # 0.03 <=0.2 x10(3)/mcL    IG# 0.03 0 - 0.04 x10(3)/mcL    IG% 0.5 %       Assessment:   ELLIS-2+ Polcythemia vera  Recurrent iron deficiency anemia - resolved    Plan:   H&H today are normal.  Previous iron deficiency has resolved.    Serum ferritin level drawn today is pending.  Continue Hydrea 1000 mg daily.  Other counts well controlled.  RTC in 3 months for a follow-up visit with repeat laboratory.      ISRA KOHLER MD    Other Physicians  Dr. Nick Garcia

## 2024-04-29 ENCOUNTER — OFFICE VISIT (OUTPATIENT)
Dept: HEMATOLOGY/ONCOLOGY | Facility: CLINIC | Age: 69
End: 2024-04-29
Payer: MEDICARE

## 2024-04-29 VITALS
DIASTOLIC BLOOD PRESSURE: 75 MMHG | RESPIRATION RATE: 14 BRPM | HEART RATE: 61 BPM | SYSTOLIC BLOOD PRESSURE: 111 MMHG | TEMPERATURE: 98 F | OXYGEN SATURATION: 97 %

## 2024-04-29 DIAGNOSIS — D45 POLYCYTHEMIA VERA: Primary | ICD-10-CM

## 2024-04-29 PROCEDURE — 99214 OFFICE O/P EST MOD 30 MIN: CPT | Mod: S$PBB,,, | Performed by: INTERNAL MEDICINE

## 2024-04-29 PROCEDURE — 99999 PR PBB SHADOW E&M-EST. PATIENT-LVL III: CPT | Mod: PBBFAC,,, | Performed by: INTERNAL MEDICINE

## 2024-04-29 PROCEDURE — 99213 OFFICE O/P EST LOW 20 MIN: CPT | Mod: PBBFAC | Performed by: INTERNAL MEDICINE

## 2024-05-08 ENCOUNTER — TELEPHONE (OUTPATIENT)
Dept: SLEEP MEDICINE | Facility: HOSPITAL | Age: 69
End: 2024-05-08
Payer: MEDICARE

## 2024-05-08 ENCOUNTER — OFFICE VISIT (OUTPATIENT)
Dept: NEUROLOGY | Facility: CLINIC | Age: 69
End: 2024-05-08
Payer: MEDICARE

## 2024-05-08 VITALS
WEIGHT: 195 LBS | SYSTOLIC BLOOD PRESSURE: 142 MMHG | HEIGHT: 65 IN | DIASTOLIC BLOOD PRESSURE: 92 MMHG | BODY MASS INDEX: 32.49 KG/M2

## 2024-05-08 DIAGNOSIS — R46.89 BEHAVIORAL CHANGE: ICD-10-CM

## 2024-05-08 DIAGNOSIS — R40.4 EPISODIC ALTERED AWARENESS: ICD-10-CM

## 2024-05-08 DIAGNOSIS — R41.3 MEMORY LOSS: Primary | ICD-10-CM

## 2024-05-08 DIAGNOSIS — R40.4 EPISODIC ALTERED AWARENESS: Primary | ICD-10-CM

## 2024-05-08 DIAGNOSIS — R41.3 MEMORY LOSS: ICD-10-CM

## 2024-05-08 PROCEDURE — 99213 OFFICE O/P EST LOW 20 MIN: CPT | Mod: PBBFAC | Performed by: SPECIALIST

## 2024-05-08 PROCEDURE — 99999 PR PBB SHADOW E&M-EST. PATIENT-LVL III: CPT | Mod: PBBFAC,,, | Performed by: SPECIALIST

## 2024-05-08 PROCEDURE — 99205 OFFICE O/P NEW HI 60 MIN: CPT | Mod: S$PBB,,, | Performed by: SPECIALIST

## 2024-05-08 RX ORDER — ALBUTEROL SULFATE 90 UG/1
AEROSOL, METERED RESPIRATORY (INHALATION)
COMMUNITY
Start: 2024-02-06

## 2024-05-08 NOTE — PROGRESS NOTES
"Subjective:      @Patient ID: Richard Healy is a 68 y.o. male.    Chief Complaint/referral reason/HPI:   Chief Complaint   Patient presents with    NP ref by Dr Suazo for neuro cons to [facility].       MRI Brain done last year. No Hx head injury. Decline in memory for abt 1 yr.Forgets dates, conversations and forgot his lawnmower was out of diesel. Does not repeat himself. Trouble focusing and off balance w a fall and broke his heel abt 4 wks ago.  Pts wife is here today.       Arianna RAY hx comments:  Wife wonders if his behavior being off related to hydroxyurea but he got off and it wasn't clear if was from the med     See also 'facesheet' under media for handwritten notes     Review of Systems         Marital status:  m    -------------------------------------    COPD (chronic obstructive pulmonary disease)    Emphysema, unspecified    Fatigue    Hyperlipidemia    Hypertension    Iron deficiency anemia    Obesity    PAD (peripheral artery disease)    Polycythemia vera ELLIS-2+    Right adrenal mass    SOB (shortness of breath) on exertion     ..  Current Outpatient Medications   Medication Instructions    albuterol (PROVENTIL/VENTOLIN HFA) 90 mcg/actuation inhaler Inhale into the lungs.    amLODIPine (NORVASC) 10 mg, Oral, Daily    aspirin (ECOTRIN) 81 mg, Oral, Daily    atorvastatin (LIPITOR) 40 mg, Oral, Daily    BREZTRI AEROSPHERE 160-9-4.8 mcg/actuation HFAA 2 puffs, 2 times daily    clopidogreL (PLAVIX) 75 mg, Oral, Daily    hydroCHLOROthiazide (HYDRODIURIL) 12.5 mg, Oral, Daily    hydroxyurea (HYDREA) 1,000 mg, Oral, Daily    metoprolol tartrate (LOPRESSOR) 25 mg, Oral, Daily    multivitamin (ONE DAILY MULTIVITAMIN) per tablet 1 tablet, Oral, Daily    nicotine (polacrilex) (NICORETTE) 2 mg, Oral, As needed (PRN)    trandolapriL (MAVIK) 8 mg, Oral, Daily      Objective:      Exam:   Visit Vitals  BP (!) 142/92   Ht 5' 5" (1.651 m)   Wt 88.5 kg (195 lb)   BMI 32.45 kg/m²     General Exam  If " Accompanied, by__       body habitus_ Body mass index is 32.45 kg/m².  Gen exam     Neurological:  []Normal neuro exam         Or   Exam comments: L boot   RRR   VF EOMG's ok   No lateraliz motor or coord deficits and not parkinsonian     []MMSE; if done:        5/8/2024    10:23 AM   MMSE   Question 1 Score (MMSE) 3   Question 2 Score (MMSE) 5   Question 3 Score (MMSE) 3   Question 4 Score (MMSE) 5   Question 5 Score (MMSE) 2   Question 6 Score (MMSE) 2   Question 7 Score (MMSE) 1   Question 8 Score (MMSE) 2   Question 9 Score (MMSE) 1   Question 10 Score (MMSE) 1   Question 11 Score (MMSE) 1   Total Score (MMSE) 26        Neuroimaging:  [x] Images  reviewed. Rads summary:  My comments: mild global and brainstem atrophy without lateralization     Labs:    [x]  New Patient         [x]  Multiple Issues/ diagnoses or problems  [if not enumerated in note then discussed but not documented]    Complexity of Data:   [x] High    [] Moderate   [x] Images and reports reviewed [x] History obtained from accompaniment  [x] Studies ordered [] Studies consid or discussed, not ordered   [] Differential Diagnoses discussed       Risks:   [x] High     [] Moderate   [] (poss or def) neurodegenerative condition [] () autoimmune condition with possibility of flares or unexpected attack  [] () seiz d.o. with possib of recurr seiz's  [] Cerebrovasc ds with risk of recurrent stroke  [] CNS meds (and/or) potentially high risk non CNS meds taken or discussed which may cause med or behav SE's  [x] Fall risk [x] Driving discussed  [] Diagnosis unclear or DDx wide making risk uncertain   []:    MDM:    [x] High     [] Moderate       Assessment/Plan:         ICD-10-CM ICD-9-CM   1. Episodic altered awareness  R40.4 780.02   2. Memory loss  R41.3 780.93   3. Behavioral change  R46.89 312.9   May have LUZ but he does not think he'd tolerate PAP   he tries his wife's and could not tolerate it       Other Comments / Follow Up:            Orders  Placed This Encounter   Procedures    Ambulatory EEG      Patient Instructions    Aim virtual follow up one month       MD JEROME DonA FAAN, [facility]  Neuro[facility] Center Medical Director   [facility]

## 2024-05-15 ENCOUNTER — PROCEDURE VISIT (OUTPATIENT)
Dept: SLEEP MEDICINE | Facility: HOSPITAL | Age: 69
End: 2024-05-15
Attending: SPECIALIST
Payer: MEDICARE

## 2024-05-15 DIAGNOSIS — R40.4 EPISODIC ALTERED AWARENESS: ICD-10-CM

## 2024-05-15 DIAGNOSIS — R41.3 MEMORY LOSS: ICD-10-CM

## 2024-05-15 DIAGNOSIS — R46.89 BEHAVIORAL CHANGE: ICD-10-CM

## 2024-05-15 PROCEDURE — 95819 EEG AWAKE AND ASLEEP: CPT

## 2024-05-15 PROCEDURE — 95816 EEG AWAKE AND DROWSY: CPT | Mod: 26,,, | Performed by: SPECIALIST

## 2024-05-17 NOTE — PROCEDURES
EEG REPORT    PATIENT: Richard Healy  : 1955     Encounter Diagnoses   Name Primary?    Episodic altered awareness     Behavioral change     Memory loss        INTERPRETING PHYSICIAN: Margarito Noe     Reason for EEG:  encephalopathy       Digital EEG reviewed.  Electrodes placed in customary 10-20 fashion.  Video recorded:  yes  []no  Duration of recording:     __31_minutes   Patient    Awake    Asleep.  The resting posterior background consists of symmetrical alpha frequencies.  Lateralizing, focal, or epileptiform features were not present.  Activations:   photic   performed and not associated with abnormalities.    []Activations not performed.   Technical character: Good    EKG: NSR w/ PAC's     IMPRESSION: This is a normal routine EEG.     Comments:   The lack of focal or epileptiform features does not negate or exclude a diagnosis of seizure or epilepsy, as the sensitivity of interictal EEG may be < or equal to 50%.    MD JEROME DonA

## 2024-07-31 NOTE — PROGRESS NOTES
Subjective:       Patient ID: Richard Healy is a 68 y.o. male.    Chief Complaint:  No new problems    Diagnosis: ELLIS-2+ Polycythemia vera                    Recurrent iron deficiency anemia     Treatment History  Feraheme 3/21  Injectafer 4/22, 10/22, 3/23    Current Treatment: Hydrea 1000 mg daily; ASA 81 mg daily    Clinical History:  Patient was initially referred to Dr. Christensen in [place] in 2017 for an abnormal CBC. Platelet count was 739 and hemoglobin level was 18.6. He was started on Hydrea pending further workup. PCR for BCR-ABL was negative. Bone marrow 5/11/17 was hypercellular 70% with trilineage hematopoiesis and no significant dysplasia. Iron stores were absent. There was no significant fibrosis. 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 Following Dr. Christensen's jail, care was transferred to Dr. Taylor. He had never undergone therapeutic phlebotomy. Beginning in July and September 2020, he began developing mild anemia with microcytic, hypochromic red cell indices. He also had progressive thrombocytosis. Iron profile 10/29/20 showed evidence of iron deficiency anemia with a serum ferritin level of 8.9. He was treated with an oral iron supplement but had progressive anemia with associated fatigue and progressive thrombocytopenia. He also had symptoms of pica for Cherry tomatoes and cucumbers. Follow-up testing 2/12/21 showed a hemoglobin of 11.1 and hematocrit 38.2. WBC was 9.2 and platelet count had increased to 739. Serum iron was 20, TIBC 488, saturation 4% and ferritin level 5.6 ng/mL. B12 and folic acid levels were normal. He was treated with IV Feraheme 510 mg x 2 doses 3/21 with symptomatic improvement.    He transferred [facility] to [facility] 3[facility]10[facility]21 due to the relocation of Dr. Taylor. Counts improved following IV iron therapy.  Hydrea was resumed 4/1/21 at dose of 500 mg alternating with 1000 mg daily.  He developed recurrent iron  deficiency anemia with a hemoglobin of 11.4 and serum ferritin level 14.6 ng/mL.  His WBC and platelet counts were normal on Hydrea.  He had recurrent PICA symptoms for cherry tomatoes.  Was treated with a single dose of Injectafer 750 mg on 04/11/22.  His energy level improved following treatment.    EGD and colonoscopy 2022 was remarkable only for internal hemorrhoids.  Required re-treatment with IV Injectafer x 2 doses 10/22 for recurrent iron deficiency anemia with a hemoglobin of 11.8 and serum ferritin level of 17 ng/mL.  He had mild PICA symptoms for cherry tomatoes and cucumbers.  He was treated with IV Injectafer 3/23 with improvement in his blood counts.  His bleeding stopped after he underwent hemorrhoidal banding.  He underwent a right adrenalectomy 10/27/23 with pathology showing a cavernous hemangioma.    Interval History  He returns to the office today for a three-month follow-up visit accompanied by his wife.  He is ambulatory without assistance.  He continues on Hydrea 1000 mg daily.  His wife feels that the medication does affect his cognitive state, but previous drug holiday resulted in no significant change. He has undergone Neurology workup with no acute findings. She monitors his mediations to ensure compliance.  CBC in the office today shows a stable H/H and mild thrombocytosis.  Results discussed with patient and his wife.      Review of Systems   Constitutional:  Negative for activity change, fatigue, fever and unexpected weight change.   HENT:  Negative for hearing loss, mouth sores, sore throat and trouble swallowing.    Eyes: Negative.    Respiratory:  Negative for cough, shortness of breath and wheezing.    Cardiovascular:  Negative for chest pain, palpitations and leg swelling.   Gastrointestinal:  Negative for abdominal distention, abdominal pain, constipation, diarrhea, nausea and vomiting.   Genitourinary:  Negative for dysuria, frequency, hematuria and urgency.   Musculoskeletal:   "Positive for neck pain (Chronic). Negative for arthralgias and back pain.   Integumentary:  Negative for pallor and rash.        No pruritus   Neurological:  Negative for dizziness, weakness, numbness and headaches.   Hematological:  Negative for adenopathy. Does not bruise/bleed easily.   Psychiatric/Behavioral: Negative.         PMHx:  HTN, hyperlipidemia, COPD  PSHx:  Tonsils, appendectomy, hemorrhoids, R foot lipoma, bone marrow, colonoscopy, umbilical hernia repair 5/23, hemorrhoidal banding 3/23  SH:  + Tobacco 1.5 PPD > 40 years. + Social alcohol use. 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  FH:  Maternal grandfather had cancer of unknown type. No family history of blood disorders.     Objective:        /73   Pulse 78   Temp 98.2 °F (36.8 °C)   Resp 18   Ht 5' 5" (1.651 m)   Wt 88.2 kg (194 lb 6.4 oz)   SpO2 (!) 93%   BMI 32.35 kg/m²    Physical Exam  Constitutional:       Comments: Mildly obese white male in NAD   HENT:      Head: Normocephalic.      Mouth/Throat:      Mouth: Mucous membranes are moist.      Pharynx: Oropharynx is clear. No posterior oropharyngeal erythema.   Eyes:      Extraocular Movements: Extraocular movements intact.      Conjunctiva/sclera: Conjunctivae normal.      Pupils: Pupils are equal, round, and reactive to light.   Cardiovascular:      Rate and Rhythm: Normal rate and regular rhythm.      Heart sounds: No murmur heard.  Abdominal:      General: Bowel sounds are normal. There is no distension.      Palpations: Abdomen is soft. There is no mass.      Tenderness: There is no abdominal tenderness.      Comments: Obese, No splenomegaly   Musculoskeletal:         General: No swelling or tenderness.      Cervical back: Neck supple. No tenderness.   Skin:     General: Skin is warm and dry.      Findings: No rash.   Neurological:      General: No focal deficit present.      Mental Status: He is alert and oriented to person, place, and time.      " Cranial Nerves: No cranial nerve deficit.      Motor: No weakness.          LABORATORY  Recent Results (from the past 168 hour(s))   Ferritin    Collection Time: 08/01/24 12:34 PM   Result Value Ref Range    Ferritin Level 55.55 21.81 - 274.66 ng/mL   CBC with Differential    Collection Time: 08/01/24 12:34 PM   Result Value Ref Range    WBC 7.40 4.50 - 11.50 x10(3)/mcL    RBC 4.69 (L) 4.70 - 6.10 x10(6)/mcL    Hgb 15.2 14.0 - 18.0 g/dL    Hct 45.8 42.0 - 52.0 %    MCV 97.7 (H) 80.0 - 94.0 fL    MCH 32.4 (H) 27.0 - 31.0 pg    MCHC 33.2 33.0 - 36.0 g/dL    RDW 14.1 11.5 - 17.0 %    Platelet 507 (H) 130 - 400 x10(3)/mcL    MPV 8.7 7.4 - 10.4 fL    Neut % 75.8 %    Lymph % 11.1 %    Mono % 10.4 %    Eos % 1.5 %    Basophil % 0.8 %    Lymph # 0.82 0.6 - 4.6 x10(3)/mcL    Neut # 5.61 2.1 - 9.2 x10(3)/mcL    Mono # 0.77 0.1 - 1.3 x10(3)/mcL    Eos # 0.11 0 - 0.9 x10(3)/mcL    Baso # 0.06 <=0.2 x10(3)/mcL    IG# 0.03 0 - 0.04 x10(3)/mcL    IG% 0.4 %         Assessment:   ELLIS-2+ Polcythemia vera  Recurrent iron deficiency anemia - resolved    Plan:   Laboratory today is at goal.  Ferritin level is normal.  Continue Hydrea 1000 mg daily.    Patient and his wife do not want to consider alternative therapies unless his neurologic status shows significant change.  RTC 3-4 months with CBC and ferritin level.  All questions answered to the satisfaction of the patient.    GORDON DELGADO, FNP-C  [facility]     Other Physicians  Dr. Nick Garcia

## 2024-08-01 ENCOUNTER — LAB VISIT (OUTPATIENT)
Dept: LAB | Facility: HOSPITAL | Age: 69
End: 2024-08-01
Attending: INTERNAL MEDICINE
Payer: MEDICARE

## 2024-08-01 ENCOUNTER — OFFICE VISIT (OUTPATIENT)
Dept: HEMATOLOGY/ONCOLOGY | Facility: CLINIC | Age: 69
End: 2024-08-01
Payer: MEDICARE

## 2024-08-01 VITALS
RESPIRATION RATE: 18 BRPM | SYSTOLIC BLOOD PRESSURE: 124 MMHG | HEART RATE: 78 BPM | WEIGHT: 194.38 LBS | HEIGHT: 65 IN | OXYGEN SATURATION: 93 % | TEMPERATURE: 98 F | BODY MASS INDEX: 32.39 KG/M2 | DIASTOLIC BLOOD PRESSURE: 73 MMHG

## 2024-08-01 DIAGNOSIS — D45 POLYCYTHEMIA VERA: ICD-10-CM

## 2024-08-01 DIAGNOSIS — D45 POLYCYTHEMIA VERA: Primary | ICD-10-CM

## 2024-08-01 DIAGNOSIS — Z79.899 DRUG-INDUCED IMMUNODEFICIENCY: ICD-10-CM

## 2024-08-01 DIAGNOSIS — D84.821 DRUG-INDUCED IMMUNODEFICIENCY: ICD-10-CM

## 2024-08-01 LAB
BASOPHILS # BLD AUTO: 0.06 X10(3)/MCL
BASOPHILS NFR BLD AUTO: 0.8 %
EOSINOPHIL # BLD AUTO: 0.11 X10(3)/MCL (ref 0–0.9)
EOSINOPHIL NFR BLD AUTO: 1.5 %
ERYTHROCYTE [DISTWIDTH] IN BLOOD BY AUTOMATED COUNT: 14.1 % (ref 11.5–17)
FERRITIN SERPL-MCNC: 55.55 NG/ML (ref 21.81–274.66)
HCT VFR BLD AUTO: 45.8 % (ref 42–52)
HGB BLD-MCNC: 15.2 G/DL (ref 14–18)
IMM GRANULOCYTES # BLD AUTO: 0.03 X10(3)/MCL (ref 0–0.04)
IMM GRANULOCYTES NFR BLD AUTO: 0.4 %
LYMPHOCYTES # BLD AUTO: 0.82 X10(3)/MCL (ref 0.6–4.6)
LYMPHOCYTES NFR BLD AUTO: 11.1 %
MCH RBC QN AUTO: 32.4 PG (ref 27–31)
MCHC RBC AUTO-ENTMCNC: 33.2 G/DL (ref 33–36)
MCV RBC AUTO: 97.7 FL (ref 80–94)
MONOCYTES # BLD AUTO: 0.77 X10(3)/MCL (ref 0.1–1.3)
MONOCYTES NFR BLD AUTO: 10.4 %
NEUTROPHILS # BLD AUTO: 5.61 X10(3)/MCL (ref 2.1–9.2)
NEUTROPHILS NFR BLD AUTO: 75.8 %
PLATELET # BLD AUTO: 507 X10(3)/MCL (ref 130–400)
PMV BLD AUTO: 8.7 FL (ref 7.4–10.4)
RBC # BLD AUTO: 4.69 X10(6)/MCL (ref 4.7–6.1)
WBC # BLD AUTO: 7.4 X10(3)/MCL (ref 4.5–11.5)

## 2024-08-01 PROCEDURE — 82728 ASSAY OF FERRITIN: CPT

## 2024-08-01 PROCEDURE — 85025 COMPLETE CBC W/AUTO DIFF WBC: CPT

## 2024-08-01 PROCEDURE — 36415 COLL VENOUS BLD VENIPUNCTURE: CPT

## 2024-08-01 PROCEDURE — 99214 OFFICE O/P EST MOD 30 MIN: CPT | Mod: PBBFAC | Performed by: NURSE PRACTITIONER

## 2024-08-01 PROCEDURE — 99214 OFFICE O/P EST MOD 30 MIN: CPT | Mod: S$PBB,,, | Performed by: NURSE PRACTITIONER

## 2024-08-01 PROCEDURE — 99999 PR PBB SHADOW E&M-EST. PATIENT-LVL IV: CPT | Mod: PBBFAC,,, | Performed by: NURSE PRACTITIONER

## 2024-09-24 ENCOUNTER — TELEPHONE (OUTPATIENT)
Dept: PULMONOLOGY | Facility: CLINIC | Age: 69
End: 2024-09-24
Payer: MEDICARE

## 2024-09-24 NOTE — TELEPHONE ENCOUNTER
I reviewed the most recent CT chest performed in [place].  The radiology reports suggested the need for a PET scan.  The radiologist did not review previous scans and after reviewing these myself the abnormalities on most recent CT are unchanged from previous.  These likely represent granulomas and no additional imaging is needed at this time.  I have asked the radiologist to compare to previous and addend their report.  I called the patient's wife and informed her of this.  If Radiology feels there are new findings then we will address that.

## 2024-10-02 DIAGNOSIS — D45 POLYCYTHEMIA VERA: ICD-10-CM

## 2024-10-02 RX ORDER — HYDROXYUREA 500 MG/1
CAPSULE ORAL
Qty: 60 CAPSULE | Refills: 1 | Status: SHIPPED | OUTPATIENT
Start: 2024-10-02

## 2024-10-07 ENCOUNTER — TELEPHONE (OUTPATIENT)
Dept: PULMONOLOGY | Facility: HOSPITAL | Age: 69
End: 2024-10-07
Payer: MEDICARE

## 2024-10-07 NOTE — TELEPHONE ENCOUNTER
I reviewed the carotid ultrasound which was significant for mild atherosclerotic plaque but no hemodynamically significant stenosis in the carotid arteries.  CT chest is unchanged from April of 2023.  Lung-RADS downgraded to a to suggesting benign findings and recommendation for follow-up low-dose CT in 12 months.  I called patient but no answer and left a message at [phone].

## 2024-10-07 NOTE — TELEPHONE ENCOUNTER
Mr. Healy called back and I gave him the information regarding the carotid ultrasound and the CT chest.  He reports that Dr. Suazo has ordered a PET scan.  I am not sure Dr. Suazo has seen the addendum to the CT scan dictated by Dr. Mathias with [address] [place] [address].  I will send her that report.  Plan to see the patient again in 1 year for follow-up.

## 2024-10-10 DIAGNOSIS — D45 POLYCYTHEMIA VERA: ICD-10-CM

## 2024-10-10 RX ORDER — HYDROXYUREA 500 MG/1
CAPSULE ORAL
Qty: 180 CAPSULE | Refills: 0 | Status: SHIPPED | OUTPATIENT
Start: 2024-10-10

## 2024-11-07 ENCOUNTER — LAB VISIT (OUTPATIENT)
Dept: LAB | Facility: HOSPITAL | Age: 69
End: 2024-11-07
Attending: INTERNAL MEDICINE
Payer: MEDICARE

## 2024-11-07 ENCOUNTER — OFFICE VISIT (OUTPATIENT)
Dept: HEMATOLOGY/ONCOLOGY | Facility: CLINIC | Age: 69
End: 2024-11-07
Payer: MEDICARE

## 2024-11-07 VITALS
HEART RATE: 60 BPM | WEIGHT: 192.81 LBS | DIASTOLIC BLOOD PRESSURE: 77 MMHG | RESPIRATION RATE: 18 BRPM | SYSTOLIC BLOOD PRESSURE: 122 MMHG | BODY MASS INDEX: 32.08 KG/M2 | TEMPERATURE: 98 F | OXYGEN SATURATION: 94 %

## 2024-11-07 DIAGNOSIS — D45 POLYCYTHEMIA VERA: ICD-10-CM

## 2024-11-07 DIAGNOSIS — D84.821 DRUG-INDUCED IMMUNODEFICIENCY: ICD-10-CM

## 2024-11-07 DIAGNOSIS — E61.1 IRON DEFICIENCY: ICD-10-CM

## 2024-11-07 DIAGNOSIS — D45 POLYCYTHEMIA VERA: Primary | ICD-10-CM

## 2024-11-07 DIAGNOSIS — Z79.899 DRUG-INDUCED IMMUNODEFICIENCY: ICD-10-CM

## 2024-11-07 LAB
BASOPHILS # BLD AUTO: 0.02 X10(3)/MCL
BASOPHILS NFR BLD AUTO: 0.3 %
EOSINOPHIL # BLD AUTO: 0.11 X10(3)/MCL (ref 0–0.9)
EOSINOPHIL NFR BLD AUTO: 1.6 %
ERYTHROCYTE [DISTWIDTH] IN BLOOD BY AUTOMATED COUNT: 14.6 % (ref 11.5–17)
FERRITIN SERPL-MCNC: 75.99 NG/ML (ref 21.81–274.66)
HCT VFR BLD AUTO: 44.9 % (ref 42–52)
HGB BLD-MCNC: 15.4 G/DL (ref 14–18)
IMM GRANULOCYTES # BLD AUTO: 0.03 X10(3)/MCL (ref 0–0.04)
IMM GRANULOCYTES NFR BLD AUTO: 0.4 %
LYMPHOCYTES # BLD AUTO: 0.93 X10(3)/MCL (ref 0.6–4.6)
LYMPHOCYTES NFR BLD AUTO: 13.8 %
MCH RBC QN AUTO: 32.8 PG (ref 27–31)
MCHC RBC AUTO-ENTMCNC: 34.3 G/DL (ref 33–36)
MCV RBC AUTO: 95.7 FL (ref 80–94)
MONOCYTES # BLD AUTO: 0.57 X10(3)/MCL (ref 0.1–1.3)
MONOCYTES NFR BLD AUTO: 8.5 %
NEUTROPHILS # BLD AUTO: 5.07 X10(3)/MCL (ref 2.1–9.2)
NEUTROPHILS NFR BLD AUTO: 75.4 %
PLATELET # BLD AUTO: 436 X10(3)/MCL (ref 130–400)
PMV BLD AUTO: 8.9 FL (ref 7.4–10.4)
RBC # BLD AUTO: 4.69 X10(6)/MCL (ref 4.7–6.1)
WBC # BLD AUTO: 6.73 X10(3)/MCL (ref 4.5–11.5)

## 2024-11-07 PROCEDURE — 36415 COLL VENOUS BLD VENIPUNCTURE: CPT

## 2024-11-07 PROCEDURE — 99214 OFFICE O/P EST MOD 30 MIN: CPT | Mod: PBBFAC | Performed by: NURSE PRACTITIONER

## 2024-11-07 PROCEDURE — 99214 OFFICE O/P EST MOD 30 MIN: CPT | Mod: S$PBB,,, | Performed by: NURSE PRACTITIONER

## 2024-11-07 PROCEDURE — 85025 COMPLETE CBC W/AUTO DIFF WBC: CPT

## 2024-11-07 PROCEDURE — 99999 PR PBB SHADOW E&M-EST. PATIENT-LVL IV: CPT | Mod: PBBFAC,,, | Performed by: NURSE PRACTITIONER

## 2024-11-07 PROCEDURE — 82728 ASSAY OF FERRITIN: CPT

## 2024-11-07 RX ORDER — TRAZODONE HYDROCHLORIDE 150 MG/1
150 TABLET ORAL NIGHTLY
COMMUNITY
Start: 2024-10-31

## 2024-11-07 NOTE — PROGRESS NOTES
"Subjective:       Patient ID: Richard Healy is a 68 y.o. male.    Chief Complaint:  "I feel fine"    Diagnosis: ELLIS-2+ Polycythemia vera                    Recurrent iron deficiency anemia     Treatment History  Feraheme 3/21  Injectafer 4/22, 10/22, 3/23    Current Treatment: Hydrea 1000 mg daily; ASA 81 mg daily    Clinical History:  Patient was initially referred to Dr. Christensen in [place] in 2017 for an abnormal CBC. Platelet count was 739 and hemoglobin level was 18.6. He was started on Hydrea pending further workup. PCR for BCR-ABL was negative. Bone marrow 5/11/17 was hypercellular 70% with trilineage hematopoiesis and no significant dysplasia. Iron stores were absent. There was no significant fibrosis. 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 Following Dr. Christensen's detention, care was transferred to Dr. Taylor. He had never undergone therapeutic phlebotomy. Beginning in July and September 2020, he began developing mild anemia with microcytic, hypochromic red cell indices. He also had progressive thrombocytosis. Iron profile 10/29/20 showed evidence of iron deficiency anemia with a serum ferritin level of 8.9. He was treated with an oral iron supplement but had progressive anemia with associated fatigue and progressive thrombocytopenia. He also had symptoms of pica for Cherry tomatoes and cucumbers. Follow-up testing 2/12/21 showed a hemoglobin of 11.1 and hematocrit 38.2. WBC was 9.2 and platelet count had increased to 739. Serum iron was 20, TIBC 488, saturation 4% and ferritin level 5.6 ng/mL. B12 and folic acid levels were normal. He was treated with IV Feraheme 510 mg x 2 doses 3/21 with symptomatic improvement.    He transferred his care to [facility] 3/10/21 due to the relocation of Dr. Taylor. Counts improved following IV iron therapy.  Hydrea was resumed 4/1/21 at dose of 500 mg alternating with 1000 mg daily.  He developed recurrent iron " deficiency anemia with a hemoglobin of 11.4 and serum ferritin level 14.6 ng/mL.  His WBC and platelet counts were normal on Hydrea.  He had recurrent PICA symptoms for cherry tomatoes.  Was treated with a single dose of Injectafer 750 mg on 04/11/22.  His energy level improved following treatment.    EGD and colonoscopy 2022 was remarkable only for internal hemorrhoids.  Required re-treatment with IV Injectafer x 2 doses 10/22 for recurrent iron deficiency anemia with a hemoglobin of 11.8 and serum ferritin level of 17 ng/mL.  He had mild PICA symptoms for cherry tomatoes and cucumbers.  He was treated with IV Injectafer 3/23 with improvement in his blood counts.  His bleeding stopped after he underwent hemorrhoidal banding.  He underwent a right adrenalectomy 10/27/23 with pathology showing a cavernous hemangioma.    Family reported cognitive changes with negative neurologic workup.  A drug holiday was recommended and initiated.  Treatment was resumed within 5-7 days due to concerns for his blood counts.      Interval History  He returns to the office today for a three-month follow-up visit accompanied by his wife.  He is ambulatory without assistance.  He continues on Hydrea 1000 mg daily.  He is no longer taking Plavix.  He underwent a CT chest low dose for lung cancer screening which showed a pulmonary nodule.  He was referred for a CT PET scan which was performed 10/9/24 and showed no suspicious hypermetabolic activity.  Laboratory in the office today is at goal.  Wife still verbalized concerns over patient experiencing intermittent episodes of altered mental status, and feels strongly the Hydrea is the cause.      Review of Systems   Constitutional:  Negative for activity change, fatigue, fever and unexpected weight change.   HENT:  Negative for hearing loss, mouth sores, sore throat and trouble swallowing.    Eyes: Negative.    Respiratory:  Negative for cough, shortness of breath and wheezing.     Cardiovascular:  Negative for chest pain, palpitations and leg swelling.   Gastrointestinal:  Negative for abdominal distention, abdominal pain, constipation, diarrhea, nausea and vomiting.   Genitourinary:  Negative for dysuria, frequency, hematuria and urgency.   Musculoskeletal:  Positive for neck pain (Chronic). Negative for arthralgias and back pain.   Integumentary:  Negative for pallor and rash.        No pruritus   Neurological:  Negative for dizziness, weakness, numbness and headaches.   Hematological:  Negative for adenopathy. Does not bruise/bleed easily.   Psychiatric/Behavioral:  Positive for confusion (subjective).        PMHx:  HTN, hyperlipidemia, COPD  PSHx:  Tonsils, appendectomy, hemorrhoids, R foot lipoma, bone marrow, colonoscopy, umbilical hernia repair 5/23, hemorrhoidal banding 3/23  SH:  + Tobacco 1.5 PPD > 40 years. + Social alcohol use. 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  FH:  Maternal grandfather had cancer of unknown type. No family history of blood disorders.     Objective:        /77 (Patient Position: Sitting)   Pulse 60   Temp 97.8 °F (36.6 °C)   Resp 18   Wt 87.5 kg (192 lb 12.8 oz)   SpO2 (!) 94%   BMI 32.08 kg/m²    Physical Exam  Constitutional:       Comments: Mildly obese white male in NAD   HENT:      Head: Normocephalic.      Mouth/Throat:      Mouth: Mucous membranes are moist.      Pharynx: Oropharynx is clear. No posterior oropharyngeal erythema.   Eyes:      Extraocular Movements: Extraocular movements intact.      Conjunctiva/sclera: Conjunctivae normal.      Pupils: Pupils are equal, round, and reactive to light.   Cardiovascular:      Rate and Rhythm: Normal rate and regular rhythm.      Heart sounds: No murmur heard.  Abdominal:      General: Bowel sounds are normal. There is no distension.      Palpations: Abdomen is soft. There is no mass.      Tenderness: There is no abdominal tenderness.      Comments: Obese, No  splenomegaly   Musculoskeletal:         General: No swelling or tenderness.      Cervical back: Neck supple. No tenderness.   Skin:     General: Skin is warm and dry.      Findings: No rash.   Neurological:      General: No focal deficit present.      Mental Status: He is alert and oriented to person, place, and time.      Cranial Nerves: No cranial nerve deficit.      Motor: No weakness.          LABORATORY  Recent Results (from the past week)   Ferritin    Collection Time: 11/07/24 12:29 PM   Result Value Ref Range    Ferritin Level 75.99 21.81 - 274.66 ng/mL   CBC with Differential    Collection Time: 11/07/24 12:29 PM   Result Value Ref Range    WBC 6.73 4.50 - 11.50 x10(3)/mcL    RBC 4.69 (L) 4.70 - 6.10 x10(6)/mcL    Hgb 15.4 14.0 - 18.0 g/dL    Hct 44.9 42.0 - 52.0 %    MCV 95.7 (H) 80.0 - 94.0 fL    MCH 32.8 (H) 27.0 - 31.0 pg    MCHC 34.3 33.0 - 36.0 g/dL    RDW 14.6 11.5 - 17.0 %    Platelet 436 (H) 130 - 400 x10(3)/mcL    MPV 8.9 7.4 - 10.4 fL    Neut % 75.4 %    Lymph % 13.8 %    Mono % 8.5 %    Eos % 1.6 %    Basophil % 0.3 %    Lymph # 0.93 0.6 - 4.6 x10(3)/mcL    Neut # 5.07 2.1 - 9.2 x10(3)/mcL    Mono # 0.57 0.1 - 1.3 x10(3)/mcL    Eos # 0.11 0 - 0.9 x10(3)/mcL    Baso # 0.02 <=0.2 x10(3)/mcL    IG# 0.03 0 - 0.04 x10(3)/mcL    IG% 0.4 %           Assessment:   ELLIS-2+ Polcythemia vera  Recurrent iron deficiency anemia - resolved      Plan:   Laboratory today is at goal.    Family still concerned patient is experiencing episodes of AMS/confusion and concerned it may be due to Hydrea.  No reports in PI.  Case discussed with physician.  Because CBC is at goal and patient does not have splenomegaly, he feels Jakafi unlikely to be of benefit.  Recommends drug holiday again.  I placed a call to discuss with patient and family, but there was no answer.  Continue Hydrea 1000 mg daily in the interim.    RTC 3-4 months with CBC and ferritin level.  All questions answered to the satisfaction of the  patient.    GORDON DELGADO, KANE-C  [facility] [place]     Other Physicians  Dr. Nick Garcia      Addendum 11/8/24: Patient and wife open to another treatment break to see if neurologic/cognitive deficits improve.  Will hold treatment for one month.  Virtual/phone only follow-up to re-assess.    GORDON DELGADO, YOUSIFC

## 2024-12-10 ENCOUNTER — OFFICE VISIT (OUTPATIENT)
Dept: HEMATOLOGY/ONCOLOGY | Facility: CLINIC | Age: 69
End: 2024-12-10
Payer: MEDICARE

## 2024-12-10 DIAGNOSIS — D45 POLYCYTHEMIA VERA: Primary | ICD-10-CM

## 2024-12-10 NOTE — PROGRESS NOTES
"Established Patient - Audio Only Telehealth Visit     The patient location is: [place]  The chief complaint leading to consultation is: Medication follow-up  Visit type: Virtual visit with audio only (telephone)  Total time spent with patient: 11 minutes       The reason for the audio only service rather than synchronous audio and video virtual visit was related to technical difficulties or patient preference/necessity.     Each patient to whom I provide medical services by telemedicine is:  (1) informed of the relationship between the physician and patient and the respective role of any other health care provider with respect to management of the patient; and (2) notified that they may decline to receive medical services by telemedicine and may withdraw from such care at any time. Patient verbally consented to receive this service via voice-only telephone call.       HPI:   A virtual/telephone visit was conducted today with Mr Healy and his wife in follow-up to his office visit 11/7/24.  At that visit, patient was again given a medication holiday due to concerns over AMS (confusion, delirium, agitation) felt due to the Hydrea.  He has been holding the Hydrea as directed.  Since that time, he was also initiated on Trazodone for insomnia.  Patient appreciates no change and feels he is his normal self.  His wife feels he is less angry and agitated, and things have been "quiet" overall at home.  She is very concerned about his mental status/cognitive issues, but also about potential complications of his PV off of treatment.      CBC 11/7/24   WBC 6.73  H/H 15.4/44.9  Plt 436,000     Assessment:  ELLIS-2+ Polcythemia vera        Plan:  Case discussed with Dr. Sanders.  Given stability of CBC, he feels that continuing the Hydrea drug holiday is appropriate.    Family will monitor for erythromelalgia, fever, unexplained weight loss, pruritus.  If he develops above, will have him come in for CBC and evaluation prior to " resuming Hydrea.  Otherwise, he will keep his follow-up appointment with Dr. Sanders in March.    Length of this consultation was 11 minutes and greater than 50% of the encounter was spent counseling/coordinating care.    This is a telehealth note.  Synchronous telemedicine services rendered via real-time interactive audio telecommunications system according to [facility] protocols.  I, Bhakti Gutierres, conducted the visit from location identified below.  The patient participated in the visit at a non-[facility] location selected by the patient, identified below.  I am licensed in the state where the patient stated they are located.  The patient stated that they understood and accepted the privacy and security risks to their information at their location.      Patient was located In [place].  I, Bhakti Gutierres, was located at [facility].      GORDON Santo, FNP-C       This service was not originating from a related E/M service provided within the previous 7 days nor will  to an E/M service or procedure within the next 24 hours or my soonest available appointment.  Prevailing standard of care was able to be met in this audio-only visit.

## 2025-01-16 ENCOUNTER — TELEPHONE (OUTPATIENT)
Dept: HEMATOLOGY/ONCOLOGY | Facility: CLINIC | Age: 70
End: 2025-01-16
Payer: MEDICARE

## 2025-01-16 NOTE — TELEPHONE ENCOUNTER
Advised patient he does not need to come in for lab tomorrow since I was able to get the results from Dr. Suazo office. I advised him Dr. Sanders will review his results and we will get back in touch with plan.

## 2025-01-16 NOTE — TELEPHONE ENCOUNTER
"I returned patient call and he reports he saw Dr. Suazo on 12/29/2024 and had labs drawn that showed his platelets were "very high." He said he was told not to take the Hydrea until he sees Dr. Sanders in March but he went ahead and restarted the Hydrea that evening since his platelets were so high. Now that he is back on the medication he is dizzy and confused. He is calling to find out is there is anything else he can take because he cannot deal with the side effects. I can see the lab results in Care Everywhere and will print them for our records. He agreed to come in tomorrow for a CBC.  "

## 2025-01-17 NOTE — TELEPHONE ENCOUNTER
Advised patient per Dr. Sanders moved appt to sooner: 2/17/2025 a 100 for lab and visit to discuss other options for therapy. Patient wants to continue on the hydrea for now and will deal with the side effects. He will call in the meantime should his symptoms worsen or he has any questions.

## 2025-02-06 NOTE — PROGRESS NOTES
Subjective:       Patient ID: Richard Healy is a 69 y.o. male.    Chief Complaint:  Dizziness, incoordination and slurred speech on Hydrea    Diagnosis: ELLIS-2+ Polycythemia vera                    Recurrent iron deficiency anemia     Treatment History  Feraheme 3/21  Injectafer 4/22, 10/22, 3/23    Current Treatment:  Hydrea 1000 mg/d (restarted 12/29/24) ASA 81 mg daily    Clinical History:  Patient was initially referred to Dr. Christensen in [place] in 2017 for an abnormal CBC. Platelet count was 739 and hemoglobin level was 18.6. He was started on Hydrea pending further workup. PCR for BCR-ABL was negative. Bone marrow 5/11/17 was hypercellular 70% with trilineage hematopoiesis and no significant dysplasia. Iron stores were absent. There was no significant fibrosis. 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 Following Dr. Christensen's [facility], care was transferred to Dr. Taylor. He had never undergone therapeutic phlebotomy. Beginning in July and September 2020, he began developing mild anemia with microcytic, hypochromic red cell indices. He also had progressive thrombocytosis. Iron profile 10/29/20 showed evidence of iron deficiency anemia with a serum ferritin level of 8.9. He was treated with an oral iron supplement but had progressive anemia with associated fatigue and progressive thrombocytopenia. He also had symptoms of pica for Cherry tomatoes and cucumbers. Follow-up testing 2/12/21 showed a hemoglobin of 11.1 and hematocrit 38.2. WBC was 9.2 and platelet count had increased to 739. Serum iron was 20, TIBC 488, saturation 4% and ferritin level 5.6 ng/mL. B12 and folic acid levels were normal. He was treated with IV Feraheme 510 mg x 2 doses 3/21 with symptomatic improvement.    He transferred his care to [facility] 3/10/21 due to the relocation of Dr. Taylor. Counts improved following IV iron therapy.  Hydrea was resumed 4/1/21 at dose of 500 mg  alternating with 1000 mg daily.  He developed recurrent iron deficiency anemia with a hemoglobin of 11.4 and serum ferritin level 14.6 ng/mL.  His WBC and platelet counts were normal on Hydrea.  He had recurrent PICA symptoms for cherry tomatoes.  Was treated with a single dose of Injectafer 750 mg on 04/11/22.  His energy level improved following treatment.    EGD and colonoscopy 2022 was remarkable only for internal hemorrhoids.  Required re-treatment with IV Injectafer x 2 doses 10/22 for recurrent iron deficiency anemia with a hemoglobin of 11.8 and serum ferritin level of 17 ng/mL.  He had mild PICA symptoms for cherry tomatoes and cucumbers.  He was treated with IV Injectafer 3/23 with improvement in his blood counts.  His bleeding stopped after he underwent hemorrhoidal banding.  He underwent a right adrenalectomy 10/27/23 with pathology showing a cavernous hemangioma.    Family reported cognitive changes with negative neurologic workup.  Hydrea was held and his symptoms seemed better.  However, he resume treatment due to an increasing platelet count.    LD lung screening CT 9/19/24:  Scattered bilateral pulmonary nodules, largest 6 mm.  Platelike opacity left lung base broadly abutting the pleural surface measuring 1.9 x 0.8 cm.  CT-PET scan recommended.  CT-PET 10/9/24:  No abnormal hypermetabolic uptake suspicious for carcinoma.      Interval History  He returns to clinic today for a two-month follow-up visit accompanied by his wife.  He stopped taking Hydrea again for several weeks in November and December.  He felt better off of treatment.  Laboratory done by his PCP 1/6/25 showed an elevated platelet count of 872.  His white count was 10.1 and hematocrit 50.8.  He started back on Hydrea 1000 mg daily 12/29/24.  Shortly after restarting therapy, he complained of dizziness, incoordination and slurred speech.  His wife noticed the same changes.  He denied any falls or injuries.  He continues to smoke  "heavily.  No history of thromboembolism.  He denies any headaches, visual changes, pruritus or erythromelalgia.      Review of Systems   Constitutional:  Negative for activity change, fatigue, fever and unexpected weight change.   HENT:  Negative for hearing loss, mouth sores, sore throat and trouble swallowing.    Eyes: Negative.    Respiratory:  Positive for shortness of breath (with activity). Negative for cough and wheezing.    Cardiovascular:  Negative for chest pain, palpitations and leg swelling.   Gastrointestinal:  Negative for abdominal distention, abdominal pain, constipation, diarrhea and nausea.   Genitourinary:  Negative for dysuria, flank pain and frequency.   Musculoskeletal:  Positive for neck pain (Chronic). Negative for arthralgias and back pain.   Integumentary:  Negative for pallor and rash.        No pruritus   Neurological:  Positive for dizziness and coordination difficulties. Negative for weakness, numbness and headaches.   Hematological:  Negative for adenopathy. Does not bruise/bleed easily.   Psychiatric/Behavioral:  Positive for confusion (subjective).        PMHx:  HTN, hyperlipidemia, COPD  PSHx:  Tonsils, appendectomy, hemorrhoids, R foot lipoma, bone marrow, colonoscopy, umbilical hernia repair 5/23, hemorrhoidal banding 3/23  SH:  + Tobacco 1.5 PPD > 40 years. + Social alcohol use. 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  FH:  Maternal grandfather had cancer of unknown type. No family history of blood disorders.     Objective:        BP (!) 151/85 (Patient Position: Sitting)   Pulse 71   Temp 97.9 °F (36.6 °C)   Resp 19   Ht 5' 5" (1.651 m)   Wt 87.5 kg (192 lb 12.8 oz)   SpO2 95%   BMI 32.08 kg/m²    Physical Exam  Constitutional:       Comments: Mildly obese white male in NAD   HENT:      Head: Normocephalic.      Mouth/Throat:      Mouth: Mucous membranes are moist.      Pharynx: Oropharynx is clear. No posterior oropharyngeal erythema.   Eyes:    "   General: No scleral icterus.     Extraocular Movements: Extraocular movements intact.      Pupils: Pupils are equal, round, and reactive to light.   Cardiovascular:      Rate and Rhythm: Normal rate and regular rhythm.      Heart sounds: No murmur heard.  Pulmonary:      Comments: Decreased breath sounds at the bases  Abdominal:      General: Bowel sounds are normal. There is no distension.      Palpations: Abdomen is soft. There is no mass.      Tenderness: There is no abdominal tenderness.      Comments: Obese, No splenomegaly   Musculoskeletal:         General: No swelling or tenderness.      Cervical back: Neck supple. No tenderness.   Skin:     General: Skin is warm and dry.      Findings: No rash.   Neurological:      General: No focal deficit present.      Mental Status: He is alert and oriented to person, place, and time.      Cranial Nerves: No cranial nerve deficit.      Motor: No weakness.          LABORATORY  Recent Results (from the past week)   CBC with Differential    Collection Time: 02/17/25  1:58 PM   Result Value Ref Range    WBC 7.65 4.50 - 11.50 x10(3)/mcL    RBC 5.38 4.70 - 6.10 x10(6)/mcL    Hgb 16.6 14.0 - 18.0 g/dL    Hct 49.1 42.0 - 52.0 %    MCV 91.3 80.0 - 94.0 fL    MCH 30.9 27.0 - 31.0 pg    MCHC 33.8 33.0 - 36.0 g/dL    RDW 16.9 11.5 - 17.0 %    Platelet 501 (H) 130 - 400 x10(3)/mcL    MPV 9.0 7.4 - 10.4 fL    Neut % 76.6 %    Lymph % 13.7 %    Mono % 6.1 %    Eos % 2.7 %    Basophil % 0.4 %    Imm Grans % 0.5 %    Neut # 5.85 2.1 - 9.2 x10(3)/mcL    Lymph # 1.05 0.6 - 4.6 x10(3)/mcL    Mono # 0.47 0.1 - 1.3 x10(3)/mcL    Eos # 0.21 0 - 0.9 x10(3)/mcL    Baso # 0.03 <=0.2 x10(3)/mcL    Imm Gran # 0.04 0.00 - 0.04 x10(3)/mcL         Assessment:   ELLIS-2+ Polcythemia vera  Recurrent iron deficiency anemia - resolved      Plan:   CBC today improved on current dose of Hydrea.    He continues to complain of poor tolerance of the medication with neurologic side-effects which are not  reported with any significant frequency.    He will decrease his Hydrea dose to 500 mg daily.    RTC in 6 weeks for a follow-up visit and repeat CBC.  If we are unable to control his blood counts on low-dose or he is unable to tolerate treatment, we will consider alternative therapy with Ruxolitinib.  Treatment plan discussed and reviewed in detail with the patient and his wife.  All questions answered.      ISRA KOHLER MD    Other Physicians  Dr. Nick Garcia

## 2025-02-17 ENCOUNTER — OFFICE VISIT (OUTPATIENT)
Dept: HEMATOLOGY/ONCOLOGY | Facility: CLINIC | Age: 70
End: 2025-02-17
Payer: MEDICARE

## 2025-02-17 ENCOUNTER — LAB VISIT (OUTPATIENT)
Dept: LAB | Facility: HOSPITAL | Age: 70
End: 2025-02-17
Attending: INTERNAL MEDICINE
Payer: MEDICARE

## 2025-02-17 VITALS
SYSTOLIC BLOOD PRESSURE: 151 MMHG | TEMPERATURE: 98 F | HEART RATE: 71 BPM | BODY MASS INDEX: 32.12 KG/M2 | WEIGHT: 192.81 LBS | DIASTOLIC BLOOD PRESSURE: 85 MMHG | HEIGHT: 65 IN | OXYGEN SATURATION: 95 % | RESPIRATION RATE: 19 BRPM

## 2025-02-17 DIAGNOSIS — E61.1 IRON DEFICIENCY: ICD-10-CM

## 2025-02-17 DIAGNOSIS — D45 POLYCYTHEMIA VERA: ICD-10-CM

## 2025-02-17 DIAGNOSIS — D45 POLYCYTHEMIA VERA: Primary | ICD-10-CM

## 2025-02-17 LAB
BASOPHILS # BLD AUTO: 0.03 X10(3)/MCL
BASOPHILS NFR BLD AUTO: 0.4 %
EOSINOPHIL # BLD AUTO: 0.21 X10(3)/MCL (ref 0–0.9)
EOSINOPHIL NFR BLD AUTO: 2.7 %
ERYTHROCYTE [DISTWIDTH] IN BLOOD BY AUTOMATED COUNT: 16.9 % (ref 11.5–17)
FERRITIN SERPL-MCNC: 105.33 NG/ML (ref 21.81–274.66)
HCT VFR BLD AUTO: 49.1 % (ref 42–52)
HGB BLD-MCNC: 16.6 G/DL (ref 14–18)
IMM GRANULOCYTES # BLD AUTO: 0.04 X10(3)/MCL (ref 0–0.04)
IMM GRANULOCYTES NFR BLD AUTO: 0.5 %
LYMPHOCYTES # BLD AUTO: 1.05 X10(3)/MCL (ref 0.6–4.6)
LYMPHOCYTES NFR BLD AUTO: 13.7 %
MCH RBC QN AUTO: 30.9 PG (ref 27–31)
MCHC RBC AUTO-ENTMCNC: 33.8 G/DL (ref 33–36)
MCV RBC AUTO: 91.3 FL (ref 80–94)
MONOCYTES # BLD AUTO: 0.47 X10(3)/MCL (ref 0.1–1.3)
MONOCYTES NFR BLD AUTO: 6.1 %
NEUTROPHILS # BLD AUTO: 5.85 X10(3)/MCL (ref 2.1–9.2)
NEUTROPHILS NFR BLD AUTO: 76.6 %
PLATELET # BLD AUTO: 501 X10(3)/MCL (ref 130–400)
PMV BLD AUTO: 9 FL (ref 7.4–10.4)
RBC # BLD AUTO: 5.38 X10(6)/MCL (ref 4.7–6.1)
WBC # BLD AUTO: 7.65 X10(3)/MCL (ref 4.5–11.5)

## 2025-02-17 PROCEDURE — 82728 ASSAY OF FERRITIN: CPT

## 2025-02-17 PROCEDURE — 85025 COMPLETE CBC W/AUTO DIFF WBC: CPT

## 2025-02-17 PROCEDURE — 36415 COLL VENOUS BLD VENIPUNCTURE: CPT

## 2025-02-17 PROCEDURE — 99214 OFFICE O/P EST MOD 30 MIN: CPT | Mod: PBBFAC | Performed by: INTERNAL MEDICINE

## 2025-02-17 RX ORDER — DOXEPIN HYDROCHLORIDE 50 MG/1
50 CAPSULE ORAL NIGHTLY
COMMUNITY

## 2025-02-17 RX ORDER — ZOLPIDEM TARTRATE 10 MG/1
TABLET ORAL
COMMUNITY

## 2025-03-19 NOTE — PROGRESS NOTES
Subjective:       Patient ID: Richard Healy is a 69 y.o. male.    Chief Complaint:  Intermittent dizziness    Diagnosis: ELLIS-2+ Polycythemia vera                    Recurrent iron deficiency anemia     Treatment History  Feraheme 3/21  Injectafer 4/22, 10/22, 3/23    Current Treatment:  Hydrea restarted 12/29/24 - current dose 500 mg/d; ASA 81 mg daily    Clinical History:  Patient was initially referred to Dr. Christensen in [place] in 2017 for an abnormal CBC. Platelet count was 739 and hemoglobin level was 18.6. He was started on Hydrea pending further workup. PCR for BCR-ABL was negative. Bone marrow 5/11/17 was hypercellular 70% with trilineage hematopoiesis and no significant dysplasia. Iron stores were absent. There was no significant fibrosis. 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 Following Dr. Christensen's assisted, care was transferred to Dr. Taylor. He had never undergone therapeutic phlebotomy. Beginning in July and September 2020, he began developing mild anemia with microcytic, hypochromic red cell indices. He also had progressive thrombocytosis. Iron profile 10/29/20 showed evidence of iron deficiency anemia with a serum ferritin level of 8.9. He was treated with an oral iron supplement but had progressive anemia with associated fatigue and progressive thrombocytopenia. He also had symptoms of pica for Cherry tomatoes and cucumbers. Follow-up testing 2/12/21 showed a hemoglobin of 11.1 and hematocrit 38.2. WBC was 9.2 and platelet count had increased to 739. Serum iron was 20, TIBC 488, saturation 4% and ferritin level 5.6 ng/mL. B12 and folic acid levels were normal. He was treated with IV Feraheme 510 mg x 2 doses 3/21 with symptomatic improvement.    He transferred his care to [facility] 3/10/21 due to the relocation of Dr. Taylor. Counts improved following IV iron therapy.  Hydrea was resumed 4/1/21 at dose of 500 mg alternating with 1000 mg  daily.  He developed recurrent iron deficiency anemia with a hemoglobin of 11.4 and serum ferritin level 14.6 ng/mL.  His WBC and platelet counts were normal on Hydrea.  He had recurrent PICA symptoms for cherry tomatoes.  Was treated with a single dose of Injectafer 750 mg on 04/11/22.  His energy level improved following treatment.    EGD and colonoscopy 2022 was remarkable only for internal hemorrhoids.  Required re-treatment with IV Injectafer x 2 doses 10/22 for recurrent iron deficiency anemia with a hemoglobin of 11.8 and serum ferritin level of 17 ng/mL.  He had mild PICA symptoms for cherry tomatoes and cucumbers.  He was treated with IV Injectafer 3/23 with improvement in his blood counts.  His bleeding stopped after he underwent hemorrhoidal banding.  He underwent a right adrenalectomy 10/27/23 with pathology showing a cavernous hemangioma.    Family reported cognitive changes with negative neurologic workup.  Hydrea was held and his symptoms seemed better.  However, he resume treatment due to an increasing platelet count.    LD lung screening CT 9/19/24:  Scattered bilateral pulmonary nodules, largest 6 mm.  Platelike opacity left lung base broadly abutting the pleural surface measuring 1.9 x 0.8 cm.  CT-PET scan recommended.  CT-PET 10/9/24:  No abnormal hypermetabolic uptake suspicious for carcinoma.      Interval History  He returns to the office today for a 6 week follow-up visit accompanied by his wife.  His Hydrea dose was reduced to 500 mg daily at the time of his last office visit because he felt like the medication caused dizziness, incoordination and slurred speech.  He still has intermittent episodes of the above, but much less frequent.  His wife reports that he has had several falls without significant injury.  He is otherwise not on any new medications.  His CBC is stable on the lower dose of Hydrea.  He does not have any significant splenomegaly.  He is trying to quit smoking.  He denies  "any headaches, visual changes, pruritus or erythromelalgia.      Review of Systems   Constitutional:  Negative for activity change, fatigue, fever and unexpected weight change.   HENT:  Negative for hearing loss, mouth sores, sore throat and trouble swallowing.    Eyes: Negative.    Respiratory:  Positive for shortness of breath (with activity). Negative for cough and wheezing.    Cardiovascular:  Negative for chest pain, palpitations and leg swelling.   Gastrointestinal:  Negative for abdominal distention, abdominal pain, constipation, diarrhea and nausea.   Genitourinary:  Negative for dysuria, flank pain and frequency.   Musculoskeletal:  Positive for neck pain (Chronic). Negative for arthralgias and back pain.   Integumentary:  Negative for pallor and rash.        No pruritus   Neurological:  Positive for dizziness and coordination difficulties. Negative for weakness, numbness and headaches.   Hematological:  Negative for adenopathy. Does not bruise/bleed easily.   Psychiatric/Behavioral: Negative.         PMHx:  HTN, hyperlipidemia, COPD  PSHx:  Tonsils, appendectomy, hemorrhoids, R foot lipoma, bone marrow, colonoscopy, umbilical hernia repair 5/23, hemorrhoidal banding 3/23  SH:  + Tobacco 1.5 PPD > 40 years. + Social alcohol use. 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  FH:  Maternal grandfather had cancer of unknown type. No family history of blood disorders.     Objective:        /85 (Patient Position: Sitting)   Pulse 62   Temp 97.8 °F (36.6 °C)   Resp 15   Ht 5' 5" (1.651 m)   Wt 87.3 kg (192 lb 6.4 oz)   SpO2 (!) 93%   BMI 32.02 kg/m²    Physical Exam  Constitutional:       Comments: Mildly obese white male in NAD   HENT:      Head: Normocephalic.      Mouth/Throat:      Mouth: Mucous membranes are moist.      Pharynx: Oropharynx is clear. No posterior oropharyngeal erythema.   Eyes:      General: No scleral icterus.     Extraocular Movements: Extraocular movements " intact.      Pupils: Pupils are equal, round, and reactive to light.   Cardiovascular:      Rate and Rhythm: Normal rate and regular rhythm.      Heart sounds: No murmur heard.  Pulmonary:      Comments: Decreased breath sounds at the bases  Abdominal:      General: Bowel sounds are normal. There is no distension.      Palpations: Abdomen is soft. There is no mass.      Tenderness: There is no abdominal tenderness.      Comments: Obese, No splenomegaly   Musculoskeletal:         General: No swelling or tenderness.      Cervical back: Neck supple. No tenderness.   Skin:     General: Skin is warm and dry.      Findings: No rash.   Neurological:      General: No focal deficit present.      Mental Status: He is alert and oriented to person, place, and time.      Cranial Nerves: No cranial nerve deficit.      Motor: No weakness.          LABORATORY  Recent Results (from the past week)   CBC with Differential    Collection Time: 04/01/25 10:21 AM   Result Value Ref Range    WBC 8.24 4.50 - 11.50 x10(3)/mcL    RBC 5.17 4.70 - 6.10 x10(6)/mcL    Hgb 16.0 14.0 - 18.0 g/dL    Hct 47.8 42.0 - 52.0 %    MCV 92.5 80.0 - 94.0 fL    MCH 30.9 27.0 - 31.0 pg    MCHC 33.5 33.0 - 36.0 g/dL    RDW 17.0 11.5 - 17.0 %    Platelet 599 (H) 130 - 400 x10(3)/mcL    MPV 8.8 7.4 - 10.4 fL    Neut % 71.6 %    Lymph % 15.0 %    Mono % 9.2 %    Eos % 2.9 %    Basophil % 0.6 %    Imm Grans % 0.7 %    Neut # 5.89 2.1 - 9.2 x10(3)/mcL    Lymph # 1.24 0.6 - 4.6 x10(3)/mcL    Mono # 0.76 0.1 - 1.3 x10(3)/mcL    Eos # 0.24 0 - 0.9 x10(3)/mcL    Baso # 0.05 <=0.2 x10(3)/mcL    Imm Gran # 0.06 (H) 0.00 - 0.04 x10(3)/mcL       Assessment:   ELLIS-2+ Polcythemia vera      Plan:   CBC stable on Hydrea 500 mg daily.  Recommend continuing treatment at the current dose.  Unclear if his symptoms of dizziness and incoordination are related to his treatment.  Previous neurologic workup was negative.  RTC in 3 months for a follow-up visit with a repeat  CBC.      ISRA KOHLER MD    Other Physicians  Dr. Nick Garcia

## 2025-04-01 ENCOUNTER — LAB VISIT (OUTPATIENT)
Dept: LAB | Facility: HOSPITAL | Age: 70
End: 2025-04-01
Attending: INTERNAL MEDICINE
Payer: MEDICARE

## 2025-04-01 ENCOUNTER — OFFICE VISIT (OUTPATIENT)
Dept: HEMATOLOGY/ONCOLOGY | Facility: CLINIC | Age: 70
End: 2025-04-01
Payer: MEDICARE

## 2025-04-01 VITALS
TEMPERATURE: 98 F | WEIGHT: 192.38 LBS | BODY MASS INDEX: 32.05 KG/M2 | SYSTOLIC BLOOD PRESSURE: 136 MMHG | DIASTOLIC BLOOD PRESSURE: 85 MMHG | OXYGEN SATURATION: 93 % | HEIGHT: 65 IN | RESPIRATION RATE: 15 BRPM | HEART RATE: 62 BPM

## 2025-04-01 DIAGNOSIS — E61.1 IRON DEFICIENCY: ICD-10-CM

## 2025-04-01 DIAGNOSIS — D45 POLYCYTHEMIA VERA: ICD-10-CM

## 2025-04-01 DIAGNOSIS — D45 POLYCYTHEMIA VERA: Primary | ICD-10-CM

## 2025-04-01 LAB
BASOPHILS # BLD AUTO: 0.05 X10(3)/MCL
BASOPHILS NFR BLD AUTO: 0.6 %
EOSINOPHIL # BLD AUTO: 0.24 X10(3)/MCL (ref 0–0.9)
EOSINOPHIL NFR BLD AUTO: 2.9 %
ERYTHROCYTE [DISTWIDTH] IN BLOOD BY AUTOMATED COUNT: 17 % (ref 11.5–17)
HCT VFR BLD AUTO: 47.8 % (ref 42–52)
HGB BLD-MCNC: 16 G/DL (ref 14–18)
IMM GRANULOCYTES # BLD AUTO: 0.06 X10(3)/MCL (ref 0–0.04)
IMM GRANULOCYTES NFR BLD AUTO: 0.7 %
LYMPHOCYTES # BLD AUTO: 1.24 X10(3)/MCL (ref 0.6–4.6)
LYMPHOCYTES NFR BLD AUTO: 15 %
MCH RBC QN AUTO: 30.9 PG (ref 27–31)
MCHC RBC AUTO-ENTMCNC: 33.5 G/DL (ref 33–36)
MCV RBC AUTO: 92.5 FL (ref 80–94)
MONOCYTES # BLD AUTO: 0.76 X10(3)/MCL (ref 0.1–1.3)
MONOCYTES NFR BLD AUTO: 9.2 %
NEUTROPHILS # BLD AUTO: 5.89 X10(3)/MCL (ref 2.1–9.2)
NEUTROPHILS NFR BLD AUTO: 71.6 %
PLATELET # BLD AUTO: 599 X10(3)/MCL (ref 130–400)
PMV BLD AUTO: 8.8 FL (ref 7.4–10.4)
RBC # BLD AUTO: 5.17 X10(6)/MCL (ref 4.7–6.1)
WBC # BLD AUTO: 8.24 X10(3)/MCL (ref 4.5–11.5)

## 2025-04-01 PROCEDURE — 99999 PR PBB SHADOW E&M-EST. PATIENT-LVL IV: CPT | Mod: PBBFAC,,, | Performed by: INTERNAL MEDICINE

## 2025-04-01 PROCEDURE — 36415 COLL VENOUS BLD VENIPUNCTURE: CPT

## 2025-04-01 PROCEDURE — 99214 OFFICE O/P EST MOD 30 MIN: CPT | Mod: PBBFAC | Performed by: INTERNAL MEDICINE

## 2025-04-01 PROCEDURE — 99213 OFFICE O/P EST LOW 20 MIN: CPT | Mod: S$PBB,,, | Performed by: INTERNAL MEDICINE

## 2025-04-01 PROCEDURE — 85025 COMPLETE CBC W/AUTO DIFF WBC: CPT

## 2025-04-01 RX ORDER — HYDROXYUREA 500 MG/1
CAPSULE ORAL
Qty: 60 CAPSULE | Refills: 2 | Status: SHIPPED | OUTPATIENT
Start: 2025-04-01

## 2025-07-08 ENCOUNTER — LAB VISIT (OUTPATIENT)
Dept: LAB | Facility: HOSPITAL | Age: 70
End: 2025-07-08
Attending: INTERNAL MEDICINE
Payer: MEDICARE

## 2025-07-08 ENCOUNTER — OFFICE VISIT (OUTPATIENT)
Dept: HEMATOLOGY/ONCOLOGY | Facility: CLINIC | Age: 70
End: 2025-07-08
Payer: MEDICARE

## 2025-07-08 VITALS
TEMPERATURE: 98 F | HEART RATE: 86 BPM | DIASTOLIC BLOOD PRESSURE: 91 MMHG | WEIGHT: 189.88 LBS | RESPIRATION RATE: 15 BRPM | OXYGEN SATURATION: 96 % | HEIGHT: 65 IN | BODY MASS INDEX: 31.64 KG/M2 | SYSTOLIC BLOOD PRESSURE: 156 MMHG

## 2025-07-08 DIAGNOSIS — D45 POLYCYTHEMIA VERA: ICD-10-CM

## 2025-07-08 DIAGNOSIS — D84.821 DRUG-INDUCED IMMUNODEFICIENCY: ICD-10-CM

## 2025-07-08 DIAGNOSIS — Z79.899 DRUG-INDUCED IMMUNODEFICIENCY: ICD-10-CM

## 2025-07-08 DIAGNOSIS — D45 POLYCYTHEMIA VERA: Primary | ICD-10-CM

## 2025-07-08 DIAGNOSIS — E61.1 IRON DEFICIENCY: ICD-10-CM

## 2025-07-08 LAB
BASOPHILS # BLD AUTO: 0.09 X10(3)/MCL
BASOPHILS NFR BLD AUTO: 1.2 %
EOSINOPHIL # BLD AUTO: 0.25 X10(3)/MCL (ref 0–0.9)
EOSINOPHIL NFR BLD AUTO: 3.3 %
ERYTHROCYTE [DISTWIDTH] IN BLOOD BY AUTOMATED COUNT: 14.3 % (ref 11.5–17)
FERRITIN SERPL-MCNC: 170.25 NG/ML (ref 21.81–274.66)
HCT VFR BLD AUTO: 46.6 % (ref 42–52)
HGB BLD-MCNC: 15.4 G/DL (ref 14–18)
IMM GRANULOCYTES # BLD AUTO: 0.07 X10(3)/MCL (ref 0–0.04)
IMM GRANULOCYTES NFR BLD AUTO: 0.9 %
LYMPHOCYTES # BLD AUTO: 1.1 X10(3)/MCL (ref 0.6–4.6)
LYMPHOCYTES NFR BLD AUTO: 14.3 %
MCH RBC QN AUTO: 30.6 PG (ref 27–31)
MCHC RBC AUTO-ENTMCNC: 33 G/DL (ref 33–36)
MCV RBC AUTO: 92.6 FL (ref 80–94)
MONOCYTES # BLD AUTO: 0.61 X10(3)/MCL (ref 0.1–1.3)
MONOCYTES NFR BLD AUTO: 7.9 %
NEUTROPHILS # BLD AUTO: 5.56 X10(3)/MCL (ref 2.1–9.2)
NEUTROPHILS NFR BLD AUTO: 72.4 %
PLATELET # BLD AUTO: 600 X10(3)/MCL (ref 130–400)
PMV BLD AUTO: 9.2 FL (ref 7.4–10.4)
RBC # BLD AUTO: 5.03 X10(6)/MCL (ref 4.7–6.1)
WBC # BLD AUTO: 7.68 X10(3)/MCL (ref 4.5–11.5)

## 2025-07-08 PROCEDURE — 82728 ASSAY OF FERRITIN: CPT

## 2025-07-08 PROCEDURE — G2211 COMPLEX E/M VISIT ADD ON: HCPCS | Mod: ,,, | Performed by: NURSE PRACTITIONER

## 2025-07-08 PROCEDURE — 99214 OFFICE O/P EST MOD 30 MIN: CPT | Mod: S$PBB,,, | Performed by: NURSE PRACTITIONER

## 2025-07-08 PROCEDURE — 99999 PR PBB SHADOW E&M-EST. PATIENT-LVL IV: CPT | Mod: PBBFAC,,, | Performed by: NURSE PRACTITIONER

## 2025-07-08 PROCEDURE — 99214 OFFICE O/P EST MOD 30 MIN: CPT | Mod: PBBFAC | Performed by: NURSE PRACTITIONER

## 2025-07-08 PROCEDURE — 85025 COMPLETE CBC W/AUTO DIFF WBC: CPT

## 2025-07-08 PROCEDURE — 36415 COLL VENOUS BLD VENIPUNCTURE: CPT

## 2025-07-08 NOTE — PROGRESS NOTES
"Subjective:       Patient ID: Richard Healy is a 69 y.o. male.    Chief Complaint: "Here to recheck my blood work"    Diagnosis: ELLIS-2+ Polycythemia vera                    Recurrent iron deficiency anemia     Treatment History  Feraheme 3/21  Injectafer 4/22, 10/22, 3/23    Current Treatment:  Hydrea restarted 12/29/24 - current dose 500 mg/d; ASA 81 mg daily    Clinical History:  Patient was initially referred to Dr. Christensen in [place] in 2017 for an abnormal CBC. Platelet count was 739 and hemoglobin level was 18.6. He was started on Hydrea pending further workup. PCR for BCR-ABL was negative. Bone marrow 5/11/17 was hypercellular 70% with trilineage hematopoiesis and no significant dysplasia. Iron stores were absent. There was no significant fibrosis. 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 Following Dr. Christensen's prison, care was transferred to Dr. Taylor. He had never undergone therapeutic phlebotomy. Beginning in July and September 2020, he began developing mild anemia with microcytic, hypochromic red cell indices. He also had progressive thrombocytosis. Iron profile 10/29/20 showed evidence of iron deficiency anemia with a serum ferritin level of 8.9. He was treated with an oral iron supplement but had progressive anemia with associated fatigue and progressive thrombocytopenia. He also had symptoms of pica for Cherry tomatoes and cucumbers. Follow-up testing 2/12/21 showed a hemoglobin of 11.1 and hematocrit 38.2. WBC was 9.2 and platelet count had increased to 739. Serum iron was 20, TIBC 488, saturation 4% and ferritin level 5.6 ng/mL. B12 and folic acid levels were normal. He was treated with IV Feraheme 510 mg x 2 doses 3/21 with symptomatic improvement.    He transferred his care [facility] 3[facility]10[facility]21 due to the relocation of Dr. Taylor. Counts improved following IV iron therapy.  Hydrea was resumed 4/1/21 at dose of 500 mg alternating with 1000 " mg daily.  He developed recurrent iron deficiency anemia with a hemoglobin of 11.4 and serum ferritin level 14.6 ng/mL.  His WBC and platelet counts were normal on Hydrea.  He had recurrent PICA symptoms for cherry tomatoes.  Was treated with a single dose of Injectafer 750 mg on 04/11/22.  His energy level improved following treatment.    EGD and colonoscopy 2022 was remarkable only for internal hemorrhoids.  Required re-treatment with IV Injectafer x 2 doses 10/22 for recurrent iron deficiency anemia with a hemoglobin of 11.8 and serum ferritin level of 17 ng/mL.  He had mild PICA symptoms for cherry tomatoes and cucumbers.  He was treated with IV Injectafer 3/23 with improvement in his blood counts.  His bleeding stopped after he underwent hemorrhoidal banding.  He underwent a right adrenalectomy 10/27/23 with pathology showing a cavernous hemangioma.    Family reported cognitive changes with negative neurologic workup.  Hydrea was held and his symptoms seemed better.  However, he resume treatment due to an increasing platelet count.    LD lung screening CT 9/19/24:  Scattered bilateral pulmonary nodules, largest 6 mm.  Platelike opacity left lung base broadly abutting the pleural surface measuring 1.9 x 0.8 cm.  CT-PET scan recommended.  CT-PET 10/9/24:  No abnormal hypermetabolic uptake suspicious for carcinoma.      Interval History  Mr. Healy is here today for a three month hematology follow-up visit accompanied by his wife.  Hydrea was decreased to 500 mg daily 2/2025 due to concerns that it was causing neurologic side effects.  His platelet count remains mildly elevated but stable.  He has mild facial erythema, but no other associated symptoms.  He has not had recurrent dizziness, but still has slurred speech, incoordination and unsteady gait.  He was referred for Brain MRI by his family physician, which was performed 4/30/25 and showed T2 hyper intensities within the cerebral white matter.  He was  "referred to Neurology in [place] and is awaiting that appointment.      Review of Systems   Constitutional:  Negative for activity change, fatigue, fever and unexpected weight change.   HENT:  Negative for hearing loss, mouth sores, sore throat and trouble swallowing.    Eyes: Negative.    Respiratory:  Positive for shortness of breath (with activity). Negative for cough and wheezing.    Cardiovascular:  Negative for chest pain, palpitations and leg swelling.   Gastrointestinal:  Negative for abdominal distention, abdominal pain, constipation, diarrhea and nausea.   Genitourinary:  Negative for dysuria, flank pain and frequency.   Musculoskeletal:  Positive for neck pain (Chronic). Negative for arthralgias and back pain.   Integumentary:  Negative for pallor and rash.        No pruritus   Neurological:  Positive for speech difficulty and coordination difficulties. Negative for dizziness, weakness, numbness and headaches.   Hematological:  Negative for adenopathy. Does not bruise/bleed easily.       PMHx:  HTN, hyperlipidemia, COPD  PSHx:  Tonsils, appendectomy, hemorrhoids, R foot lipoma, bone marrow, colonoscopy, umbilical hernia repair 5/23, hemorrhoidal banding 3/23  SH:  + Tobacco 1.5 PPD > 40 years. + Social alcohol use. 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  FH:  Maternal grandfather had cancer of unknown type. No family history of blood disorders.     Objective:        BP (!) 156/91   Pulse 86   Temp 98 °F (36.7 °C)   Resp 15   Ht 5' 5" (1.651 m)   Wt 86.1 kg (189 lb 14.4 oz)   SpO2 96%   BMI 31.60 kg/m²    Physical Exam  Constitutional:       Comments: Mildly obese white male in NAD   HENT:      Head: Normocephalic.      Mouth/Throat:      Mouth: Mucous membranes are moist.      Pharynx: Oropharynx is clear. No posterior oropharyngeal erythema.   Eyes:      General: No scleral icterus.     Extraocular Movements: Extraocular movements intact.      Conjunctiva/sclera: " Conjunctivae normal.   Cardiovascular:      Rate and Rhythm: Normal rate and regular rhythm.      Heart sounds: No murmur heard.  Pulmonary:      Comments: Decreased breath sounds at the bases  Abdominal:      General: Bowel sounds are normal. There is no distension.      Palpations: Abdomen is soft. There is no mass.      Tenderness: There is no abdominal tenderness.      Comments: Obese, No splenomegaly   Musculoskeletal:         General: No swelling or tenderness.      Cervical back: Neck supple. No tenderness.   Skin:     General: Skin is warm and dry.      Findings: Erythema (face) present. No rash.   Neurological:      Mental Status: He is alert and oriented to person, place, and time.      Cranial Nerves: No cranial nerve deficit.      Motor: No weakness.      Gait: Gait abnormal.      Comments: Slurred speech          LABORATORY  Recent Results (from the past week)   CBC with Differential    Collection Time: 07/08/25 12:34 PM   Result Value Ref Range    WBC 7.68 4.50 - 11.50 x10(3)/mcL    RBC 5.03 4.70 - 6.10 x10(6)/mcL    Hgb 15.4 14.0 - 18.0 g/dL    Hct 46.6 42.0 - 52.0 %    MCV 92.6 80.0 - 94.0 fL    MCH 30.6 27.0 - 31.0 pg    MCHC 33.0 33.0 - 36.0 g/dL    RDW 14.3 11.5 - 17.0 %    Platelet 600 (H) 130 - 400 x10(3)/mcL    MPV 9.2 7.4 - 10.4 fL    Neut % 72.4 %    Lymph % 14.3 %    Mono % 7.9 %    Eos % 3.3 %    Basophil % 1.2 %    Imm Grans % 0.9 %    Neut # 5.56 2.1 - 9.2 x10(3)/mcL    Lymph # 1.10 0.6 - 4.6 x10(3)/mcL    Mono # 0.61 0.1 - 1.3 x10(3)/mcL    Eos # 0.25 0 - 0.9 x10(3)/mcL    Baso # 0.09 <=0.2 x10(3)/mcL    Imm Gran # 0.07 (H) 0.00 - 0.04 x10(3)/mcL       Assessment:   ELLIS-2+ Polcythemia vera      Plan:   Patient has stable thrombocytosis on CBC today.  Continue Hydrea 500 mg daily.  Neurology evaluation is pending.  Patient and wife are asking about a Neurology opinion through [facility].  Patient was seen by Dr. Noe in the past.  Will speak to his office directly.  Otherwise, RTC 3 months  for follow-up with CBC only.    All questions answered to the satisfaction of the patient.    Visit today included increased complexity associated with the care of the episodic problem, P. VERA, addressed and managing the longitudinal care of the patient due to the serious and/or complex managed problem(s).     GORDON DELGADO, FNP-C  [facility]     Other Physicians  Dr. Nick Garcia

## 2025-07-17 ENCOUNTER — OFFICE VISIT (OUTPATIENT)
Dept: NEUROLOGY | Facility: CLINIC | Age: 70
End: 2025-07-17
Payer: MEDICARE

## 2025-07-17 ENCOUNTER — LAB VISIT (OUTPATIENT)
Dept: LAB | Facility: HOSPITAL | Age: 70
End: 2025-07-17
Attending: NURSE PRACTITIONER
Payer: MEDICARE

## 2025-07-17 VITALS
WEIGHT: 190 LBS | HEIGHT: 65 IN | DIASTOLIC BLOOD PRESSURE: 78 MMHG | BODY MASS INDEX: 31.65 KG/M2 | SYSTOLIC BLOOD PRESSURE: 116 MMHG

## 2025-07-17 DIAGNOSIS — F02.A3: ICD-10-CM

## 2025-07-17 DIAGNOSIS — R46.89 BEHAVIORAL CHANGE: ICD-10-CM

## 2025-07-17 DIAGNOSIS — G47.09 OTHER INSOMNIA: ICD-10-CM

## 2025-07-17 DIAGNOSIS — R41.3 MEMORY LOSS: Primary | ICD-10-CM

## 2025-07-17 DIAGNOSIS — R40.4 EPISODIC ALTERED AWARENESS: ICD-10-CM

## 2025-07-17 DIAGNOSIS — R41.3 MEMORY LOSS: ICD-10-CM

## 2025-07-17 LAB
TSH SERPL-ACNC: 2.25 UIU/ML (ref 0.35–4.94)
VIT B12 SERPL-MCNC: 993 PG/ML (ref 213–816)

## 2025-07-17 PROCEDURE — 84393 TAU PHOSPHORYLATED EA: CPT

## 2025-07-17 PROCEDURE — 82607 VITAMIN B-12: CPT

## 2025-07-17 PROCEDURE — 84443 ASSAY THYROID STIM HORMONE: CPT

## 2025-07-17 PROCEDURE — 99999 PR PBB SHADOW E&M-EST. PATIENT-LVL III: CPT | Mod: PBBFAC,,, | Performed by: NURSE PRACTITIONER

## 2025-07-17 PROCEDURE — 99213 OFFICE O/P EST LOW 20 MIN: CPT | Mod: PBBFAC | Performed by: NURSE PRACTITIONER

## 2025-07-17 PROCEDURE — 36415 COLL VENOUS BLD VENIPUNCTURE: CPT

## 2025-07-17 RX ORDER — DOXEPIN HYDROCHLORIDE 25 MG/1
25 CAPSULE ORAL NIGHTLY
Qty: 14 CAPSULE | Refills: 0 | Status: SHIPPED | OUTPATIENT
Start: 2025-07-17

## 2025-07-17 RX ORDER — QUETIAPINE FUMARATE 50 MG/1
TABLET, FILM COATED ORAL
Qty: 30 TABLET | Refills: 5 | Status: SHIPPED | OUTPATIENT
Start: 2025-07-17
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
## 2025-07-18 LAB
IMMUNOLOGIST REVIEW: NORMAL
M PHOSPHO-TAU 217: 0.09 PG/ML

## 2025-07-21 ENCOUNTER — TELEPHONE (OUTPATIENT)
Dept: NEUROLOGY | Facility: CLINIC | Age: 70
End: 2025-07-21
Payer: MEDICARE

## 2025-07-21 NOTE — TELEPHONE ENCOUNTER
Attempted to call wife, Althea.  No answer so voicemail left    TSH normal  Vitamin B12 a little on the high side but not worrisome since it is excreted through urine    P tau 217 negative  Therefore cognitive decline unlikely due to Alzheimer's    Instructed her to call with any additional questions

## 2025-07-22 ENCOUNTER — TELEPHONE (OUTPATIENT)
Dept: NEUROLOGY | Facility: CLINIC | Age: 70
End: 2025-07-22
Payer: MEDICARE

## 2025-08-11 ENCOUNTER — OFFICE VISIT (OUTPATIENT)
Dept: NEUROLOGY | Facility: CLINIC | Age: 70
End: 2025-08-11
Payer: MEDICARE

## 2025-08-11 VITALS
SYSTOLIC BLOOD PRESSURE: 120 MMHG | WEIGHT: 190 LBS | HEIGHT: 65 IN | DIASTOLIC BLOOD PRESSURE: 82 MMHG | BODY MASS INDEX: 31.65 KG/M2

## 2025-08-11 DIAGNOSIS — G23.1 PSP (PROGRESSIVE SUPRANUCLEAR PALSY): Primary | ICD-10-CM

## 2025-08-11 DIAGNOSIS — G47.09 OTHER INSOMNIA: ICD-10-CM

## 2025-08-11 DIAGNOSIS — R29.6 FALLS: ICD-10-CM

## 2025-08-11 DIAGNOSIS — R13.19 OTHER DYSPHAGIA: ICD-10-CM

## 2025-08-11 PROCEDURE — 99213 OFFICE O/P EST LOW 20 MIN: CPT | Mod: PBBFAC | Performed by: NURSE PRACTITIONER

## 2025-08-11 PROCEDURE — 99215 OFFICE O/P EST HI 40 MIN: CPT | Mod: S$PBB,,, | Performed by: NURSE PRACTITIONER

## 2025-08-11 PROCEDURE — 99999 PR PBB SHADOW E&M-EST. PATIENT-LVL III: CPT | Mod: PBBFAC,,, | Performed by: NURSE PRACTITIONER

## (undated) DEVICE — GUIDEWIRE STF .035X260CM ANG

## (undated) DEVICE — SOL CLEARIFY VISUALIZATION LAP

## (undated) DEVICE — KIT AIRSEAL CANN CAP STD 8MM

## (undated) DEVICE — PATCH SEALANT EVARREST 2X4

## (undated) DEVICE — TROCAR KII FIOS ZTHREAD 11X100

## (undated) DEVICE — GOWN X-LG STERILE BACK

## (undated) DEVICE — SUT VICRYL PLUS 3-0 SH 18IN

## (undated) DEVICE — SHEATH BRITE TIP 7FR 23CM

## (undated) DEVICE — SET TRI-LUMEN FILTERED TUBE

## (undated) DEVICE — DRAPE COLUMN DAVINCI XI

## (undated) DEVICE — SYR ONLY LUER LOCK 20CC

## (undated) DEVICE — WIRE GUIDE .035D 260CM

## (undated) DEVICE — SHEATH INTRODUCER 5FR 10CM

## (undated) DEVICE — OBTURATOR BLADELESS 8MM XI CLR

## (undated) DEVICE — SET ANGIO ACIST CVI ANGIOTOUCH

## (undated) DEVICE — CHLORAPREP W TINT 26ML APPL

## (undated) DEVICE — DEVICE CLSR V-LOC 0 GS-21 6IN

## (undated) DEVICE — CLIP HEMO-LOK MLX LARGE LF

## (undated) DEVICE — GLOVE PROTEXIS BLUE LATEX 7

## (undated) DEVICE — HOLDER STRIP-T SELF ADH 2X10IN

## (undated) DEVICE — GUIDEWIRE TORQUE 3CM/260CML

## (undated) DEVICE — CANNULA NASAL ADULT

## (undated) DEVICE — COVER PROBE US 5.5X58L NON LTX

## (undated) DEVICE — SEALER VESSEL EXTEND

## (undated) DEVICE — ADHESIVE DERMABOND ADVANCED

## (undated) DEVICE — CATH ANGIO SOFT VU 5FR 65CM

## (undated) DEVICE — BANDAGE CURITY SHEER ADH 1X3IN

## (undated) DEVICE — SUT MCRYL PLUS 4-0 PS2 27IN

## (undated) DEVICE — Device

## (undated) DEVICE — GLOVE PROTEXIS HYDROGEL SZ7

## (undated) DEVICE — DEVICE BASIXCOMPAK INFL 20ML

## (undated) DEVICE — SUT PDS PLUS MONO 1 CT 36IN

## (undated) DEVICE — KIT AIRSEAL BIFURCT FLTR TB

## (undated) DEVICE — NDL 20GX1-1/2IN IB

## (undated) DEVICE — DRAPE ARM DAVINCI XI

## (undated) DEVICE — BAG SPEC RETRV ENDO 4X6IN DISP

## (undated) DEVICE — GLOVE PROTEXIS HYDROGEL SZ6.5

## (undated) DEVICE — CATH ULTRAVERSE 035 7X40X75

## (undated) DEVICE — SUT VLOC 90 3-0 V-20 NDL 6

## (undated) DEVICE — SEAL UNIVERSAL 5MM-8MM XI

## (undated) DEVICE — COVER TIP CURVED SCISSORS XI

## (undated) DEVICE — APPLIER LIGACLIP SM 9.38IN

## (undated) DEVICE — SUT VLOC 2-0 6IN 1/2 CIRCLE TP

## (undated) DEVICE — CATH PV .018

## (undated) DEVICE — COVER MAYO STAND REINFRCD 30

## (undated) DEVICE — KIT SURGICAL TURNOVER

## (undated) DEVICE — SYR 10CC LUER LOCK

## (undated) DEVICE — PAD DEFIB CADENCE ADULT R2

## (undated) DEVICE — ELECTRODE PATIENT RETURN DISP

## (undated) DEVICE — GUIDEWIRE INQWIRE SE 3MM JTIP

## (undated) DEVICE — SUT VICRYL+ 27 UR-6 VIOL

## (undated) DEVICE — KIT GEN LAPAROSCOPY LAFAYETTE

## (undated) DEVICE — IRRIGATOR SUCTION W/TIP

## (undated) DEVICE — GUIDEWIRE GLADIUS STR 300CM

## (undated) DEVICE — CONTAINER SPECIMEN SCREW 4OZ

## (undated) DEVICE — TOWEL OR BLUE STRL 16X26 4/PK

## (undated) DEVICE — SOL IRRI STRL WATER 1000ML

## (undated) DEVICE — TRAY CATH FOL SIL URIMTR 16FR

## (undated) DEVICE — NDL HYPO REG 25G X 1 1/2

## (undated) DEVICE — SOL NACL IRR 1000ML BTL

## (undated) DEVICE — TAPE SILK 3IN

## (undated) DEVICE — PORT ACCESS 8MM W/120MM LOW